# Patient Record
Sex: FEMALE | Race: WHITE | NOT HISPANIC OR LATINO
[De-identification: names, ages, dates, MRNs, and addresses within clinical notes are randomized per-mention and may not be internally consistent; named-entity substitution may affect disease eponyms.]

---

## 2017-03-17 ENCOUNTER — APPOINTMENT (OUTPATIENT)
Dept: INTERNAL MEDICINE | Facility: CLINIC | Age: 82
End: 2017-03-17

## 2017-03-17 VITALS
WEIGHT: 147 LBS | HEART RATE: 74 BPM | HEIGHT: 63 IN | BODY MASS INDEX: 26.05 KG/M2 | RESPIRATION RATE: 14 BRPM | SYSTOLIC BLOOD PRESSURE: 126 MMHG | DIASTOLIC BLOOD PRESSURE: 80 MMHG

## 2017-05-02 ENCOUNTER — RX RENEWAL (OUTPATIENT)
Age: 82
End: 2017-05-02

## 2017-06-06 LAB
BASOPHILS # BLD AUTO: 0.04 K/UL
BASOPHILS NFR BLD AUTO: 0.5 %
EOSINOPHIL # BLD AUTO: 0.1 K/UL
EOSINOPHIL NFR BLD AUTO: 1.4 %
HCT VFR BLD CALC: 44.7 %
HGB BLD-MCNC: 14.4 G/DL
IMM GRANULOCYTES NFR BLD AUTO: 0.3 %
LYMPHOCYTES # BLD AUTO: 3.22 K/UL
LYMPHOCYTES NFR BLD AUTO: 43.8 %
MAN DIFF?: NORMAL
MCHC RBC-ENTMCNC: 26.3 PG
MCHC RBC-ENTMCNC: 32.2 GM/DL
MCV RBC AUTO: 81.7 FL
MONOCYTES # BLD AUTO: 0.42 K/UL
MONOCYTES NFR BLD AUTO: 5.7 %
NEUTROPHILS # BLD AUTO: 3.55 K/UL
NEUTROPHILS NFR BLD AUTO: 48.3 %
PLATELET # BLD AUTO: 332 K/UL
RBC # BLD: 5.47 M/UL
RBC # FLD: 13.3 %
WBC # FLD AUTO: 7.35 K/UL

## 2017-06-07 LAB
25(OH)D3 SERPL-MCNC: 38.4 NG/ML
ALBUMIN SERPL ELPH-MCNC: 4.2 G/DL
ALP BLD-CCNC: 60 U/L
ALT SERPL-CCNC: 10 U/L
AMYLASE/CREAT SERPL: 80 U/L
ANION GAP SERPL CALC-SCNC: 19 MMOL/L
AST SERPL-CCNC: 16 U/L
BILIRUB SERPL-MCNC: 0.6 MG/DL
BUN SERPL-MCNC: 15 MG/DL
CALCIUM SERPL-MCNC: 11.1 MG/DL
CHLORIDE SERPL-SCNC: 102 MMOL/L
CHOLEST SERPL-MCNC: 279 MG/DL
CHOLEST/HDLC SERPL: 3.5 RATIO
CO2 SERPL-SCNC: 22 MMOL/L
CREAT SERPL-MCNC: 0.87 MG/DL
GLUCOSE SERPL-MCNC: 91 MG/DL
HBA1C MFR BLD HPLC: 5.6 %
HDLC SERPL-MCNC: 79 MG/DL
LDLC SERPL CALC-MCNC: 167 MG/DL
LPL SERPL-CCNC: 33 U/L
POTASSIUM SERPL-SCNC: 4.4 MMOL/L
PROT SERPL-MCNC: 7.3 G/DL
SODIUM SERPL-SCNC: 143 MMOL/L
TRIGL SERPL-MCNC: 166 MG/DL
TSH SERPL-ACNC: 0.28 UIU/ML

## 2017-06-16 ENCOUNTER — LABORATORY RESULT (OUTPATIENT)
Age: 82
End: 2017-06-16

## 2017-06-16 ENCOUNTER — APPOINTMENT (OUTPATIENT)
Dept: INTERNAL MEDICINE | Facility: CLINIC | Age: 82
End: 2017-06-16

## 2017-06-16 VITALS
WEIGHT: 147 LBS | HEART RATE: 76 BPM | HEIGHT: 63 IN | DIASTOLIC BLOOD PRESSURE: 74 MMHG | BODY MASS INDEX: 26.05 KG/M2 | RESPIRATION RATE: 15 BRPM | SYSTOLIC BLOOD PRESSURE: 124 MMHG

## 2017-06-17 PROCEDURE — 93010 ELECTROCARDIOGRAM REPORT: CPT

## 2017-06-17 PROCEDURE — 70450 CT HEAD/BRAIN W/O DYE: CPT | Mod: 26

## 2017-06-17 PROCEDURE — 71010: CPT | Mod: 26

## 2017-06-17 PROCEDURE — 74177 CT ABD & PELVIS W/CONTRAST: CPT | Mod: 26

## 2017-06-18 ENCOUNTER — INPATIENT (INPATIENT)
Facility: HOSPITAL | Age: 82
LOS: 1 days | Discharge: HOME | DRG: 312 | End: 2017-06-20
Attending: INTERNAL MEDICINE | Admitting: INTERNAL MEDICINE
Payer: MEDICARE

## 2017-06-18 DIAGNOSIS — Z88.2 ALLERGY STATUS TO SULFONAMIDES: ICD-10-CM

## 2017-06-18 DIAGNOSIS — E89.2 POSTPROCEDURAL HYPOPARATHYROIDISM: ICD-10-CM

## 2017-06-18 DIAGNOSIS — E86.0 DEHYDRATION: ICD-10-CM

## 2017-06-18 DIAGNOSIS — N39.0 URINARY TRACT INFECTION, SITE NOT SPECIFIED: ICD-10-CM

## 2017-06-18 DIAGNOSIS — I70.0 ATHEROSCLEROSIS OF AORTA: ICD-10-CM

## 2017-06-18 DIAGNOSIS — D72.829 ELEVATED WHITE BLOOD CELL COUNT, UNSPECIFIED: ICD-10-CM

## 2017-06-18 DIAGNOSIS — Z85.038 PERSONAL HISTORY OF OTHER MALIGNANT NEOPLASM OF LARGE INTESTINE: ICD-10-CM

## 2017-06-18 DIAGNOSIS — E03.9 HYPOTHYROIDISM, UNSPECIFIED: ICD-10-CM

## 2017-06-18 DIAGNOSIS — K52.9 NONINFECTIVE GASTROENTERITIS AND COLITIS, UNSPECIFIED: ICD-10-CM

## 2017-06-18 DIAGNOSIS — R10.9 UNSPECIFIED ABDOMINAL PAIN: ICD-10-CM

## 2017-06-18 DIAGNOSIS — E83.52 HYPERCALCEMIA: ICD-10-CM

## 2017-06-18 DIAGNOSIS — Z87.891 PERSONAL HISTORY OF NICOTINE DEPENDENCE: ICD-10-CM

## 2017-06-18 DIAGNOSIS — R55 SYNCOPE AND COLLAPSE: ICD-10-CM

## 2017-06-18 DIAGNOSIS — R91.8 OTHER NONSPECIFIC ABNORMAL FINDING OF LUNG FIELD: ICD-10-CM

## 2017-06-18 DIAGNOSIS — F03.90 UNSPECIFIED DEMENTIA WITHOUT BEHAVIORAL DISTURBANCE: ICD-10-CM

## 2017-06-18 PROCEDURE — 99222 1ST HOSP IP/OBS MODERATE 55: CPT

## 2017-06-18 PROCEDURE — 93306 TTE W/DOPPLER COMPLETE: CPT | Mod: 26

## 2017-06-18 PROCEDURE — 99235 HOSP IP/OBS SAME DATE MOD 70: CPT

## 2017-06-18 PROCEDURE — 71275 CT ANGIOGRAPHY CHEST: CPT | Mod: 26

## 2017-06-18 PROCEDURE — 99223 1ST HOSP IP/OBS HIGH 75: CPT | Mod: 25

## 2017-06-19 PROCEDURE — 93970 EXTREMITY STUDY: CPT | Mod: 26

## 2017-06-19 PROCEDURE — 99223 1ST HOSP IP/OBS HIGH 75: CPT

## 2017-06-19 PROCEDURE — 99233 SBSQ HOSP IP/OBS HIGH 50: CPT

## 2017-06-19 PROCEDURE — 99232 SBSQ HOSP IP/OBS MODERATE 35: CPT

## 2017-06-20 PROCEDURE — 83036 HEMOGLOBIN GLYCOSYLATED A1C: CPT

## 2017-06-20 PROCEDURE — 83970 ASSAY OF PARATHORMONE: CPT

## 2017-06-20 PROCEDURE — 85379 FIBRIN DEGRADATION QUANT: CPT

## 2017-06-20 PROCEDURE — 83519 RIA NONANTIBODY: CPT

## 2017-06-20 PROCEDURE — 70450 CT HEAD/BRAIN W/O DYE: CPT

## 2017-06-20 PROCEDURE — 85730 THROMBOPLASTIN TIME PARTIAL: CPT

## 2017-06-20 PROCEDURE — 84439 ASSAY OF FREE THYROXINE: CPT

## 2017-06-20 PROCEDURE — 93306 TTE W/DOPPLER COMPLETE: CPT

## 2017-06-20 PROCEDURE — 85025 COMPLETE CBC W/AUTO DIFF WBC: CPT

## 2017-06-20 PROCEDURE — 82607 VITAMIN B-12: CPT

## 2017-06-20 PROCEDURE — 81003 URINALYSIS AUTO W/O SCOPE: CPT

## 2017-06-20 PROCEDURE — 83690 ASSAY OF LIPASE: CPT

## 2017-06-20 PROCEDURE — 82550 ASSAY OF CK (CPK): CPT

## 2017-06-20 PROCEDURE — 71045 X-RAY EXAM CHEST 1 VIEW: CPT

## 2017-06-20 PROCEDURE — 84484 ASSAY OF TROPONIN QUANT: CPT

## 2017-06-20 PROCEDURE — 93970 EXTREMITY STUDY: CPT

## 2017-06-20 PROCEDURE — 80061 LIPID PANEL: CPT

## 2017-06-20 PROCEDURE — 85027 COMPLETE CBC AUTOMATED: CPT

## 2017-06-20 PROCEDURE — 96361 HYDRATE IV INFUSION ADD-ON: CPT

## 2017-06-20 PROCEDURE — 84100 ASSAY OF PHOSPHORUS: CPT

## 2017-06-20 PROCEDURE — 87086 URINE CULTURE/COLONY COUNT: CPT

## 2017-06-20 PROCEDURE — 82310 ASSAY OF CALCIUM: CPT

## 2017-06-20 PROCEDURE — 82330 ASSAY OF CALCIUM: CPT

## 2017-06-20 PROCEDURE — G0378: CPT

## 2017-06-20 PROCEDURE — 99285 EMERGENCY DEPT VISIT HI MDM: CPT | Mod: 25

## 2017-06-20 PROCEDURE — 84443 ASSAY THYROID STIM HORMONE: CPT

## 2017-06-20 PROCEDURE — 93005 ELECTROCARDIOGRAM TRACING: CPT

## 2017-06-20 PROCEDURE — 82150 ASSAY OF AMYLASE: CPT

## 2017-06-20 PROCEDURE — 82306 VITAMIN D 25 HYDROXY: CPT

## 2017-06-20 PROCEDURE — 74177 CT ABD & PELVIS W/CONTRAST: CPT

## 2017-06-20 PROCEDURE — 85610 PROTHROMBIN TIME: CPT

## 2017-06-20 PROCEDURE — 71275 CT ANGIOGRAPHY CHEST: CPT

## 2017-06-20 PROCEDURE — 82962 GLUCOSE BLOOD TEST: CPT

## 2017-06-20 PROCEDURE — 99239 HOSP IP/OBS DSCHRG MGMT >30: CPT

## 2017-06-20 PROCEDURE — 96360 HYDRATION IV INFUSION INIT: CPT | Mod: XU

## 2017-06-20 PROCEDURE — 80053 COMPREHEN METABOLIC PANEL: CPT

## 2017-06-20 PROCEDURE — 83735 ASSAY OF MAGNESIUM: CPT

## 2017-06-23 RX ORDER — DONEPEZIL HYDROCHLORIDE 5 MG/1
5 TABLET ORAL
Qty: 30 | Refills: 5 | Status: DISCONTINUED | COMMUNITY
Start: 2017-06-16 | End: 2017-06-23

## 2017-07-13 ENCOUNTER — APPOINTMENT (OUTPATIENT)
Dept: NUCLEAR MEDICINE | Facility: CLINIC | Age: 82
End: 2017-07-13

## 2017-07-13 ENCOUNTER — OUTPATIENT (OUTPATIENT)
Dept: OUTPATIENT SERVICES | Facility: HOSPITAL | Age: 82
LOS: 1 days | End: 2017-07-13
Payer: MEDICARE

## 2017-07-13 DIAGNOSIS — Z00.8 ENCOUNTER FOR OTHER GENERAL EXAMINATION: ICD-10-CM

## 2017-07-13 PROCEDURE — A9552: CPT

## 2017-07-13 PROCEDURE — 78815 PET IMAGE W/CT SKULL-THIGH: CPT

## 2017-07-20 ENCOUNTER — APPOINTMENT (OUTPATIENT)
Dept: INTERNAL MEDICINE | Facility: CLINIC | Age: 82
End: 2017-07-20

## 2017-07-20 VITALS
SYSTOLIC BLOOD PRESSURE: 128 MMHG | RESPIRATION RATE: 14 BRPM | HEART RATE: 68 BPM | DIASTOLIC BLOOD PRESSURE: 74 MMHG | HEIGHT: 63 IN

## 2017-08-11 ENCOUNTER — APPOINTMENT (OUTPATIENT)
Dept: HEMATOLOGY ONCOLOGY | Facility: CLINIC | Age: 82
End: 2017-08-11
Payer: MEDICARE

## 2017-08-11 VITALS
DIASTOLIC BLOOD PRESSURE: 70 MMHG | BODY MASS INDEX: 25.76 KG/M2 | HEART RATE: 68 BPM | WEIGHT: 140 LBS | SYSTOLIC BLOOD PRESSURE: 146 MMHG | HEIGHT: 62 IN | TEMPERATURE: 98.3 F

## 2017-08-11 DIAGNOSIS — Z98.890 OTHER SPECIFIED POSTPROCEDURAL STATES: ICD-10-CM

## 2017-08-11 DIAGNOSIS — Z80.1 FAMILY HISTORY OF MALIGNANT NEOPLASM OF TRACHEA, BRONCHUS AND LUNG: ICD-10-CM

## 2017-08-11 DIAGNOSIS — Z87.898 PERSONAL HISTORY OF OTHER SPECIFIED CONDITIONS: ICD-10-CM

## 2017-08-11 DIAGNOSIS — Z87.891 PERSONAL HISTORY OF NICOTINE DEPENDENCE: ICD-10-CM

## 2017-08-11 PROCEDURE — 99205 OFFICE O/P NEW HI 60 MIN: CPT

## 2017-08-16 ENCOUNTER — APPOINTMENT (OUTPATIENT)
Dept: RADIOLOGY | Facility: HOSPITAL | Age: 82
End: 2017-08-16
Payer: MEDICARE

## 2017-08-16 ENCOUNTER — OUTPATIENT (OUTPATIENT)
Dept: OUTPATIENT SERVICES | Facility: HOSPITAL | Age: 82
LOS: 1 days | End: 2017-08-16
Payer: MEDICARE

## 2017-08-16 DIAGNOSIS — M81.0 AGE-RELATED OSTEOPOROSIS WITHOUT CURRENT PATHOLOGICAL FRACTURE: ICD-10-CM

## 2017-08-16 DIAGNOSIS — M85.89 OTHER SPECIFIED DISORDERS OF BONE DENSITY AND STRUCTURE, MULTIPLE SITES: ICD-10-CM

## 2017-08-16 PROCEDURE — 77080 DXA BONE DENSITY AXIAL: CPT

## 2017-08-16 PROCEDURE — 77080 DXA BONE DENSITY AXIAL: CPT | Mod: 26

## 2017-08-22 ENCOUNTER — RX RENEWAL (OUTPATIENT)
Age: 82
End: 2017-08-22

## 2017-10-04 ENCOUNTER — FORM ENCOUNTER (OUTPATIENT)
Age: 82
End: 2017-10-04

## 2017-10-05 ENCOUNTER — APPOINTMENT (OUTPATIENT)
Dept: CT IMAGING | Facility: HOSPITAL | Age: 82
End: 2017-10-05
Payer: MEDICARE

## 2017-10-05 ENCOUNTER — OUTPATIENT (OUTPATIENT)
Dept: OUTPATIENT SERVICES | Facility: HOSPITAL | Age: 82
LOS: 1 days | End: 2017-10-05
Payer: MEDICARE

## 2017-10-05 DIAGNOSIS — R91.1 SOLITARY PULMONARY NODULE: ICD-10-CM

## 2017-10-05 DIAGNOSIS — R91.8 OTHER NONSPECIFIC ABNORMAL FINDING OF LUNG FIELD: ICD-10-CM

## 2017-10-05 LAB
ALBUMIN SERPL ELPH-MCNC: 4.1 G/DL
ALP BLD-CCNC: 69 U/L
ALT SERPL-CCNC: 12 U/L
ANION GAP SERPL CALC-SCNC: 14 MMOL/L
AST SERPL-CCNC: 16 U/L
BASOPHILS # BLD AUTO: 0.03 K/UL
BASOPHILS NFR BLD AUTO: 0.4 %
BILIRUB SERPL-MCNC: 0.2 MG/DL
BUN SERPL-MCNC: 20 MG/DL
CALCIUM SERPL-MCNC: 9.8 MG/DL
CEA SERPL-MCNC: 4.7 NG/ML
CHLORIDE SERPL-SCNC: 102 MMOL/L
CO2 SERPL-SCNC: 25 MMOL/L
CREAT SERPL-MCNC: 0.94 MG/DL
EOSINOPHIL # BLD AUTO: 0.25 K/UL
EOSINOPHIL NFR BLD AUTO: 3.7 %
GLUCOSE SERPL-MCNC: 78 MG/DL
HCT VFR BLD CALC: 38.9 %
HGB BLD-MCNC: 12.6 G/DL
IMM GRANULOCYTES NFR BLD AUTO: 0.3 %
LYMPHOCYTES # BLD AUTO: 2.56 K/UL
LYMPHOCYTES NFR BLD AUTO: 38 %
MAN DIFF?: NORMAL
MCHC RBC-ENTMCNC: 27 PG
MCHC RBC-ENTMCNC: 32.4 GM/DL
MCV RBC AUTO: 83.5 FL
MONOCYTES # BLD AUTO: 0.55 K/UL
MONOCYTES NFR BLD AUTO: 8.2 %
NEUTROPHILS # BLD AUTO: 3.32 K/UL
NEUTROPHILS NFR BLD AUTO: 49.4 %
PLATELET # BLD AUTO: 283 K/UL
POTASSIUM SERPL-SCNC: 4.2 MMOL/L
PROT SERPL-MCNC: 7.1 G/DL
RBC # BLD: 4.66 M/UL
RBC # FLD: 13.7 %
SODIUM SERPL-SCNC: 141 MMOL/L
WBC # FLD AUTO: 6.73 K/UL

## 2017-10-05 PROCEDURE — 71250 CT THORAX DX C-: CPT

## 2017-10-05 PROCEDURE — 71250 CT THORAX DX C-: CPT | Mod: 26

## 2017-10-12 ENCOUNTER — APPOINTMENT (OUTPATIENT)
Dept: HEMATOLOGY ONCOLOGY | Facility: CLINIC | Age: 82
End: 2017-10-12
Payer: MEDICARE

## 2017-10-12 VITALS
DIASTOLIC BLOOD PRESSURE: 60 MMHG | WEIGHT: 143 LBS | SYSTOLIC BLOOD PRESSURE: 122 MMHG | TEMPERATURE: 97.6 F | BODY MASS INDEX: 26.16 KG/M2 | HEART RATE: 84 BPM

## 2017-10-12 PROCEDURE — 99214 OFFICE O/P EST MOD 30 MIN: CPT

## 2017-10-13 ENCOUNTER — APPOINTMENT (OUTPATIENT)
Dept: INTERNAL MEDICINE | Facility: CLINIC | Age: 82
End: 2017-10-13
Payer: MEDICARE

## 2017-10-13 VITALS — HEIGHT: 62 IN

## 2017-10-13 PROCEDURE — 90662 IIV NO PRSV INCREASED AG IM: CPT

## 2017-10-13 PROCEDURE — G0008: CPT

## 2017-10-13 PROCEDURE — 99215 OFFICE O/P EST HI 40 MIN: CPT | Mod: 25

## 2017-10-18 ENCOUNTER — OUTPATIENT (OUTPATIENT)
Dept: OUTPATIENT SERVICES | Facility: HOSPITAL | Age: 82
LOS: 1 days | Discharge: HOME | End: 2017-10-18

## 2017-10-19 ENCOUNTER — OUTPATIENT (OUTPATIENT)
Dept: OUTPATIENT SERVICES | Facility: HOSPITAL | Age: 82
LOS: 1 days | Discharge: HOME | End: 2017-10-19
Payer: MEDICARE

## 2017-10-19 ENCOUNTER — APPOINTMENT (OUTPATIENT)
Dept: RADIATION ONCOLOGY | Facility: CLINIC | Age: 82
End: 2017-10-19
Payer: MEDICARE

## 2017-10-19 VITALS
HEIGHT: 61 IN | SYSTOLIC BLOOD PRESSURE: 171 MMHG | RESPIRATION RATE: 15 BRPM | TEMPERATURE: 98.06 F | DIASTOLIC BLOOD PRESSURE: 89 MMHG | HEART RATE: 74 BPM | WEIGHT: 143.74 LBS | BODY MASS INDEX: 27.14 KG/M2 | OXYGEN SATURATION: 96 %

## 2017-10-19 PROCEDURE — 99204 OFFICE O/P NEW MOD 45 MIN: CPT | Mod: 25

## 2017-10-19 PROCEDURE — 77263 THER RADIOLOGY TX PLNG CPLX: CPT

## 2017-10-26 PROCEDURE — 77334 RADIATION TREATMENT AID(S): CPT | Mod: 26

## 2017-10-26 PROCEDURE — 77333 RADIATION TREATMENT AID(S): CPT | Mod: 26,59

## 2017-10-26 PROCEDURE — 77290 THER RAD SIMULAJ FIELD CPLX: CPT | Mod: 26

## 2017-11-06 PROCEDURE — 77295 3-D RADIOTHERAPY PLAN: CPT | Mod: 26

## 2017-11-06 PROCEDURE — 77300 RADIATION THERAPY DOSE PLAN: CPT | Mod: 26

## 2017-11-06 PROCEDURE — 77334 RADIATION TREATMENT AID(S): CPT | Mod: 26

## 2017-11-19 ENCOUNTER — RX RENEWAL (OUTPATIENT)
Age: 82
End: 2017-11-19

## 2017-11-20 VITALS
WEIGHT: 147.05 LBS | RESPIRATION RATE: 18 BRPM | DIASTOLIC BLOOD PRESSURE: 86 MMHG | SYSTOLIC BLOOD PRESSURE: 168 MMHG | BODY MASS INDEX: 27.76 KG/M2 | HEART RATE: 72 BPM | HEIGHT: 60.98 IN

## 2017-11-20 PROCEDURE — 77435 SBRT MANAGEMENT: CPT

## 2017-12-11 ENCOUNTER — MEDICATION RENEWAL (OUTPATIENT)
Age: 82
End: 2017-12-11

## 2017-12-21 ENCOUNTER — APPOINTMENT (OUTPATIENT)
Dept: INTERNAL MEDICINE | Facility: CLINIC | Age: 82
End: 2017-12-21
Payer: MEDICARE

## 2017-12-21 VITALS
WEIGHT: 144 LBS | DIASTOLIC BLOOD PRESSURE: 74 MMHG | HEART RATE: 74 BPM | HEIGHT: 61 IN | BODY MASS INDEX: 27.19 KG/M2 | SYSTOLIC BLOOD PRESSURE: 134 MMHG | RESPIRATION RATE: 15 BRPM

## 2017-12-21 DIAGNOSIS — E83.52 HYPERCALCEMIA: ICD-10-CM

## 2017-12-21 PROCEDURE — 99215 OFFICE O/P EST HI 40 MIN: CPT

## 2018-02-08 ENCOUNTER — APPOINTMENT (OUTPATIENT)
Dept: HEMATOLOGY ONCOLOGY | Facility: CLINIC | Age: 83
End: 2018-02-08
Payer: MEDICARE

## 2018-02-08 VITALS
DIASTOLIC BLOOD PRESSURE: 56 MMHG | BODY MASS INDEX: 27.78 KG/M2 | SYSTOLIC BLOOD PRESSURE: 106 MMHG | TEMPERATURE: 209.12 F | WEIGHT: 147 LBS | HEART RATE: 60 BPM

## 2018-02-08 PROCEDURE — 99214 OFFICE O/P EST MOD 30 MIN: CPT

## 2018-03-20 ENCOUNTER — APPOINTMENT (OUTPATIENT)
Dept: RADIATION ONCOLOGY | Facility: CLINIC | Age: 83
End: 2018-03-20
Payer: MEDICARE

## 2018-03-20 VITALS
SYSTOLIC BLOOD PRESSURE: 159 MMHG | OXYGEN SATURATION: 98 % | RESPIRATION RATE: 16 BRPM | BODY MASS INDEX: 28.08 KG/M2 | DIASTOLIC BLOOD PRESSURE: 76 MMHG | HEART RATE: 75 BPM | WEIGHT: 148.59 LBS

## 2018-03-20 PROCEDURE — 99024 POSTOP FOLLOW-UP VISIT: CPT

## 2018-03-23 ENCOUNTER — APPOINTMENT (OUTPATIENT)
Dept: CT IMAGING | Facility: HOSPITAL | Age: 83
End: 2018-03-23
Payer: MEDICARE

## 2018-03-23 ENCOUNTER — OUTPATIENT (OUTPATIENT)
Dept: OUTPATIENT SERVICES | Facility: HOSPITAL | Age: 83
LOS: 1 days | End: 2018-03-23
Payer: MEDICARE

## 2018-03-23 DIAGNOSIS — Z00.8 ENCOUNTER FOR OTHER GENERAL EXAMINATION: ICD-10-CM

## 2018-03-23 DIAGNOSIS — C18.9 MALIGNANT NEOPLASM OF COLON, UNSPECIFIED: ICD-10-CM

## 2018-03-23 DIAGNOSIS — J47.9 BRONCHIECTASIS, UNCOMPLICATED: ICD-10-CM

## 2018-03-23 DIAGNOSIS — R91.8 OTHER NONSPECIFIC ABNORMAL FINDING OF LUNG FIELD: ICD-10-CM

## 2018-03-23 DIAGNOSIS — I70.0 ATHEROSCLEROSIS OF AORTA: ICD-10-CM

## 2018-03-23 PROCEDURE — 71260 CT THORAX DX C+: CPT

## 2018-03-23 PROCEDURE — 71260 CT THORAX DX C+: CPT | Mod: 26

## 2018-03-27 ENCOUNTER — APPOINTMENT (OUTPATIENT)
Dept: INTERNAL MEDICINE | Facility: CLINIC | Age: 83
End: 2018-03-27
Payer: MEDICARE

## 2018-03-27 VITALS
BODY MASS INDEX: 28.13 KG/M2 | SYSTOLIC BLOOD PRESSURE: 138 MMHG | RESPIRATION RATE: 15 BRPM | DIASTOLIC BLOOD PRESSURE: 80 MMHG | WEIGHT: 149 LBS | HEART RATE: 74 BPM | HEIGHT: 61 IN

## 2018-03-27 PROCEDURE — 99215 OFFICE O/P EST HI 40 MIN: CPT

## 2018-04-25 ENCOUNTER — OTHER (OUTPATIENT)
Age: 83
End: 2018-04-25

## 2018-05-03 LAB
ALBUMIN SERPL ELPH-MCNC: 4.1 G/DL
ALP BLD-CCNC: 66 U/L
ALT SERPL-CCNC: 11 U/L
ANION GAP SERPL CALC-SCNC: 15 MMOL/L
AST SERPL-CCNC: 18 U/L
BASOPHILS # BLD AUTO: 0.05 K/UL
BASOPHILS NFR BLD AUTO: 0.8 %
BILIRUB SERPL-MCNC: 0.4 MG/DL
BUN SERPL-MCNC: 21 MG/DL
CALCIUM SERPL-MCNC: 9.9 MG/DL
CHLORIDE SERPL-SCNC: 104 MMOL/L
CO2 SERPL-SCNC: 24 MMOL/L
CREAT SERPL-MCNC: 0.93 MG/DL
EOSINOPHIL # BLD AUTO: 0.15 K/UL
EOSINOPHIL NFR BLD AUTO: 2.4 %
GLUCOSE SERPL-MCNC: 105 MG/DL
HCT VFR BLD CALC: 39.8 %
HGB BLD-MCNC: 12.8 G/DL
IMM GRANULOCYTES NFR BLD AUTO: 0.2 %
LYMPHOCYTES # BLD AUTO: 2.35 K/UL
LYMPHOCYTES NFR BLD AUTO: 37.4 %
MAN DIFF?: NORMAL
MCHC RBC-ENTMCNC: 26.8 PG
MCHC RBC-ENTMCNC: 32.2 GM/DL
MCV RBC AUTO: 83.4 FL
MONOCYTES # BLD AUTO: 0.4 K/UL
MONOCYTES NFR BLD AUTO: 6.4 %
NEUTROPHILS # BLD AUTO: 3.33 K/UL
NEUTROPHILS NFR BLD AUTO: 52.8 %
PLATELET # BLD AUTO: 270 K/UL
POTASSIUM SERPL-SCNC: 4.4 MMOL/L
PROT SERPL-MCNC: 6.7 G/DL
RBC # BLD: 4.77 M/UL
RBC # FLD: 13.1 %
SODIUM SERPL-SCNC: 143 MMOL/L
WBC # FLD AUTO: 6.29 K/UL

## 2018-05-14 ENCOUNTER — RX RENEWAL (OUTPATIENT)
Age: 83
End: 2018-05-14

## 2018-05-21 ENCOUNTER — APPOINTMENT (OUTPATIENT)
Dept: HEMATOLOGY ONCOLOGY | Facility: CLINIC | Age: 83
End: 2018-05-21
Payer: MEDICARE

## 2018-05-21 VITALS
BODY MASS INDEX: 28.15 KG/M2 | SYSTOLIC BLOOD PRESSURE: 126 MMHG | HEART RATE: 76 BPM | DIASTOLIC BLOOD PRESSURE: 72 MMHG | WEIGHT: 149 LBS | TEMPERATURE: 209.84 F

## 2018-05-21 VITALS
SYSTOLIC BLOOD PRESSURE: 126 MMHG | TEMPERATURE: 209.84 F | WEIGHT: 149 LBS | DIASTOLIC BLOOD PRESSURE: 74 MMHG | BODY MASS INDEX: 28.15 KG/M2 | HEART RATE: 60 BPM

## 2018-05-21 DIAGNOSIS — Z85.038 PERSONAL HISTORY OF OTHER MALIGNANT NEOPLASM OF LARGE INTESTINE: ICD-10-CM

## 2018-05-21 PROCEDURE — 99214 OFFICE O/P EST MOD 30 MIN: CPT

## 2018-06-21 ENCOUNTER — APPOINTMENT (OUTPATIENT)
Dept: RADIATION ONCOLOGY | Facility: CLINIC | Age: 83
End: 2018-06-21
Payer: MEDICARE

## 2018-06-26 ENCOUNTER — APPOINTMENT (OUTPATIENT)
Dept: INTERNAL MEDICINE | Facility: CLINIC | Age: 83
End: 2018-06-26
Payer: MEDICARE

## 2018-06-26 VITALS
HEART RATE: 78 BPM | BODY MASS INDEX: 28.13 KG/M2 | HEIGHT: 61 IN | RESPIRATION RATE: 15 BRPM | WEIGHT: 149 LBS | SYSTOLIC BLOOD PRESSURE: 128 MMHG | DIASTOLIC BLOOD PRESSURE: 80 MMHG

## 2018-06-26 LAB
ALBUMIN SERPL ELPH-MCNC: 4.2 G/DL
ALP BLD-CCNC: 71 U/L
ALT SERPL-CCNC: 16 U/L
ANION GAP SERPL CALC-SCNC: 14 MMOL/L
AST SERPL-CCNC: 27 U/L
BILIRUB SERPL-MCNC: 0.4 MG/DL
BUN SERPL-MCNC: 17 MG/DL
CALCIUM SERPL-MCNC: 9.9 MG/DL
CHLORIDE SERPL-SCNC: 102 MMOL/L
CO2 SERPL-SCNC: 24 MMOL/L
CREAT SERPL-MCNC: 0.9 MG/DL
GLUCOSE SERPL-MCNC: 89 MG/DL
POTASSIUM SERPL-SCNC: 4.5 MMOL/L
PROT SERPL-MCNC: 7 G/DL
SODIUM SERPL-SCNC: 140 MMOL/L

## 2018-06-26 PROCEDURE — 99215 OFFICE O/P EST HI 40 MIN: CPT

## 2018-06-26 NOTE — REVIEW OF SYSTEMS
[Joint Stiffness] : joint stiffness [Confusion] : confusion [Memory Loss] : memory loss [Unsteady Walking] : ataxia [Fever] : no fever [Chills] : no chills [Fatigue] : no fatigue [Hot Flashes] : no hot flashes [Night Sweats] : no night sweats [Recent Change In Weight] : ~T no recent weight change [Discharge] : no discharge [Pain] : no pain [Redness] : no redness [Dryness] : no dryness  [Vision Problems] : no vision problems [Itching] : no itching [Earache] : no earache [Hearing Loss] : no hearing loss [Nosebleed] : no nosebleeds [Hoarseness] : no hoarseness [Nasal Discharge] : no nasal discharge [Sore Throat] : no sore throat [Postnasal Drip] : no postnasal drip [Chest Pain] : no chest pain [Palpitations] : no palpitations [Leg Claudication] : no leg claudication [Lower Ext Edema] : no lower extremity edema [Orthopnea] : no orthopnea [Paroysmal Nocturnal Dyspnea] : no paroysmal nocturnal dyspnea [Shortness Of Breath] : no shortness of breath [Wheezing] : no wheezing [Cough] : no cough [Dyspnea on Exertion] : no dyspnea on exertion [Abdominal Pain] : no abdominal pain [Nausea] : no nausea [Constipation] : no constipation [Diarrhea] : diarrhea [Vomiting] : no vomiting [Heartburn] : no heartburn [Melena] : no melena [Dysuria] : no dysuria [Incontinence] : no incontinence [Nocturia] : no nocturia [Poor Libido] : libido not poor [Hematuria] : no hematuria [Frequency] : no frequency [Vaginal Discharge] : no vaginal discharge [Dysmenorrhea] : no dysmenorrhea [Joint Pain] : no joint pain [Joint Swelling] : no joint swelling [Muscle Pain] : no muscle pain [Back Pain] : no back pain [Headache] : no headache [Dizziness] : no dizziness [Fainting] : no fainting [Suicidal] : not suicidal [Insomnia] : no insomnia [Anxiety] : no anxiety [Depression] : no depression [Easy Bleeding] : no easy bleeding [Easy Bruising] : no easy bruising [Swollen Glands] : no swollen glands [Negative] : Heme/Lymph

## 2018-06-26 NOTE — ASSESSMENT
[FreeTextEntry1] : Physical exam shows a well-developed female in no acute distress weight was 149 pounds stable from the previous weight of 3 months ago blood pressure 128/80 pulse is 78 respirations 14 HEENT was unremarkable chest was clear vascular exam shows normal S1 and S2 no murmurs abdomen was soft extremities no clubbing cyanosis or edema neurologic exam nonfocal she was oriented only to person patient functioning well at home with help from her family and  they will continue her Aricept and Namenda but they are in agreement that her memory is deteriorating slip was ordered for blood tests including thyroid profile and full chemistries she is up-to-date with her ophthalmologist she will return to the office in 3 months time or earlier if necessary ........

## 2018-06-26 NOTE — PHYSICAL EXAM
[No Acute Distress] : no acute distress [Well Nourished] : well nourished [Well Developed] : well developed [Well-Appearing] : well-appearing [Normal Voice/Communication] : normal voice/communication [Normal Sclera/Conjunctiva] : normal sclera/conjunctiva [PERRL] : pupils equal round and reactive to light [EOMI] : extraocular movements intact [Normal Outer Ear/Nose] : the outer ears and nose were normal in appearance [Normal Oropharynx] : the oropharynx was normal [Normal TMs] : both tympanic membranes were normal [Normal Nasal Mucosa] : the nasal mucosa was normal [No JVD] : no jugular venous distention [Supple] : supple [No Lymphadenopathy] : no lymphadenopathy [Thyroid Normal, No Nodules] : the thyroid was normal and there were no nodules present [No Respiratory Distress] : no respiratory distress  [Clear to Auscultation] : lungs were clear to auscultation bilaterally [No Accessory Muscle Use] : no accessory muscle use [Normal Percussion] : the chest was normal to percussion [Normal Rate] : normal rate  [Regular Rhythm] : with a regular rhythm [Normal S1, S2] : normal S1 and S2 [No Murmur] : no murmur heard [No Carotid Bruits] : no carotid bruits [No Abdominal Bruit] : a ~M bruit was not heard ~T in the abdomen [No Varicosities] : no varicosities [Pedal Pulses Present] : the pedal pulses are present [No Edema] : there was no peripheral edema [No Extremity Clubbing/Cyanosis] : no extremity clubbing/cyanosis [No Palpable Aorta] : no palpable aorta [Declined Breast Exam] : declined breast exam  [Soft] : abdomen soft [Non Tender] : non-tender [Non-distended] : non-distended [No Masses] : no abdominal mass palpated [No HSM] : no HSM [Normal Bowel Sounds] : normal bowel sounds [No Hernias] : no hernias [Declined Rectal Exam] : declined rectal exam [Normal Supraclavicular Nodes] : no supraclavicular lymphadenopathy [Normal Axillary Nodes] : no axillary lymphadenopathy [Normal Posterior Cervical Nodes] : no posterior cervical lymphadenopathy [Normal Femoral Nodes] : no femoral lymphadenopathy [Normal Anterior Cervical Nodes] : no anterior cervical lymphadenopathy [Normal Inguinal Nodes] : no inguinal lymphadenopathy [No CVA Tenderness] : no CVA  tenderness [No Spinal Tenderness] : no spinal tenderness [Kyphosis] : no kyphosis [Scoliosis] : no scoliosis [No Joint Swelling] : no joint swelling [Grossly Normal Strength/Tone] : grossly normal strength/tone [No Rash] : no rash [No Skin Lesions] : no skin lesions [Acne] : no acne [Normal Gait] : normal gait [Coordination Grossly Intact] : coordination grossly intact [No Focal Deficits] : no focal deficits [Deep Tendon Reflexes (DTR)] : deep tendon reflexes were 2+ and symmetric [Speech Grossly Normal] : speech grossly normal [Memory Grossly Normal] : memory grossly normal [Normal Affect] : the affect was normal [Alert and Oriented x3] : oriented to person, place, and time [Normal Mood] : the mood was normal [Normal Insight/Judgement] : insight and judgment were intact

## 2018-06-26 NOTE — HISTORY OF PRESENT ILLNESS
[Spouse] : spouse [Family Member] : family member [FreeTextEntry1] : Comes to the office accompanied by her  and son for followup patient denying any immediate problems of note her memory has worsened [de-identified] : 87-year-old woman with a history of colon cancer recent lung mass radiation therapy dementia ulcerative colitis hypothyroidism and hyperlipidemia comes to the office for followup accompanied by her  and son patient's memory has worsened. She denies temperature chills sweats or myalgia she denies chest pain shortness of breath exertional dyspnea palpitations lightheadedness dizziness vertigo or syncope denies abdominal pain nausea vomiting diarrhea constipation or rectal bleeding is without muscular or joint discomforts her appetite has been good and her weight has been stable her  is her caregiver at home and they are doing well with eating and meals and with caring for the house

## 2018-06-27 LAB — TSH SERPL-ACNC: 0.78 UIU/ML

## 2018-07-03 ENCOUNTER — RX RENEWAL (OUTPATIENT)
Age: 83
End: 2018-07-03

## 2018-08-19 ENCOUNTER — FORM ENCOUNTER (OUTPATIENT)
Age: 83
End: 2018-08-19

## 2018-08-20 ENCOUNTER — OUTPATIENT (OUTPATIENT)
Dept: OUTPATIENT SERVICES | Facility: HOSPITAL | Age: 83
LOS: 1 days | End: 2018-08-20
Payer: MEDICARE

## 2018-08-20 ENCOUNTER — APPOINTMENT (OUTPATIENT)
Dept: CT IMAGING | Facility: HOSPITAL | Age: 83
End: 2018-08-20
Payer: MEDICARE

## 2018-08-20 DIAGNOSIS — R91.8 OTHER NONSPECIFIC ABNORMAL FINDING OF LUNG FIELD: ICD-10-CM

## 2018-08-20 LAB
ALBUMIN SERPL ELPH-MCNC: 4.1 G/DL
ALP BLD-CCNC: 66 U/L
ALT SERPL-CCNC: 12 U/L
ANION GAP SERPL CALC-SCNC: 11 MMOL/L
AST SERPL-CCNC: 19 U/L
BASOPHILS # BLD AUTO: 0.04 K/UL
BASOPHILS NFR BLD AUTO: 0.6 %
BILIRUB SERPL-MCNC: 0.3 MG/DL
BUN SERPL-MCNC: 17 MG/DL
CALCIUM SERPL-MCNC: 9.8 MG/DL
CHLORIDE SERPL-SCNC: 102 MMOL/L
CO2 SERPL-SCNC: 27 MMOL/L
CREAT SERPL-MCNC: 0.78 MG/DL
EOSINOPHIL # BLD AUTO: 0.2 K/UL
EOSINOPHIL NFR BLD AUTO: 3.1 %
GLUCOSE SERPL-MCNC: 76 MG/DL
HCT VFR BLD CALC: 38.6 %
HGB BLD-MCNC: 12.1 G/DL
IMM GRANULOCYTES NFR BLD AUTO: 0.2 %
LYMPHOCYTES # BLD AUTO: 2.25 K/UL
LYMPHOCYTES NFR BLD AUTO: 35.2 %
MAN DIFF?: NORMAL
MCHC RBC-ENTMCNC: 25.4 PG
MCHC RBC-ENTMCNC: 31.3 GM/DL
MCV RBC AUTO: 81.1 FL
MONOCYTES # BLD AUTO: 0.55 K/UL
MONOCYTES NFR BLD AUTO: 8.6 %
NEUTROPHILS # BLD AUTO: 3.34 K/UL
NEUTROPHILS NFR BLD AUTO: 52.3 %
PLATELET # BLD AUTO: 310 K/UL
POTASSIUM SERPL-SCNC: 4 MMOL/L
PROT SERPL-MCNC: 6.9 G/DL
RBC # BLD: 4.76 M/UL
RBC # FLD: 13.8 %
SODIUM SERPL-SCNC: 140 MMOL/L
WBC # FLD AUTO: 6.39 K/UL

## 2018-08-20 PROCEDURE — 71250 CT THORAX DX C-: CPT

## 2018-08-20 PROCEDURE — 71250 CT THORAX DX C-: CPT | Mod: 26

## 2018-08-31 ENCOUNTER — RX RENEWAL (OUTPATIENT)
Age: 83
End: 2018-08-31

## 2018-09-06 ENCOUNTER — APPOINTMENT (OUTPATIENT)
Dept: HEMATOLOGY ONCOLOGY | Facility: CLINIC | Age: 83
End: 2018-09-06
Payer: MEDICARE

## 2018-09-06 VITALS
WEIGHT: 145 LBS | TEMPERATURE: 209.3 F | SYSTOLIC BLOOD PRESSURE: 124 MMHG | HEART RATE: 60 BPM | BODY MASS INDEX: 27.4 KG/M2 | DIASTOLIC BLOOD PRESSURE: 60 MMHG

## 2018-09-06 PROCEDURE — 99214 OFFICE O/P EST MOD 30 MIN: CPT

## 2018-09-25 ENCOUNTER — APPOINTMENT (OUTPATIENT)
Dept: INTERNAL MEDICINE | Facility: CLINIC | Age: 83
End: 2018-09-25
Payer: MEDICARE

## 2018-09-25 VITALS
BODY MASS INDEX: 26.81 KG/M2 | OXYGEN SATURATION: 98 % | HEART RATE: 78 BPM | SYSTOLIC BLOOD PRESSURE: 120 MMHG | DIASTOLIC BLOOD PRESSURE: 78 MMHG | HEIGHT: 61 IN | WEIGHT: 142 LBS

## 2018-09-25 PROCEDURE — 99215 OFFICE O/P EST HI 40 MIN: CPT | Mod: 25

## 2018-09-25 PROCEDURE — 90686 IIV4 VACC NO PRSV 0.5 ML IM: CPT

## 2018-09-25 PROCEDURE — G0008: CPT

## 2018-09-25 NOTE — REVIEW OF SYSTEMS
[Fatigue] : fatigue [Hearing Loss] : hearing loss [Nocturia] : nocturia [Frequency] : frequency [Confusion] : confusion [Memory Loss] : memory loss [Unsteady Walking] : ataxia [Negative] : Heme/Lymph [Fever] : no fever [Chills] : no chills [Hot Flashes] : no hot flashes [Night Sweats] : no night sweats [Recent Change In Weight] : ~T no recent weight change [Discharge] : no discharge [Pain] : no pain [Redness] : no redness [Dryness] : no dryness  [Vision Problems] : no vision problems [Itching] : no itching [Earache] : no earache [Nosebleed] : no nosebleeds [Hoarseness] : no hoarseness [Nasal Discharge] : no nasal discharge [Sore Throat] : no sore throat [Postnasal Drip] : no postnasal drip [Chest Pain] : no chest pain [Palpitations] : no palpitations [Leg Claudication] : no leg claudication [Lower Ext Edema] : no lower extremity edema [Orthopnea] : no orthopnea [Paroysmal Nocturnal Dyspnea] : no paroysmal nocturnal dyspnea [Shortness Of Breath] : no shortness of breath [Wheezing] : no wheezing [Cough] : no cough [Dyspnea on Exertion] : no dyspnea on exertion [Abdominal Pain] : no abdominal pain [Nausea] : no nausea [Constipation] : no constipation [Diarrhea] : diarrhea [Vomiting] : no vomiting [Heartburn] : no heartburn [Melena] : no melena [Dysuria] : no dysuria [Incontinence] : no incontinence [Poor Libido] : libido not poor [Hematuria] : no hematuria [Vaginal Discharge] : no vaginal discharge [Dysmenorrhea] : no dysmenorrhea [Joint Pain] : no joint pain [Joint Stiffness] : no joint stiffness [Joint Swelling] : no joint swelling [Muscle Weakness] : no muscle weakness [Muscle Pain] : no muscle pain [Back Pain] : no back pain [Itching] : no itching [Mole Changes] : no mole changes [Nail Changes] : no nail changes [Hair Changes] : no hair changes [Skin Rash] : no skin rash [Headache] : no headache [Dizziness] : no dizziness [Fainting] : no fainting [Suicidal] : not suicidal [Insomnia] : no insomnia [Anxiety] : no anxiety [Depression] : no depression [Easy Bleeding] : no easy bleeding [Easy Bruising] : no easy bruising [Swollen Glands] : no swollen glands

## 2018-09-25 NOTE — ASSESSMENT
[FreeTextEntry1] : Physical exam shows a well-developed female in no acute distress blood pressure 120/78 weight 142 pounds heart rate is 78 respiratory rate of 14 HEENT was unremarkable chest was clear cardiovascular exam with regular abdomen was soft and nontender extremities showed no clubbing cyanosis or edema neurologic exam was nonfocal there was pain with flexion extension and lateral movement of the neck question cervical arthritis advised Tylenol heat and lidocaine patches . She will consider physical therapy if the previous mentioned did not help her neck her lung mass appears to be stable by recent imaging and she is followed actively by her oncologist she is up-to-date with her ophthalmologist

## 2018-09-25 NOTE — HEALTH RISK ASSESSMENT
[No falls in past year] : Patient reported no falls in the past year [0] : 2) Feeling down, depressed, or hopeless: Not at all (0) [[id number]] : 0

## 2018-09-25 NOTE — HISTORY OF PRESENT ILLNESS
[Spouse] : spouse [Family Member] : family member [FreeTextEntry1] : Comes to the office accompanied by her  and son to review her medications and discuss her overall health main issue dealing with recent pain in her neck [de-identified] : 87-year-old woman with a history of hypothyroidism hyperlipidemia dementia previous colon cancer and present lung mass and was treated with radiation therapy last evaluation by her oncologist with mass was stable she comes for followup patient complains of neck pain with some radiation into her right shoulder the pain is produced by motion she denies temperature chills sweats or myalgias she's had no chest pain shortness of breath exertional dyspnea no hematemesis no hemoptysis he denies lightheadedness palpitations dizziness vertigo or syncope she has had no cough abdominal pain nausea vomiting diarrhea constipation rectal bleeding or black stools her appetite has been fair her weight has been stable

## 2018-10-10 ENCOUNTER — RX RENEWAL (OUTPATIENT)
Age: 83
End: 2018-10-10

## 2018-10-22 ENCOUNTER — APPOINTMENT (OUTPATIENT)
Dept: RADIATION ONCOLOGY | Facility: CLINIC | Age: 83
End: 2018-10-22
Payer: MEDICARE

## 2018-10-22 VITALS
RESPIRATION RATE: 14 BRPM | HEART RATE: 67 BPM | BODY MASS INDEX: 27.24 KG/M2 | DIASTOLIC BLOOD PRESSURE: 84 MMHG | SYSTOLIC BLOOD PRESSURE: 148 MMHG | OXYGEN SATURATION: 98 % | TEMPERATURE: 97.7 F | WEIGHT: 144.18 LBS

## 2018-10-22 PROCEDURE — 99213 OFFICE O/P EST LOW 20 MIN: CPT

## 2018-10-22 NOTE — REASON FOR VISIT
[Routine Follow-Up] : routine follow-up visit for [Lung Cancer] : lung cancer [Clinical -- TNM Staging] : TNM Stage: c [T: ___] : T[unfilled] [AJCC Stage: ____] : AJCC Stage: [unfilled] [FreeTextEntry1] : Lung Neoplasm

## 2018-10-22 NOTE — HISTORY OF PRESENT ILLNESS
[FreeTextEntry1] : Ms. Cook is a 86 year old with a T1, stage I, lung cancer found on imaging.  She underwent 50 Gy of radiation with SBRT to the left lung completed on 11/20/17.  She has a history of colon cancer in 2012 s/p resection.\par \par She followed up with her medical oncologist on 5/21/18 and was doing well. Last CT chest on 3/23/18 demonstrated a large opacity in the left upper lobe measuring 5.5 cm with areas of distortion consistent with post-therapeutic change.  The previously noted [place] mass is no longer clearly delineated.\par \par She presents for routine follow-up.

## 2018-10-22 NOTE — REVIEW OF SYSTEMS
[Negative] : Allergic/Immunologic [de-identified] : right neck pain when turning her head to the right

## 2018-10-22 NOTE — VITALS
[Maximal Pain Intensity: 3/10] : 3/10 [Least Pain Intensity: 0/10] : 0/10 [Pain Description/Quality: ___] : Pain description/quality: [unfilled] [Pain Duration: ___] : Pain duration: [unfilled] [Pain Location: ___] : Pain Location: [unfilled] [50: Requires considerable assistance and frequent medical care.] : 50: Requires considerable assistance and frequent medical care. [ECOG Performance Status: 3 - Capable of only limited self care, confined to bed or chair more than 50% of waking hours] : Performance Status: 3 - Capable of only limited self care, confined to bed or chair more than 50% of waking hours [Pain Interferes with ADLs] : Pain does not interfere with activities of daily living

## 2018-11-12 ENCOUNTER — RX RENEWAL (OUTPATIENT)
Age: 83
End: 2018-11-12

## 2018-12-03 ENCOUNTER — APPOINTMENT (OUTPATIENT)
Dept: RADIOLOGY | Facility: HOSPITAL | Age: 83
End: 2018-12-03

## 2018-12-03 ENCOUNTER — OUTPATIENT (OUTPATIENT)
Dept: OUTPATIENT SERVICES | Facility: HOSPITAL | Age: 83
LOS: 1 days | End: 2018-12-03
Payer: MEDICARE

## 2018-12-03 DIAGNOSIS — Z00.8 ENCOUNTER FOR OTHER GENERAL EXAMINATION: ICD-10-CM

## 2018-12-03 PROCEDURE — 73630 X-RAY EXAM OF FOOT: CPT | Mod: 26,RT

## 2018-12-03 PROCEDURE — 73630 X-RAY EXAM OF FOOT: CPT

## 2018-12-18 ENCOUNTER — APPOINTMENT (OUTPATIENT)
Dept: INTERNAL MEDICINE | Facility: CLINIC | Age: 83
End: 2018-12-18
Payer: MEDICARE

## 2018-12-18 VITALS
SYSTOLIC BLOOD PRESSURE: 134 MMHG | HEART RATE: 72 BPM | WEIGHT: 145 LBS | HEIGHT: 61 IN | DIASTOLIC BLOOD PRESSURE: 76 MMHG | RESPIRATION RATE: 73 BRPM | BODY MASS INDEX: 27.38 KG/M2

## 2018-12-18 PROCEDURE — 99215 OFFICE O/P EST HI 40 MIN: CPT

## 2018-12-18 NOTE — ASSESSMENT
[FreeTextEntry1] : Physical exam shows an elderly woman in no acute distress pressure 134/76 height 5 foot 4 pounds heart rate 72 weight of 145 pounds respiratory rate of 15 HEENT was unremarkable chest was showing rales in her right mid lung fields cardiovascular was regular abdomen soft neuro nonfocal patient would present lung cancer status post CyberKnife radiation to followup with her oncologist in several months patient's memory appears to be stabilized on the medication she is on is still able to bathe and dress was given for complete blood tests she is up-to-date with her ophthalmologist dermatologist as per her lower back therapy was recommended and will contact me if interested

## 2018-12-18 NOTE — REVIEW OF SYSTEMS
[Negative] : Heme/Lymph [Cough] : cough [Joint Stiffness] : joint stiffness [Back Pain] : back pain [Unsteady Walking] : ataxia [Fever] : no fever [Chills] : no chills [Fatigue] : no fatigue [Hot Flashes] : no hot flashes [Night Sweats] : no night sweats [Recent Change In Weight] : ~T no recent weight change [Discharge] : no discharge [Pain] : no pain [Redness] : no redness [Dryness] : no dryness  [Vision Problems] : no vision problems [Itching] : no itching [Earache] : no earache [Hearing Loss] : no hearing loss [Nosebleed] : no nosebleeds [Hoarseness] : no hoarseness [Nasal Discharge] : no nasal discharge [Sore Throat] : no sore throat [Postnasal Drip] : no postnasal drip [Chest Pain] : no chest pain [Palpitations] : no palpitations [Leg Claudication] : no leg claudication [Lower Ext Edema] : no lower extremity edema [Orthopnea] : no orthopnea [Paroysmal Nocturnal Dyspnea] : no paroysmal nocturnal dyspnea [Shortness Of Breath] : no shortness of breath [Wheezing] : no wheezing [Dyspnea on Exertion] : no dyspnea on exertion [Abdominal Pain] : no abdominal pain [Nausea] : no nausea [Constipation] : no constipation [Diarrhea] : diarrhea [Vomiting] : no vomiting [Heartburn] : no heartburn [Melena] : no melena [Dysuria] : no dysuria [Incontinence] : no incontinence [Nocturia] : no nocturia [Poor Libido] : libido not poor [Hematuria] : no hematuria [Frequency] : no frequency [Vaginal Discharge] : no vaginal discharge [Dysmenorrhea] : no dysmenorrhea [Joint Pain] : no joint pain [Joint Swelling] : no joint swelling [Muscle Weakness] : no muscle weakness [Muscle Pain] : no muscle pain [Itching] : no itching [Mole Changes] : no mole changes [Nail Changes] : no nail changes [Hair Changes] : no hair changes [Skin Rash] : no skin rash [Headache] : no headache [Dizziness] : no dizziness [Fainting] : no fainting [Confusion] : no confusion [Memory Loss] : no memory loss [Suicidal] : not suicidal [Insomnia] : no insomnia [Anxiety] : no anxiety [Depression] : no depression [Easy Bleeding] : no easy bleeding [Easy Bruising] : no easy bruising [Swollen Glands] : no swollen glands

## 2018-12-18 NOTE — PHYSICAL EXAM
[No Acute Distress] : no acute distress [Well Nourished] : well nourished [Well Developed] : well developed [Well-Appearing] : well-appearing [Normal Voice/Communication] : normal voice/communication [Normal Sclera/Conjunctiva] : normal sclera/conjunctiva [PERRL] : pupils equal round and reactive to light [EOMI] : extraocular movements intact [Normal Outer Ear/Nose] : the outer ears and nose were normal in appearance [Normal Oropharynx] : the oropharynx was normal [Normal TMs] : both tympanic membranes were normal [Normal Nasal Mucosa] : the nasal mucosa was normal [No JVD] : no jugular venous distention [Supple] : supple [No Lymphadenopathy] : no lymphadenopathy [Thyroid Normal, No Nodules] : the thyroid was normal and there were no nodules present [No Respiratory Distress] : no respiratory distress  [Clear to Auscultation] : lungs were clear to auscultation bilaterally [No Accessory Muscle Use] : no accessory muscle use [Normal Rate] : normal rate  [Regular Rhythm] : with a regular rhythm [Normal S1, S2] : normal S1 and S2 [No Murmur] : no murmur heard [No Carotid Bruits] : no carotid bruits [No Abdominal Bruit] : a ~M bruit was not heard ~T in the abdomen [No Varicosities] : no varicosities [Pedal Pulses Present] : the pedal pulses are present [No Edema] : there was no peripheral edema [No Extremity Clubbing/Cyanosis] : no extremity clubbing/cyanosis [No Palpable Aorta] : no palpable aorta [Declined Breast Exam] : declined breast exam  [Soft] : abdomen soft [Non Tender] : non-tender [Non-distended] : non-distended [No Masses] : no abdominal mass palpated [No HSM] : no HSM [Normal Bowel Sounds] : normal bowel sounds [No Hernias] : no hernias [Declined Rectal Exam] : declined rectal exam [Normal Supraclavicular Nodes] : no supraclavicular lymphadenopathy [Normal Axillary Nodes] : no axillary lymphadenopathy [Normal Posterior Cervical Nodes] : no posterior cervical lymphadenopathy [Normal Femoral Nodes] : no femoral lymphadenopathy [Normal Anterior Cervical Nodes] : no anterior cervical lymphadenopathy [Normal Inguinal Nodes] : no inguinal lymphadenopathy [No CVA Tenderness] : no CVA  tenderness [No Spinal Tenderness] : no spinal tenderness [Kyphosis] : no kyphosis [Scoliosis] : no scoliosis [No Joint Swelling] : no joint swelling [Grossly Normal Strength/Tone] : grossly normal strength/tone [No Rash] : no rash [No Skin Lesions] : no skin lesions [Acne] : no acne [Normal Gait] : normal gait [Coordination Grossly Intact] : coordination grossly intact [No Focal Deficits] : no focal deficits [Deep Tendon Reflexes (DTR)] : deep tendon reflexes were 2+ and symmetric [Speech Grossly Normal] : speech grossly normal [Normal Affect] : the affect was normal [Normal Mood] : the mood was normal [Normal Insight/Judgement] : insight and judgment were intact

## 2018-12-18 NOTE — HEALTH RISK ASSESSMENT
[No falls in past year] : Patient reported no falls in the past year [0] : 2) Feeling down, depressed, or hopeless: Not at all (0) [[id number]] : 0

## 2018-12-18 NOTE — HISTORY OF PRESENT ILLNESS
[FreeTextEntry1] : Comes to the office for followup to review her medications discussed overall health chief complaint severe with her memory and her lower back [de-identified] : 87-year-old woman with a history of remote colon cancer and a recent lung mass which underwent CyberKnife radiation therapy dementia ulcerative colitis hyperlipidemia and hypothyroidism and severe office for followup accompanied by her  and son patient denies temperature chills sweats myalgias headache sinus congestion sore throat he has a mild occasional cough he denies chest pain shortness of breath exertional dyspnea lightheadedness palpitations dizziness vertigo or syncope she has had no abdominal pain nausea vomiting diarrhea bright red blood per rectum or black stools she eats well and her weight has been stable denies significant musculoskeletal pains of joints are stiff and occasionally she has lower back issues

## 2019-01-01 ENCOUNTER — MED ADMIN CHARGE (OUTPATIENT)
Age: 84
End: 2019-01-01

## 2019-01-01 ENCOUNTER — APPOINTMENT (OUTPATIENT)
Dept: INTERNAL MEDICINE | Facility: CLINIC | Age: 84
End: 2019-01-01
Payer: MEDICARE

## 2019-01-01 VITALS — DIASTOLIC BLOOD PRESSURE: 78 MMHG | SYSTOLIC BLOOD PRESSURE: 136 MMHG

## 2019-01-01 VITALS — WEIGHT: 132 LBS | HEIGHT: 61 IN | BODY MASS INDEX: 24.92 KG/M2

## 2019-01-01 VITALS — HEART RATE: 80 BPM | RESPIRATION RATE: 15 BRPM

## 2019-01-01 DIAGNOSIS — Z23 ENCOUNTER FOR IMMUNIZATION: ICD-10-CM

## 2019-01-01 PROCEDURE — G0444 DEPRESSION SCREEN ANNUAL: CPT | Mod: 59

## 2019-01-01 PROCEDURE — 90662 IIV NO PRSV INCREASED AG IM: CPT

## 2019-01-01 PROCEDURE — 99215 OFFICE O/P EST HI 40 MIN: CPT | Mod: 25

## 2019-01-01 PROCEDURE — G0008: CPT

## 2019-02-28 ENCOUNTER — FORM ENCOUNTER (OUTPATIENT)
Age: 84
End: 2019-02-28

## 2019-03-01 ENCOUNTER — OUTPATIENT (OUTPATIENT)
Dept: OUTPATIENT SERVICES | Facility: HOSPITAL | Age: 84
LOS: 1 days | End: 2019-03-01
Payer: MEDICARE

## 2019-03-01 ENCOUNTER — APPOINTMENT (OUTPATIENT)
Dept: CT IMAGING | Facility: HOSPITAL | Age: 84
End: 2019-03-01
Payer: MEDICARE

## 2019-03-01 DIAGNOSIS — R91.8 OTHER NONSPECIFIC ABNORMAL FINDING OF LUNG FIELD: ICD-10-CM

## 2019-03-01 LAB
ALBUMIN SERPL ELPH-MCNC: 4.2 G/DL
ALP BLD-CCNC: 74 U/L
ALT SERPL-CCNC: 14 U/L
ANION GAP SERPL CALC-SCNC: 12 MMOL/L
AST SERPL-CCNC: 19 U/L
BASOPHILS # BLD AUTO: 0.06 K/UL
BASOPHILS NFR BLD AUTO: 0.9 %
BILIRUB SERPL-MCNC: 0.4 MG/DL
BUN SERPL-MCNC: 16 MG/DL
CALCIUM SERPL-MCNC: 9.9 MG/DL
CHLORIDE SERPL-SCNC: 102 MMOL/L
CO2 SERPL-SCNC: 29 MMOL/L
CREAT SERPL-MCNC: 0.85 MG/DL
EOSINOPHIL # BLD AUTO: 0.16 K/UL
EOSINOPHIL NFR BLD AUTO: 2.5 %
GLUCOSE SERPL-MCNC: 77 MG/DL
HCT VFR BLD CALC: 40.5 %
HGB BLD-MCNC: 12.7 G/DL
IMM GRANULOCYTES NFR BLD AUTO: 0.3 %
LYMPHOCYTES # BLD AUTO: 2.42 K/UL
LYMPHOCYTES NFR BLD AUTO: 37.2 %
MAN DIFF?: NORMAL
MCHC RBC-ENTMCNC: 26.2 PG
MCHC RBC-ENTMCNC: 31.4 GM/DL
MCV RBC AUTO: 83.5 FL
MONOCYTES # BLD AUTO: 0.51 K/UL
MONOCYTES NFR BLD AUTO: 7.8 %
NEUTROPHILS # BLD AUTO: 3.34 K/UL
NEUTROPHILS NFR BLD AUTO: 51.3 %
PLATELET # BLD AUTO: 300 K/UL
POTASSIUM SERPL-SCNC: 4.2 MMOL/L
PROT SERPL-MCNC: 6.8 G/DL
RBC # BLD: 4.85 M/UL
RBC # FLD: 13.1 %
SODIUM SERPL-SCNC: 143 MMOL/L
WBC # FLD AUTO: 6.51 K/UL

## 2019-03-01 PROCEDURE — 71250 CT THORAX DX C-: CPT | Mod: 26

## 2019-03-01 PROCEDURE — 71250 CT THORAX DX C-: CPT

## 2019-03-04 ENCOUNTER — MESSAGE (OUTPATIENT)
Age: 84
End: 2019-03-04

## 2019-03-12 ENCOUNTER — APPOINTMENT (OUTPATIENT)
Dept: INTERNAL MEDICINE | Facility: CLINIC | Age: 84
End: 2019-03-12
Payer: MEDICARE

## 2019-03-12 VITALS
DIASTOLIC BLOOD PRESSURE: 78 MMHG | SYSTOLIC BLOOD PRESSURE: 128 MMHG | HEART RATE: 74 BPM | BODY MASS INDEX: 27.38 KG/M2 | HEIGHT: 61 IN | WEIGHT: 145 LBS | RESPIRATION RATE: 15 BRPM

## 2019-03-12 DIAGNOSIS — Z00.00 ENCOUNTER FOR GENERAL ADULT MEDICAL EXAMINATION W/OUT ABNORMAL FINDINGS: ICD-10-CM

## 2019-03-12 PROCEDURE — 99215 OFFICE O/P EST HI 40 MIN: CPT

## 2019-03-12 NOTE — REVIEW OF SYSTEMS
[Joint Stiffness] : joint stiffness [Back Pain] : back pain [Memory Loss] : memory loss [Unsteady Walking] : ataxia [Negative] : Heme/Lymph [Fever] : no fever [Chills] : no chills [Fatigue] : no fatigue [Hot Flashes] : no hot flashes [Night Sweats] : no night sweats [Recent Change In Weight] : ~T no recent weight change [Discharge] : no discharge [Pain] : no pain [Redness] : no redness [Dryness] : no dryness  [Vision Problems] : no vision problems [Itching] : no itching [Earache] : no earache [Hearing Loss] : no hearing loss [Nosebleed] : no nosebleeds [Hoarseness] : no hoarseness [Nasal Discharge] : no nasal discharge [Sore Throat] : no sore throat [Postnasal Drip] : no postnasal drip [Chest Pain] : no chest pain [Palpitations] : no palpitations [Leg Claudication] : no leg claudication [Lower Ext Edema] : no lower extremity edema [Orthopnea] : no orthopnea [Paroysmal Nocturnal Dyspnea] : no paroysmal nocturnal dyspnea [Shortness Of Breath] : no shortness of breath [Wheezing] : no wheezing [Cough] : no cough [Dyspnea on Exertion] : no dyspnea on exertion [Abdominal Pain] : no abdominal pain [Nausea] : no nausea [Constipation] : no constipation [Diarrhea] : diarrhea [Vomiting] : no vomiting [Heartburn] : no heartburn [Melena] : no melena [Dysuria] : no dysuria [Incontinence] : no incontinence [Nocturia] : no nocturia [Poor Libido] : libido not poor [Hematuria] : no hematuria [Frequency] : no frequency [Vaginal Discharge] : no vaginal discharge [Dysmenorrhea] : no dysmenorrhea [Joint Pain] : no joint pain [Joint Swelling] : no joint swelling [Muscle Weakness] : no muscle weakness [Muscle Pain] : no muscle pain [Mole Changes] : no mole changes [Nail Changes] : no nail changes [Hair Changes] : no hair changes [Skin Rash] : no skin rash [Headache] : no headache [Dizziness] : no dizziness [Fainting] : no fainting [Confusion] : no confusion [Suicidal] : not suicidal [Insomnia] : no insomnia [Anxiety] : no anxiety [Depression] : no depression [Easy Bleeding] : no easy bleeding [Easy Bruising] : no easy bruising [Swollen Glands] : no swollen glands

## 2019-03-12 NOTE — ASSESSMENT
[FreeTextEntry1] : Physical exam shows an elderly woman in no acute distress blood pressure 128/78 height 5 foot 1 inch weight 145 pounds heart rate is 74 respiratory rate of 15 HEENT was unremarkable chest was clear cardiovascular exam showed normal S1 and S2 with no extra heart sounds or murmurs abdomen was soft and nontender extremities showed no clubbing cyanosis or edema neurologic exam was nonfocal she was alert and oriented to person place but not time a slip was given to her for complete blood tests she is up-to-date with her followup to hematology in view of her history of lung cancer with a repeat six-month CT scan was unchanged from the previous one she is up-to-date with her ophthalmology and dermatology appointment she will return to the office in 3 months time

## 2019-03-12 NOTE — HEALTH RISK ASSESSMENT
[No falls in past year] : Patient reported no falls in the past year [0] : 2) Feeling down, depressed, or hopeless: Not at all (0) [[id number]] : 0

## 2019-03-12 NOTE — HISTORY OF PRESENT ILLNESS
[Spouse] : spouse [Family Member] : family member [FreeTextEntry1] : Comes to the office for followup to review her medications and discuss her overall health she is accompanied by her  and son [de-identified] : 87-year-old woman with a history of lung mass status post treatment with CyberKnife hyperlipidemia hypothyroidism colon cancer dementia osteopenia and ulcerative colitis patient recently underwent a repeat PET/CT scan which showed stability of the lesion patient denies temperature chills sweats or myalgias she said no headache sinus congestion sore throat cough wheezing pleurisy chest pain shortness of breath exertional dyspnea lightheadedness palpitations dizziness vertigo or syncope she has had no abdominal pain urinary frequency or dysuria he denies nausea vomiting diarrhea constipation bright red blood per rectum or black stools her appetite has been good her weight has been stable she denies any significant musculoskeletal complaints

## 2019-04-01 ENCOUNTER — EMERGENCY (EMERGENCY)
Facility: HOSPITAL | Age: 84
LOS: 1 days | Discharge: HOME | End: 2019-04-01
Attending: EMERGENCY MEDICINE | Admitting: EMERGENCY MEDICINE
Payer: MEDICARE

## 2019-04-01 VITALS
RESPIRATION RATE: 20 BRPM | HEIGHT: 62 IN | WEIGHT: 125 LBS | TEMPERATURE: 98 F | SYSTOLIC BLOOD PRESSURE: 175 MMHG | OXYGEN SATURATION: 97 % | DIASTOLIC BLOOD PRESSURE: 77 MMHG | HEART RATE: 77 BPM

## 2019-04-01 VITALS
RESPIRATION RATE: 16 BRPM | DIASTOLIC BLOOD PRESSURE: 69 MMHG | OXYGEN SATURATION: 98 % | SYSTOLIC BLOOD PRESSURE: 158 MMHG | HEART RATE: 74 BPM

## 2019-04-01 VITALS
DIASTOLIC BLOOD PRESSURE: 78 MMHG | SYSTOLIC BLOOD PRESSURE: 150 MMHG | RESPIRATION RATE: 18 BRPM | WEIGHT: 145.06 LBS | TEMPERATURE: 97 F | OXYGEN SATURATION: 98 % | HEART RATE: 72 BPM | HEIGHT: 62 IN

## 2019-04-01 LAB
ALBUMIN SERPL ELPH-MCNC: 3.4 G/DL — SIGNIFICANT CHANGE UP (ref 3.3–5)
ALP SERPL-CCNC: 72 U/L — SIGNIFICANT CHANGE UP (ref 40–120)
ALT FLD-CCNC: 16 U/L DA — SIGNIFICANT CHANGE UP (ref 10–45)
ANION GAP SERPL CALC-SCNC: 7 MMOL/L — SIGNIFICANT CHANGE UP (ref 5–17)
APTT BLD: 32.8 SEC — SIGNIFICANT CHANGE UP (ref 27.5–36.3)
AST SERPL-CCNC: 15 U/L — SIGNIFICANT CHANGE UP (ref 10–40)
BASOPHILS # BLD AUTO: 0.1 K/UL — SIGNIFICANT CHANGE UP (ref 0–0.2)
BASOPHILS NFR BLD AUTO: 1.9 % — SIGNIFICANT CHANGE UP (ref 0–2)
BILIRUB SERPL-MCNC: 0.3 MG/DL — SIGNIFICANT CHANGE UP (ref 0.2–1.2)
BUN SERPL-MCNC: 26 MG/DL — HIGH (ref 7–23)
CALCIUM SERPL-MCNC: 9.3 MG/DL — SIGNIFICANT CHANGE UP (ref 8.4–10.5)
CHLORIDE SERPL-SCNC: 105 MMOL/L — SIGNIFICANT CHANGE UP (ref 96–108)
CO2 SERPL-SCNC: 27 MMOL/L — SIGNIFICANT CHANGE UP (ref 22–31)
CREAT SERPL-MCNC: 0.9 MG/DL — SIGNIFICANT CHANGE UP (ref 0.5–1.3)
D DIMER BLD IA.RAPID-MCNC: 504 NG/ML DDU — HIGH
EOSINOPHIL # BLD AUTO: 0.2 K/UL — SIGNIFICANT CHANGE UP (ref 0–0.5)
EOSINOPHIL NFR BLD AUTO: 3.5 % — SIGNIFICANT CHANGE UP (ref 0–6)
GLUCOSE SERPL-MCNC: 101 MG/DL — HIGH (ref 70–99)
HCT VFR BLD CALC: 41.2 % — SIGNIFICANT CHANGE UP (ref 34.5–45)
HGB BLD-MCNC: 13 G/DL — SIGNIFICANT CHANGE UP (ref 11.5–15.5)
INR BLD: 1.05 RATIO — SIGNIFICANT CHANGE UP (ref 0.88–1.16)
LYMPHOCYTES # BLD AUTO: 2.2 K/UL — SIGNIFICANT CHANGE UP (ref 1–3.3)
LYMPHOCYTES # BLD AUTO: 34.2 % — SIGNIFICANT CHANGE UP (ref 13–44)
MCHC RBC-ENTMCNC: 26.7 PG — LOW (ref 27–34)
MCHC RBC-ENTMCNC: 31.5 GM/DL — LOW (ref 32–36)
MCV RBC AUTO: 84.7 FL — SIGNIFICANT CHANGE UP (ref 80–100)
MONOCYTES # BLD AUTO: 0.5 K/UL — SIGNIFICANT CHANGE UP (ref 0–0.9)
MONOCYTES NFR BLD AUTO: 8.4 % — SIGNIFICANT CHANGE UP (ref 2–14)
NEUTROPHILS # BLD AUTO: 3.4 K/UL — SIGNIFICANT CHANGE UP (ref 1.8–7.4)
NEUTROPHILS NFR BLD AUTO: 52 % — SIGNIFICANT CHANGE UP (ref 43–77)
NT-PROBNP SERPL-SCNC: 342 PG/ML — HIGH (ref 0–300)
PLATELET # BLD AUTO: 256 K/UL — SIGNIFICANT CHANGE UP (ref 150–400)
POTASSIUM SERPL-MCNC: 4.2 MMOL/L — SIGNIFICANT CHANGE UP (ref 3.5–5.3)
POTASSIUM SERPL-SCNC: 4.2 MMOL/L — SIGNIFICANT CHANGE UP (ref 3.5–5.3)
PROT SERPL-MCNC: 7.2 G/DL — SIGNIFICANT CHANGE UP (ref 6–8.3)
PROTHROM AB SERPL-ACNC: 11.8 SEC — SIGNIFICANT CHANGE UP (ref 10–12.9)
RBC # BLD: 4.87 M/UL — SIGNIFICANT CHANGE UP (ref 3.8–5.2)
RBC # FLD: 12.3 % — SIGNIFICANT CHANGE UP (ref 10.3–14.5)
SODIUM SERPL-SCNC: 139 MMOL/L — SIGNIFICANT CHANGE UP (ref 135–145)
TROPONIN I SERPL-MCNC: <.017 NG/ML — LOW (ref 0.02–0.06)
TSH SERPL-MCNC: 0.43 UIU/ML — SIGNIFICANT CHANGE UP (ref 0.27–4.2)
WBC # BLD: 6.5 K/UL — SIGNIFICANT CHANGE UP (ref 3.8–10.5)
WBC # FLD AUTO: 6.5 K/UL — SIGNIFICANT CHANGE UP (ref 3.8–10.5)

## 2019-04-01 PROCEDURE — 99284 EMERGENCY DEPT VISIT MOD MDM: CPT | Mod: 25

## 2019-04-01 PROCEDURE — 71045 X-RAY EXAM CHEST 1 VIEW: CPT

## 2019-04-01 PROCEDURE — 93005 ELECTROCARDIOGRAM TRACING: CPT

## 2019-04-01 PROCEDURE — 85610 PROTHROMBIN TIME: CPT

## 2019-04-01 PROCEDURE — 99284 EMERGENCY DEPT VISIT MOD MDM: CPT

## 2019-04-01 PROCEDURE — 93010 ELECTROCARDIOGRAM REPORT: CPT

## 2019-04-01 PROCEDURE — 36415 COLL VENOUS BLD VENIPUNCTURE: CPT

## 2019-04-01 PROCEDURE — 80053 COMPREHEN METABOLIC PANEL: CPT

## 2019-04-01 PROCEDURE — 84484 ASSAY OF TROPONIN QUANT: CPT

## 2019-04-01 PROCEDURE — 85379 FIBRIN DEGRADATION QUANT: CPT

## 2019-04-01 PROCEDURE — 71275 CT ANGIOGRAPHY CHEST: CPT

## 2019-04-01 PROCEDURE — 83880 ASSAY OF NATRIURETIC PEPTIDE: CPT

## 2019-04-01 PROCEDURE — 85027 COMPLETE CBC AUTOMATED: CPT

## 2019-04-01 PROCEDURE — 71045 X-RAY EXAM CHEST 1 VIEW: CPT | Mod: 26

## 2019-04-01 PROCEDURE — 84443 ASSAY THYROID STIM HORMONE: CPT

## 2019-04-01 PROCEDURE — 85730 THROMBOPLASTIN TIME PARTIAL: CPT

## 2019-04-01 PROCEDURE — 99285 EMERGENCY DEPT VISIT HI MDM: CPT

## 2019-04-01 PROCEDURE — 71275 CT ANGIOGRAPHY CHEST: CPT | Mod: 26

## 2019-04-01 RX ORDER — SODIUM CHLORIDE 9 MG/ML
500 INJECTION INTRAMUSCULAR; INTRAVENOUS; SUBCUTANEOUS ONCE
Qty: 0 | Refills: 0 | Status: COMPLETED | OUTPATIENT
Start: 2019-04-01 | End: 2019-04-01

## 2019-04-01 RX ADMIN — SODIUM CHLORIDE 500 MILLILITER(S): 9 INJECTION INTRAMUSCULAR; INTRAVENOUS; SUBCUTANEOUS at 15:00

## 2019-04-01 RX ADMIN — SODIUM CHLORIDE 500 MILLILITER(S): 9 INJECTION INTRAMUSCULAR; INTRAVENOUS; SUBCUTANEOUS at 15:28

## 2019-04-01 NOTE — ED PROVIDER NOTE - OBJECTIVE STATEMENT
pt 86 yo f who was seen in ED earlier today for palpitations intermittently, CHAVEZ, SOB x 6 months. last had palpitations last night at midnight while she was sleeping. can last up to 1 hour. was called back for elevated d-dimer  no new complaints

## 2019-04-01 NOTE — ED PROVIDER NOTE - ATTENDING CONTRIBUTION TO CARE
Dr. Hernandez: I performed a face to face bedside interview with patient regarding history of present illness, review of symptoms and past medical history. I completed an independent physical exam.  I have discussed patient's plan of care with PA.   I agree with note as stated above, having amended the EMR as needed to reflect my findings.   This includes HISTORY OF PRESENT ILLNESS, HIV, PAST MEDICAL/SURGICAL/FAMILY/SOCIAL HISTORY, ALLERGIES AND HOME MEDICATIONS, REVIEW OF SYSTEMS, PHYSICAL EXAM, and any PROGRESS NOTES during the time I functioned as the attending physician for this patient.    87F h/o hypothyroidism, no change in meds, lung ca, p/w 6 mos of intermittent palpitations no chest pain, no pleuritic pain, and intermittent CHAVEZ. Sx not worse today. No calf pain or swelling. No recent travel. No fevers or chills. No cough.    PMD Harlan.    On exam pt is well appearing, nad, rrr, ctab, abdo soft/nt/nd, no pedal edema or calf ttp Dr. Hernandez: I performed a face to face bedside interview with patient regarding history of present illness, review of symptoms and past medical history. I completed an independent physical exam.  I have discussed patient's plan of care with PA.   I agree with note as stated above, having amended the EMR as needed to reflect my findings.   This includes HISTORY OF PRESENT ILLNESS, HIV, PAST MEDICAL/SURGICAL/FAMILY/SOCIAL HISTORY, ALLERGIES AND HOME MEDICATIONS, REVIEW OF SYSTEMS, PHYSICAL EXAM, and any PROGRESS NOTES during the time I functioned as the attending physician for this patient.    87F h/o hypothyroidism, no change in meds, lung ca, p/w 6 mos of intermittent palpitations no chest pain, no pleuritic pain, and intermittent CHAVEZ. Sx not worse today. No calf pain or swelling. No recent travel. No fevers or chills. No cough.    PMD Harlan.    On exam pt is well appearing, nad, rrr, ctab, abdo soft/nt/nd, no pedal edema or calf ttp    Plan - ekg, labs including Roberto, TSH, CXR, reassess

## 2019-04-01 NOTE — ED PROVIDER NOTE - CARE PROVIDER_API CALL
Brain Escamilla)  Cardiology; Internal Medicine  [address], [address]  [place] [address]  Phone: [phone]  Fax: [phone]  Follow Up Time:

## 2019-04-01 NOTE — ED ADULT TRIAGE NOTE - CHIEF COMPLAINT QUOTE
Pt c/o palpitations and SOB starting last night at midnight. Pt denies chest pain. Pt has hx of hypothyroidism.

## 2019-04-01 NOTE — ED PROVIDER NOTE - OBJECTIVE STATEMENT
pt 86 yo f hx per pt,  and previous sunrise notes c/o palpitations intermittently, CHAVEZ, SOB x 6 months. last had palpitations last night at midnight while she was sleeping. can last up to 1 hour  denies fever, chills, cough, CP, n/v/d, abd pain, swelling, weakness

## 2019-04-01 NOTE — ED PROVIDER NOTE - ATTENDING CONTRIBUTION TO CARE
pt was called back to ED for elevated ddimer and need for CTA r/o PE. pt was seen for palpitations last night but has had them intermittently for 6 mns with sob, CHAVEZ. no chest pain    exam: well appearing, NAD. HEENT: eyes perrl, nose normal, OP no erythema/exudate/swelling. cor: RRR, s1s2, 2+rad pulses. lungs: ctabl, no resp distress. abd: soft, ntnd. neuro: a&ox3, cn2-12 intact, JADE, 5/5 strength c nl sensation all extremities, nl coordination. extrem: no swelling. Skin: normal, no rash    AP: palp, sob c elevated ddimer. stable. check cta r/o PE.

## 2019-04-01 NOTE — ED ADULT NURSE NOTE - NSIMPLEMENTINTERV_GEN_ALL_ED
Implemented All Fall with Harm Risk Interventions:  [place] to call system. Call bell, personal items and telephone within reach. Instruct patient to call for assistance. Room bathroom lighting operational. Non-slip footwear when patient is off stretcher. Physically safe environment: no spills, clutter or unnecessary equipment. Stretcher in lowest position, wheels locked, appropriate side rails in place. Provide visual cue, wrist band, yellow gown, etc. Monitor gait and stability. Monitor for mental status changes and reorient to person, place, and time. Review medications for side effects contributing to fall risk. Reinforce activity limits and safety measures with patient and family. Provide visual clues: red socks.

## 2019-04-01 NOTE — ED PROVIDER NOTE - NSFOLLOWUPINSTRUCTIONS_ED_ALL_ED_FT
Follow up with your primary care provider and the cardiologist you were given this AM  Any worsening of symptoms or new concerning symptoms return to the ED

## 2019-04-01 NOTE — ED ADULT NURSE NOTE - OBJECTIVE STATEMENT
Patient complaining of palpitations with mild shortness of breath. Patient A and o x 3, calm and cooperative. She denies nausea and states she has been feeling these palpitations for approx. one week. Patient denies chest pain, denies headache.

## 2019-04-01 NOTE — ED ADULT TRIAGE NOTE - CHIEF COMPLAINT QUOTE
pt presents as call back for elevated d-dimer, for ct scan. recently discharged from ED earlier today. denies any additional symptoms.

## 2019-04-01 NOTE — ED PROVIDER NOTE - CHPI ED SYMPTOMS NEG
no syncope/no vomiting/no chills/no cough/no back pain/no chest pain/no dizziness/no fever/no diaphoresis/no nausea

## 2019-04-01 NOTE — ED ADULT NURSE NOTE - OBJECTIVE STATEMENT
received pt to room 11 for evaluation s/p call back for elevated d-dimer today. denies any symptoms at present. pt presents awake a&ox4, appropriately answering questions, but forgetful at times. denies dizziness or ha, skin warm, dry, appropriate for race. respirations even, unlabored. denies cp or sob. abdomen soft nontender nondistended. denies n/v/d. denies fevers or chills. ivl placed. pt to ct scan.  at bedside, safety maintained. pending disposition.

## 2019-04-01 NOTE — ED PROVIDER NOTE - NSFOLLOWUPINSTRUCTIONS_ED_ALL_ED_FT
1. Follow up with Dr. Claros and Dr. Escamilla in 1-2 days  2. Return to the ED for worsening palpitations, chest pain, shortness of breath or any concerns 1. Follow up with Dr. Claros and Dr. Escamilla in 1-2 days  2. Return to the ED for worsening palpitations, chest pain, shortness of breath or any concerns    Heart Palpitations    WHAT YOU NEED TO KNOW:    Heart palpitations are feelings that your heart races, jumps, throbs, or flutters. You may feel extra beats, no beats for a short time, or skipped beats. You may have these feelings in your chest, throat, or neck. They may happen when you are sitting, standing, or lying. Heart palpitations may be frightening, but are usually not caused by a serious problem.     DISCHARGE INSTRUCTIONS:    Call 911 or have someone else call for any of the following:     You have any of the following signs of a heart attack:   Squeezing, pressure, or pain in your chest      You may also have any of the following:   Discomfort or pain in your back, neck, jaw, stomach, or arm      Shortness of breath      Nausea or vomiting      Lightheadedness or a sudden cold sweat      You have any of the following signs of a stroke:   Numbness or drooping on one side of your face       Weakness in an arm or leg      Confusion or difficulty speaking      Dizziness, a severe headache, or vision loss      You faint or lose consciousness.     Return to the emergency department if:     Your palpitations happen more often or get more intense.         Contact your healthcare provider if:     You have new or worsening swelling in your feet or ankles.      You have questions or concerns about your condition or care.    Follow up with your healthcare provider as directed: You may need to follow up with a cardiologist. You may need tests to check for heart problems that cause palpitations. Write down your questions so you remember to ask them during your visits.     Keep a record: Write down when your palpitations start and stop, what you were doing when they started, and your symptoms. Keep track of what you ate or drank within a few hours of your palpitations. Include anything that seemed to help your symptoms, such as lying down or holding your breath. This record will help you and your healthcare provider learn what triggers your palpitations. Bring this record with you to your follow up visits.    Help prevent heart palpitations:     Manage stress and anxiety. Find ways to relax such as listening to music, meditating, or doing yoga. Exercise can also help decrease stress and anxiety. Talk to someone you trust about your stress or anxiety. You can also talk to a therapist.       Get plenty of sleep every night. Ask your healthcare provider how much sleep you need each night.       Do not drink caffeine or alcohol. Caffeine and alcohol can make your palpitations worse. Caffeine is found in soda, coffee, tea, chocolate, and drinks that increase your energy.       Do not smoke. Nicotine and other chemicals in cigarettes and cigars may damage your heart and blood vessels. Ask your healthcare provider for information if you currently smoke and need help to quit. E-cigarettes or smokeless tobacco still contain nicotine. Talk to your healthcare provider before you use these products.       Do not use illegal drugs. Talk to your healthcare provider if you use illegal drugs and want help to quit.          © Copyright [employer] 2019 All illustrations and images included in CareNotes are the copyrighted property of [employer] or [employer].

## 2019-04-02 ENCOUNTER — APPOINTMENT (OUTPATIENT)
Dept: HEMATOLOGY ONCOLOGY | Facility: CLINIC | Age: 84
End: 2019-04-02
Payer: MEDICARE

## 2019-04-02 ENCOUNTER — OUTPATIENT (OUTPATIENT)
Dept: OUTPATIENT SERVICES | Facility: HOSPITAL | Age: 84
LOS: 1 days | Discharge: HOME | End: 2019-04-02

## 2019-04-02 VITALS
OXYGEN SATURATION: 99 % | DIASTOLIC BLOOD PRESSURE: 70 MMHG | BODY MASS INDEX: 27.28 KG/M2 | WEIGHT: 144.4 LBS | TEMPERATURE: 97.5 F | SYSTOLIC BLOOD PRESSURE: 173 MMHG | HEART RATE: 74 BPM | RESPIRATION RATE: 16 BRPM

## 2019-04-02 DIAGNOSIS — C34.90 MALIGNANT NEOPLASM OF UNSPECIFIED PART OF UNSPECIFIED BRONCHUS OR LUNG: ICD-10-CM

## 2019-04-02 PROCEDURE — 99214 OFFICE O/P EST MOD 30 MIN: CPT

## 2019-04-02 NOTE — RESULTS/DATA
[FreeTextEntry1] : 3/2019-CT scan of the chest-stable consolidation containing dilated airways in the left upper lobe when compared to previous exam.

## 2019-04-02 NOTE — REASON FOR VISIT
[Follow-Up Visit] : a follow-up [Spouse] : spouse [Other: _____] : [unfilled] [FreeTextEntry2] : lung cancer

## 2019-04-02 NOTE — HISTORY OF PRESENT ILLNESS
[de-identified] : 6/2017-Patient had been having nausea with associated weight loss x approx. 6 weeks. Fell with transient LOC/seizure activity. She was admitted to the hospital and was found to be hypercalcemic and dehydrated. Additional evaluation included a CT Angiogram of the chest which compared to 5/2015 showed an enlarging nodule in the left upper lobe (17 mm), suspicious for bronchogenic carcinoma. No acute pulmonary emboli demonstrated. Followup PET/CT scan showed hypermetabolic mixed solid and ground glass left upper lobe pulmonary nodule (2 cm), increased since 5/2015 compatible with a primary lung neoplasm. 2 small hypermetabolic right intraparotid soft tissue nodules. Probable small FDG avid left cervical lymph node, nonspecific, moderate sized hiatal hernia, containing indeterminate, focal hypermetabolism, diffuse thyroid hypermetabolism. Patient underwent SRS for the primary lung lesion (no biopsy).\par Patient has dementia.\par Son Emmanuel (a dentist). [de-identified] : +History of colon cancer-approx. 2012, s/p resection (Dr. Grayson).\par +History of hypercalcemia-s/p partial parathyroidectomy. [de-identified] : +AM palpitations a couple of times in the last week.  Went to  ED yesterday-told unremarkable evaluation-CTA chest negative for PE. Plans to see cardiologist. \par No complaints of chest pain, shortness of breath, hemoptysis, or fever. No cough. No change in bowel habits.\par Patient lives with  at home-managing so far. Son assists as needed.\par Son and  present today.

## 2019-04-02 NOTE — REVIEW OF SYSTEMS
[Negative] : Allergic/Immunologic [Chest Pain] : no chest pain [Confused] : no confusion [Fainting] : no fainting

## 2019-04-02 NOTE — CONSULT LETTER
[Dear  ___] : Dear  [unfilled], [Courtesy Letter:] : I had the pleasure of seeing your patient, [unfilled], in my office today. [Please see my note below.] : Please see my note below. [Consult Closing:] : Thank you very much for allowing me to participate in the care of this patient.  If you have any questions, please do not hesitate to contact me. [Sincerely,] : Sincerely, [FreeTextEntry3] : Shelley Rubin M.D.

## 2019-04-02 NOTE — ASSESSMENT
[FreeTextEntry1] : Lung neoplasm-reviewed lab work and CT scan of the chest report with patient/family. Clinically stable in this regard. Continue expectant surveillance.\par Family was given the opportunity to ask questions. Their questions have been answered to their satisfaction at this time.

## 2019-04-16 ENCOUNTER — APPOINTMENT (OUTPATIENT)
Dept: CARDIOLOGY | Facility: CLINIC | Age: 84
End: 2019-04-16
Payer: MEDICARE

## 2019-04-16 ENCOUNTER — NON-APPOINTMENT (OUTPATIENT)
Age: 84
End: 2019-04-16

## 2019-04-16 VITALS
DIASTOLIC BLOOD PRESSURE: 83 MMHG | RESPIRATION RATE: 17 BRPM | HEIGHT: 61 IN | WEIGHT: 140 LBS | BODY MASS INDEX: 26.43 KG/M2 | SYSTOLIC BLOOD PRESSURE: 139 MMHG | OXYGEN SATURATION: 97 % | HEART RATE: 76 BPM

## 2019-04-16 VITALS — HEART RATE: 84 BPM | SYSTOLIC BLOOD PRESSURE: 124 MMHG | DIASTOLIC BLOOD PRESSURE: 72 MMHG

## 2019-04-16 PROBLEM — E03.9 HYPOTHYROIDISM, UNSPECIFIED: Chronic | Status: ACTIVE | Noted: 2019-04-01

## 2019-04-16 PROCEDURE — 99214 OFFICE O/P EST MOD 30 MIN: CPT

## 2019-04-16 PROCEDURE — 93000 ELECTROCARDIOGRAM COMPLETE: CPT

## 2019-04-16 PROCEDURE — 93306 TTE W/DOPPLER COMPLETE: CPT

## 2019-04-16 PROCEDURE — 93228 REMOTE 30 DAY ECG REV/REPORT: CPT

## 2019-04-16 NOTE — REASON FOR VISIT
[Initial Evaluation] : an initial evaluation of [Palpitations] : palpitations [FreeTextEntry1] : This is an 87-year-old woman who presents for cardiac evaluation. Apparently for the last several weeks the patient has been waking up in the morning with palpitations. She has dementia so it is somewhat difficult to tell but according to his son is probably occurring twice a week lasting approximate half hour time period the patient has no known history of heart disease in the past she does have occasional shortness of breath with exertion. She has no chest discomfort dizziness or syncope. She has no history of MI or CHF. She did have lung cancer that was treated to him years ago with CyberKnife. She has a 40 year history of smoking but stopped approximately 27 years ago. She is a nonalcohol user she rarely drinks soda and occasionally drinks coffee. She does have a history her son believes her chronic bundle branch block. Her most recent lipid profile which is from some time ago revealed total cholesterol 279 HDL 79 LDL 67. [Family Member] : family member

## 2019-04-16 NOTE — ASSESSMENT
[FreeTextEntry1] : The patient was echocardiography laboratory. Echocardiogram revealed mild to moderate calcific aortic stenosis and normal left ventricular systolic function.\par \par In summary, the patient is an elderly woman with palpitations without symptoms of hemodynamic compromise in the setting of normal left ventricular systolic function.\par \par We'll place loop recorder on her to assess etiology of her palpitations

## 2019-04-16 NOTE — PHYSICAL EXAM
[General Appearance - Well Developed] : well developed [Normal Appearance] : normal appearance [Well Groomed] : well groomed [General Appearance - Well Nourished] : well nourished [No Deformities] : no deformities [Normal Oral Mucosa] : normal oral mucosa [General Appearance - In No Acute Distress] : no acute distress [No Oral Pallor] : no oral pallor [No Oral Cyanosis] : no oral cyanosis [Normal Jugular Venous A Waves Present] : normal jugular venous A waves present [Normal Jugular Venous V Waves Present] : normal jugular venous V waves present [No Jugular Venous Ahmadi A Waves] : no jugular venous ahmadi A waves [5th Left ICS - MCL] : palpated at the 5th LICS in the midclavicular line [Normal Rate] : normal [Rhythm Regular] : regular [II] : a grade 2 [Respiration, Rhythm And Depth] : normal respiratory rhythm and effort [Exaggerated Use Of Accessory Muscles For Inspiration] : no accessory muscle use [Auscultation Breath Sounds / Voice Sounds] : lungs were clear to auscultation bilaterally [Abdomen Soft] : soft [Abdomen Tenderness] : non-tender [Abdomen Mass (___ Cm)] : no abdominal mass palpated [Abnormal Walk] : normal gait [Gait - Sufficient For Exercise Testing] : the gait was sufficient for exercise testing [Nail Clubbing] : no clubbing of the fingernails [Cyanosis, Localized] : no localized cyanosis [Petechial Hemorrhages (___cm)] : no petechial hemorrhages [Skin Color & Pigmentation] : normal skin color and pigmentation [] : no rash [No Venous Stasis] : no venous stasis [Skin Lesions] : no skin lesions [No Skin Ulcers] : no skin ulcer [No Xanthoma] : no  xanthoma was observed [Oriented To Time, Place, And Person] : oriented to person, place, and time [Affect] : the affect was normal [Mood] : the mood was normal [No Anxiety] : not feeling anxious

## 2019-04-19 ENCOUNTER — RX RENEWAL (OUTPATIENT)
Age: 84
End: 2019-04-19

## 2019-05-31 LAB
ALBUMIN SERPL ELPH-MCNC: 4.2 G/DL
ALP BLD-CCNC: 62 U/L
ALT SERPL-CCNC: 14 U/L
ANION GAP SERPL CALC-SCNC: 12 MMOL/L
APPEARANCE: CLEAR
AST SERPL-CCNC: 13 U/L
BASOPHILS # BLD AUTO: 0.06 K/UL
BASOPHILS NFR BLD AUTO: 1 %
BILIRUB SERPL-MCNC: 0.4 MG/DL
BILIRUBIN URINE: NEGATIVE
BLOOD URINE: NEGATIVE
BUN SERPL-MCNC: 16 MG/DL
CALCIUM SERPL-MCNC: 9.9 MG/DL
CHLORIDE SERPL-SCNC: 104 MMOL/L
CHOLEST SERPL-MCNC: 273 MG/DL
CHOLEST/HDLC SERPL: 4 RATIO
CO2 SERPL-SCNC: 28 MMOL/L
COLOR: YELLOW
CREAT SERPL-MCNC: 0.8 MG/DL
CRP SERPL HS-MCNC: 1.19 MG/L
EOSINOPHIL # BLD AUTO: 0.18 K/UL
EOSINOPHIL NFR BLD AUTO: 2.9 %
GLUCOSE BS SERPL-MCNC: 84 MG/DL
GLUCOSE QUALITATIVE U: NEGATIVE
GLUCOSE SERPL-MCNC: 94 MG/DL
HBA1C MFR BLD HPLC: 5.6 %
HCT VFR BLD CALC: 42.3 %
HDLC SERPL-MCNC: 69 MG/DL
HGB BLD-MCNC: 13.3 G/DL
IMM GRANULOCYTES NFR BLD AUTO: 0.3 %
KETONES URINE: NEGATIVE
LDLC SERPL CALC-MCNC: 167 MG/DL
LEUKOCYTE ESTERASE URINE: NEGATIVE
LYMPHOCYTES # BLD AUTO: 2.43 K/UL
LYMPHOCYTES NFR BLD AUTO: 39.7 %
MAN DIFF?: NORMAL
MCHC RBC-ENTMCNC: 26.4 PG
MCHC RBC-ENTMCNC: 31.4 GM/DL
MCV RBC AUTO: 84.1 FL
MONOCYTES # BLD AUTO: 0.41 K/UL
MONOCYTES NFR BLD AUTO: 6.7 %
NEUTROPHILS # BLD AUTO: 3.02 K/UL
NEUTROPHILS NFR BLD AUTO: 49.4 %
NITRITE URINE: NEGATIVE
PH URINE: 6.5
PLATELET # BLD AUTO: 315 K/UL
POTASSIUM SERPL-SCNC: 4.1 MMOL/L
PROT SERPL-MCNC: 6.8 G/DL
PROTEIN URINE: NEGATIVE
RBC # BLD: 5.03 M/UL
RBC # FLD: 13 %
SODIUM SERPL-SCNC: 144 MMOL/L
SPECIFIC GRAVITY URINE: 1.01
T4 FREE SERPL-MCNC: 1.5 NG/DL
TRIGL SERPL-MCNC: 185 MG/DL
TSH SERPL-ACNC: 0.5 UIU/ML
UROBILINOGEN URINE: NORMAL
VIT B12 SERPL-MCNC: 615 PG/ML
WBC # FLD AUTO: 6.12 K/UL

## 2019-06-03 LAB — 25(OH)D3 SERPL-MCNC: 37.5 NG/ML

## 2019-06-12 ENCOUNTER — APPOINTMENT (OUTPATIENT)
Dept: INTERNAL MEDICINE | Facility: CLINIC | Age: 84
End: 2019-06-12
Payer: MEDICARE

## 2019-06-12 VITALS
WEIGHT: 133 LBS | SYSTOLIC BLOOD PRESSURE: 128 MMHG | DIASTOLIC BLOOD PRESSURE: 72 MMHG | HEART RATE: 82 BPM | HEIGHT: 61 IN | RESPIRATION RATE: 17 BRPM | BODY MASS INDEX: 25.11 KG/M2 | TEMPERATURE: 98.1 F | OXYGEN SATURATION: 98 %

## 2019-06-12 DIAGNOSIS — R00.2 PALPITATIONS: ICD-10-CM

## 2019-06-12 PROCEDURE — 99215 OFFICE O/P EST HI 40 MIN: CPT

## 2019-06-12 NOTE — REVIEW OF SYSTEMS
[Joint Stiffness] : joint stiffness [Back Pain] : back pain [Confusion] : confusion [Memory Loss] : memory loss [Unsteady Walking] : ataxia [Negative] : Heme/Lymph [Fever] : no fever [Chills] : no chills [Fatigue] : no fatigue [Night Sweats] : no night sweats [Recent Change In Weight] : ~T no recent weight change [Hot Flashes] : no hot flashes [Discharge] : no discharge [Pain] : no pain [Redness] : no redness [Vision Problems] : no vision problems [Dryness] : no dryness  [Itching] : no itching [Earache] : no earache [Nosebleed] : no nosebleeds [Hearing Loss] : no hearing loss [Hoarseness] : no hoarseness [Sore Throat] : no sore throat [Nasal Discharge] : no nasal discharge [Postnasal Drip] : no postnasal drip [Chest Pain] : no chest pain [Palpitations] : no palpitations [Leg Claudication] : no leg claudication [Lower Ext Edema] : no lower extremity edema [Paroysmal Nocturnal Dyspnea] : no paroysmal nocturnal dyspnea [Orthopnea] : no orthopnea [Cough] : no cough [Shortness Of Breath] : no shortness of breath [Wheezing] : no wheezing [Abdominal Pain] : no abdominal pain [Dyspnea on Exertion] : no dyspnea on exertion [Nausea] : no nausea [Diarrhea] : diarrhea [Constipation] : no constipation [Vomiting] : no vomiting [Heartburn] : no heartburn [Melena] : no melena [Dysuria] : no dysuria [Incontinence] : no incontinence [Nocturia] : no nocturia [Frequency] : no frequency [Poor Libido] : libido not poor [Hematuria] : no hematuria [Vaginal Discharge] : no vaginal discharge [Joint Pain] : no joint pain [Dysmenorrhea] : no dysmenorrhea [Muscle Pain] : no muscle pain [Muscle Weakness] : no muscle weakness [Joint Swelling] : no joint swelling [Mole Changes] : no mole changes [Nail Changes] : no nail changes [Hair Changes] : no hair changes [Skin Rash] : no skin rash [Dizziness] : no dizziness [Headache] : no headache [Suicidal] : not suicidal [Fainting] : no fainting [Insomnia] : no insomnia [Anxiety] : no anxiety [Depression] : no depression [Easy Bleeding] : no easy bleeding [Swollen Glands] : no swollen glands [Easy Bruising] : no easy bruising

## 2019-06-12 NOTE — PHYSICAL EXAM
[No Acute Distress] : no acute distress [Well Nourished] : well nourished [Well-Appearing] : well-appearing [Well Developed] : well developed [Normal Sclera/Conjunctiva] : normal sclera/conjunctiva [PERRL] : pupils equal round and reactive to light [Normal Voice/Communication] : normal voice/communication [EOMI] : extraocular movements intact [Normal Outer Ear/Nose] : the outer ears and nose were normal in appearance [Normal TMs] : both tympanic membranes were normal [Normal Oropharynx] : the oropharynx was normal [No JVD] : no jugular venous distention [Normal Nasal Mucosa] : the nasal mucosa was normal [Supple] : supple [No Lymphadenopathy] : no lymphadenopathy [No Respiratory Distress] : no respiratory distress  [Thyroid Normal, No Nodules] : the thyroid was normal and there were no nodules present [No Accessory Muscle Use] : no accessory muscle use [Normal Rate] : normal rate  [Clear to Auscultation] : lungs were clear to auscultation bilaterally [Normal S1, S2] : normal S1 and S2 [Regular Rhythm] : with a regular rhythm [No Abdominal Bruit] : a ~M bruit was not heard ~T in the abdomen [No Murmur] : no murmur heard [No Carotid Bruits] : no carotid bruits [No Varicosities] : no varicosities [No Edema] : there was no peripheral edema [Pedal Pulses Present] : the pedal pulses are present [No Extremity Clubbing/Cyanosis] : no extremity clubbing/cyanosis [No Palpable Aorta] : no palpable aorta [Declined Breast Exam] : declined breast exam  [Soft] : abdomen soft [Non Tender] : non-tender [No HSM] : no HSM [No Masses] : no abdominal mass palpated [Non-distended] : non-distended [Normal Bowel Sounds] : normal bowel sounds [No Hernias] : no hernias [Declined Rectal Exam] : declined rectal exam [Normal Posterior Cervical Nodes] : no posterior cervical lymphadenopathy [Normal Supraclavicular Nodes] : no supraclavicular lymphadenopathy [Normal Axillary Nodes] : no axillary lymphadenopathy [Normal Anterior Cervical Nodes] : no anterior cervical lymphadenopathy [Normal Femoral Nodes] : no femoral lymphadenopathy [No Spinal Tenderness] : no spinal tenderness [Normal Inguinal Nodes] : no inguinal lymphadenopathy [No CVA Tenderness] : no CVA  tenderness [Grossly Normal Strength/Tone] : grossly normal strength/tone [No Rash] : no rash [No Joint Swelling] : no joint swelling [Normal Gait] : normal gait [No Skin Lesions] : no skin lesions [Coordination Grossly Intact] : coordination grossly intact [No Focal Deficits] : no focal deficits [Deep Tendon Reflexes (DTR)] : deep tendon reflexes were 2+ and symmetric [Normal Affect] : the affect was normal [Speech Grossly Normal] : speech grossly normal [Normal Mood] : the mood was normal [Normal Insight/Judgement] : insight and judgment were intact [Alert and Oriented x3] : oriented to person, place, and time [Acne] : no acne [Scoliosis] : no scoliosis [Kyphosis] : no kyphosis

## 2019-06-12 NOTE — HEALTH RISK ASSESSMENT
[No falls in past year] : Patient reported no falls in the past year [0] : 2) Feeling down, depressed, or hopeless: Not at all (0) [[id number]] : 0

## 2019-06-12 NOTE — HISTORY OF PRESENT ILLNESS
[Family Member] : family member [Spouse] : spouse [de-identified] : 87-year-old woman with advanced dementia lung mass being followed by Dr. Sanders remote colon cancer ulcerative colitis left bundle branch block heart palpitations hypothyroidism and hyperlipidemia comes to the office or followup accompanied by her accompanied by her son and  the son reports that her memory has been deteriorating she denies temperature chills sweats or myalgias she has had no headache sinus congestion cough sore throat wheezing pleurisy chest pain shortness of breath exertional dyspnea lightheadedness dizziness vertigo or syncope he denies abdominal pain nausea vomiting diarrhea constipation bright red blood per rectum or black stools her appetite is good her weight is stable she denies urinary complaints no claudication [FreeTextEntry1] : Comes to the office for followup to review her medications and discuss her overall health main medical concerns have been her memory and lung mass

## 2019-06-12 NOTE — ASSESSMENT
[FreeTextEntry1] : Physical exam shows a well-developed female in no acute distress blood pressure 120/72 height 5 feet 1 inch weight 133 pounds heart rate of 82 respirations of 15 HEENT was unremarkable chest was clear cardiovascular was regular there was a 1/6 systolic murmur abdomen was soft and nontender extremities showed no clubbing cyanosis or edema neurologic exam was nonfocal... patient being followed with serial CAT scans with slow progression of her lung mass she will follow with Dr. Sanders concerning this issue she is up-to-date on her ophthalmologist, we reviewed with her  and son her blood test results was significant only for an elevated LDL in view of her dementia and lung mass restrictions of diet will not be entertained I believe that medications that she is on for her dementia will slow the progression and I reinforced that with the son

## 2019-08-17 ENCOUNTER — EMERGENCY (EMERGENCY)
Facility: HOSPITAL | Age: 84
LOS: 1 days | Discharge: HOME | End: 2019-08-17
Attending: INTERNAL MEDICINE | Admitting: INTERNAL MEDICINE
Payer: MEDICARE

## 2019-08-17 VITALS
OXYGEN SATURATION: 97 % | HEIGHT: 63 IN | RESPIRATION RATE: 18 BRPM | SYSTOLIC BLOOD PRESSURE: 203 MMHG | DIASTOLIC BLOOD PRESSURE: 81 MMHG | WEIGHT: 139.99 LBS | TEMPERATURE: 98 F | HEART RATE: 75 BPM

## 2019-08-17 VITALS
SYSTOLIC BLOOD PRESSURE: 111 MMHG | OXYGEN SATURATION: 96 % | RESPIRATION RATE: 15 BRPM | HEART RATE: 75 BPM | DIASTOLIC BLOOD PRESSURE: 53 MMHG

## 2019-08-17 LAB
ALBUMIN SERPL ELPH-MCNC: 3.6 G/DL — SIGNIFICANT CHANGE UP (ref 3.3–5)
ALP SERPL-CCNC: 77 U/L — SIGNIFICANT CHANGE UP (ref 40–120)
ALT FLD-CCNC: 11 U/L DA — SIGNIFICANT CHANGE UP (ref 10–45)
ANION GAP SERPL CALC-SCNC: 7 MMOL/L — SIGNIFICANT CHANGE UP (ref 5–17)
APPEARANCE UR: CLEAR — SIGNIFICANT CHANGE UP
APTT BLD: 28 SEC — SIGNIFICANT CHANGE UP (ref 27.5–36.3)
AST SERPL-CCNC: 24 U/L — SIGNIFICANT CHANGE UP (ref 10–40)
BACTERIA # UR AUTO: NEGATIVE /HPF — SIGNIFICANT CHANGE UP
BASOPHILS # BLD AUTO: 0.06 K/UL — SIGNIFICANT CHANGE UP (ref 0–0.2)
BASOPHILS NFR BLD AUTO: 0.5 % — SIGNIFICANT CHANGE UP (ref 0–2)
BILIRUB SERPL-MCNC: 0.4 MG/DL — SIGNIFICANT CHANGE UP (ref 0.2–1.2)
BILIRUB UR-MCNC: NEGATIVE — SIGNIFICANT CHANGE UP
BUN SERPL-MCNC: 20 MG/DL — SIGNIFICANT CHANGE UP (ref 7–23)
CALCIUM SERPL-MCNC: 9.6 MG/DL — SIGNIFICANT CHANGE UP (ref 8.4–10.5)
CHLORIDE SERPL-SCNC: 104 MMOL/L — SIGNIFICANT CHANGE UP (ref 96–108)
CO2 SERPL-SCNC: 28 MMOL/L — SIGNIFICANT CHANGE UP (ref 22–31)
COLOR SPEC: YELLOW — SIGNIFICANT CHANGE UP
CREAT SERPL-MCNC: 0.84 MG/DL — SIGNIFICANT CHANGE UP (ref 0.5–1.3)
DIFF PNL FLD: ABNORMAL
EOSINOPHIL # BLD AUTO: 0.18 K/UL — SIGNIFICANT CHANGE UP (ref 0–0.5)
EOSINOPHIL NFR BLD AUTO: 1.4 % — SIGNIFICANT CHANGE UP (ref 0–6)
EPI CELLS # UR: SIGNIFICANT CHANGE UP
GLUCOSE SERPL-MCNC: 86 MG/DL — SIGNIFICANT CHANGE UP (ref 70–99)
GLUCOSE UR QL: NEGATIVE — SIGNIFICANT CHANGE UP
HCT VFR BLD CALC: 40.6 % — SIGNIFICANT CHANGE UP (ref 34.5–45)
HGB BLD-MCNC: 13.2 G/DL — SIGNIFICANT CHANGE UP (ref 11.5–15.5)
IMM GRANULOCYTES NFR BLD AUTO: 0.4 % — SIGNIFICANT CHANGE UP (ref 0–1.5)
INR BLD: 0.95 RATIO — SIGNIFICANT CHANGE UP (ref 0.88–1.16)
KETONES UR-MCNC: NEGATIVE — SIGNIFICANT CHANGE UP
LEUKOCYTE ESTERASE UR-ACNC: ABNORMAL
LYMPHOCYTES # BLD AUTO: 16.6 % — SIGNIFICANT CHANGE UP (ref 13–44)
LYMPHOCYTES # BLD AUTO: 2.21 K/UL — SIGNIFICANT CHANGE UP (ref 1–3.3)
MCHC RBC-ENTMCNC: 26.8 PG — LOW (ref 27–34)
MCHC RBC-ENTMCNC: 32.5 GM/DL — SIGNIFICANT CHANGE UP (ref 32–36)
MCV RBC AUTO: 82.5 FL — SIGNIFICANT CHANGE UP (ref 80–100)
MONOCYTES # BLD AUTO: 0.83 K/UL — SIGNIFICANT CHANGE UP (ref 0–0.9)
MONOCYTES NFR BLD AUTO: 6.2 % — SIGNIFICANT CHANGE UP (ref 2–14)
NEUTROPHILS # BLD AUTO: 9.98 K/UL — HIGH (ref 1.8–7.4)
NEUTROPHILS NFR BLD AUTO: 74.9 % — SIGNIFICANT CHANGE UP (ref 43–77)
NITRITE UR-MCNC: NEGATIVE — SIGNIFICANT CHANGE UP
NRBC # BLD: 0 /100 WBCS — SIGNIFICANT CHANGE UP (ref 0–0)
PH UR: 6.5 — SIGNIFICANT CHANGE UP (ref 5–8)
PLATELET # BLD AUTO: 283 K/UL — SIGNIFICANT CHANGE UP (ref 150–400)
POTASSIUM SERPL-MCNC: 4 MMOL/L — SIGNIFICANT CHANGE UP (ref 3.5–5.3)
POTASSIUM SERPL-SCNC: 4 MMOL/L — SIGNIFICANT CHANGE UP (ref 3.5–5.3)
PROT SERPL-MCNC: 7.6 G/DL — SIGNIFICANT CHANGE UP (ref 6–8.3)
PROT UR-MCNC: NEGATIVE — SIGNIFICANT CHANGE UP
PROTHROM AB SERPL-ACNC: 10.6 SEC — SIGNIFICANT CHANGE UP (ref 10–12.9)
RBC # BLD: 4.92 M/UL — SIGNIFICANT CHANGE UP (ref 3.8–5.2)
RBC # FLD: 13.2 % — SIGNIFICANT CHANGE UP (ref 10.3–14.5)
RBC CASTS # UR COMP ASSIST: SIGNIFICANT CHANGE UP /HPF (ref 0–4)
SODIUM SERPL-SCNC: 139 MMOL/L — SIGNIFICANT CHANGE UP (ref 135–145)
SP GR SPEC: 1 — LOW (ref 1.01–1.02)
TROPONIN I SERPL-MCNC: <.017 NG/ML — LOW (ref 0.02–0.06)
UROBILINOGEN FLD QL: NEGATIVE — SIGNIFICANT CHANGE UP
WBC # BLD: 13.31 K/UL — HIGH (ref 3.8–10.5)
WBC # FLD AUTO: 13.31 K/UL — HIGH (ref 3.8–10.5)
WBC UR QL: SIGNIFICANT CHANGE UP /HPF (ref 0–5)

## 2019-08-17 PROCEDURE — 84484 ASSAY OF TROPONIN QUANT: CPT

## 2019-08-17 PROCEDURE — 96374 THER/PROPH/DIAG INJ IV PUSH: CPT

## 2019-08-17 PROCEDURE — 85610 PROTHROMBIN TIME: CPT

## 2019-08-17 PROCEDURE — 99284 EMERGENCY DEPT VISIT MOD MDM: CPT | Mod: 25

## 2019-08-17 PROCEDURE — 71045 X-RAY EXAM CHEST 1 VIEW: CPT | Mod: 26

## 2019-08-17 PROCEDURE — 85652 RBC SED RATE AUTOMATED: CPT

## 2019-08-17 PROCEDURE — 70450 CT HEAD/BRAIN W/O DYE: CPT

## 2019-08-17 PROCEDURE — 36415 COLL VENOUS BLD VENIPUNCTURE: CPT

## 2019-08-17 PROCEDURE — 96375 TX/PRO/DX INJ NEW DRUG ADDON: CPT

## 2019-08-17 PROCEDURE — 81001 URINALYSIS AUTO W/SCOPE: CPT

## 2019-08-17 PROCEDURE — 80053 COMPREHEN METABOLIC PANEL: CPT

## 2019-08-17 PROCEDURE — 70450 CT HEAD/BRAIN W/O DYE: CPT | Mod: 26

## 2019-08-17 PROCEDURE — 93010 ELECTROCARDIOGRAM REPORT: CPT

## 2019-08-17 PROCEDURE — 85027 COMPLETE CBC AUTOMATED: CPT

## 2019-08-17 PROCEDURE — 85730 THROMBOPLASTIN TIME PARTIAL: CPT

## 2019-08-17 PROCEDURE — 96361 HYDRATE IV INFUSION ADD-ON: CPT

## 2019-08-17 PROCEDURE — 71045 X-RAY EXAM CHEST 1 VIEW: CPT

## 2019-08-17 PROCEDURE — 93005 ELECTROCARDIOGRAM TRACING: CPT

## 2019-08-17 PROCEDURE — 99285 EMERGENCY DEPT VISIT HI MDM: CPT

## 2019-08-17 RX ORDER — SODIUM CHLORIDE 9 MG/ML
1000 INJECTION INTRAMUSCULAR; INTRAVENOUS; SUBCUTANEOUS
Refills: 0 | Status: COMPLETED | OUTPATIENT
Start: 2019-08-17 | End: 2019-08-17

## 2019-08-17 RX ORDER — ACETAMINOPHEN 500 MG
650 TABLET ORAL ONCE
Refills: 0 | Status: COMPLETED | OUTPATIENT
Start: 2019-08-17 | End: 2019-08-17

## 2019-08-17 RX ORDER — LABETALOL HCL 100 MG
10 TABLET ORAL ONCE
Refills: 0 | Status: COMPLETED | OUTPATIENT
Start: 2019-08-17 | End: 2019-08-17

## 2019-08-17 RX ORDER — LABETALOL HCL 100 MG
200 TABLET ORAL ONCE
Refills: 0 | Status: DISCONTINUED | OUTPATIENT
Start: 2019-08-17 | End: 2019-08-17

## 2019-08-17 RX ORDER — SODIUM CHLORIDE 9 MG/ML
500 INJECTION INTRAMUSCULAR; INTRAVENOUS; SUBCUTANEOUS ONCE
Refills: 0 | Status: COMPLETED | OUTPATIENT
Start: 2019-08-17 | End: 2019-08-17

## 2019-08-17 RX ADMIN — Medication 650 MILLIGRAM(S): at 20:00

## 2019-08-17 RX ADMIN — SODIUM CHLORIDE 500 MILLILITER(S): 9 INJECTION INTRAMUSCULAR; INTRAVENOUS; SUBCUTANEOUS at 19:30

## 2019-08-17 RX ADMIN — Medication 10 MILLIGRAM(S): at 18:56

## 2019-08-17 RX ADMIN — Medication 650 MILLIGRAM(S): at 18:55

## 2019-08-17 RX ADMIN — SODIUM CHLORIDE 500 MILLILITER(S): 9 INJECTION INTRAMUSCULAR; INTRAVENOUS; SUBCUTANEOUS at 18:55

## 2019-08-17 RX ADMIN — Medication 0.5 MILLIGRAM(S): at 18:56

## 2019-08-17 RX ADMIN — SODIUM CHLORIDE 1000 MILLILITER(S): 9 INJECTION INTRAMUSCULAR; INTRAVENOUS; SUBCUTANEOUS at 20:00

## 2019-08-17 RX ADMIN — SODIUM CHLORIDE 1000 MILLILITER(S): 9 INJECTION INTRAMUSCULAR; INTRAVENOUS; SUBCUTANEOUS at 21:00

## 2019-08-17 NOTE — ED ADULT TRIAGE NOTE - CHIEF COMPLAINT QUOTE
Patient presents with transient left-sided headache that comes and goes for 5 seconds approximately every 2-3 minutes. Patient describes it like a "spasm".

## 2019-08-17 NOTE — ED PROVIDER NOTE - CARE PROVIDER_API CALL
Guido Dubose)  Gastroenterology; Internal Medicine  [address] [place] [address], [address]  [place], [place] [id number]  Phone: [phone]  Fax: [phone]  Follow Up Time:

## 2019-08-17 NOTE — ED PROVIDER NOTE - CLINICAL SUMMARY MEDICAL DECISION MAKING FREE TEXT BOX
89 y/o F with PMH of dementia, LBBB, lung cancer s/p cyber knife, colon cancer, hypothyroidism was BIB her son for left sided HA x 330pm. Patient describes the HA as intermittent episodes of spasms. She took 2 baby ASA at 4pm with no relief in pain. Denies trauma, light or sound sensitivity, neck pain, dizziness, extremity weakness, paresthesias, motor/sensory deficits, CP, SOB, visual changes or all other complaints. PE as noted above. Head CT pending, labs, CXR, pain control, labetalol given as patient was hypertensive on arrival.

## 2019-08-17 NOTE — ED PROVIDER NOTE - ATTENDING CONTRIBUTION TO CARE
9 y/o F with PMH of dementia, LBBB, lung cancer s/p cyber knife, colon cancer, hypothyroidism was BIB her son for left sided HA x 330pm. Patient describes the HA as intermittent episodes of spasms. She took 2 baby ASA at 4pm with no relief in pain. Denies trauma, light or sound sensitivity, neck pain, dizziness, extremity weakness, paresthesias, motor/sensory deficits, CP, SOB, visual changes or all other complaints  physical exam neck supple , no focal deficit vision 20/50 bilaterally , no loss of field of vision , pt bp elevated   Dr. Toure:  I have reviewed and discussed with the PA/ resident the case specifics, including the history, physical assessment, evaluation, conclusion, laboratory results, and medical plan. I agree with the contents, and conclusions. I have personally examined, and interviewed the patient.

## 2019-08-17 NOTE — ED ADULT NURSE NOTE - NSIMPLEMENTINTERV_GEN_ALL_ED
Implemented All Fall with Harm Risk Interventions:  [place] to call system. Call bell, personal items and telephone within reach. Instruct patient to call for assistance. Room bathroom lighting operational. Non-slip footwear when patient is off stretcher. Physically safe environment: no spills, clutter or unnecessary equipment. Stretcher in lowest position, wheels locked, appropriate side rails in place. Provide visual cue, wrist band, yellow gown, etc. Monitor gait and stability. Monitor for mental status changes and reorient to person, place, and time. Review medications for side effects contributing to fall risk. Reinforce activity limits and safety measures with patient and family. Provide visual clues: red socks.

## 2019-08-17 NOTE — ED ADULT NURSE NOTE - OBJECTIVE STATEMENT
Pt arrived to the ER with son c/o headache. Pt reports "spasm" on left side of head. Pt son states she began c/o pain around 1600 and has internment spasm. Pt a&o x2 and reports headache on left side of head. Pt denies any nausea, vomiting, fever, or blurred vision. Pts vision intact at this time.

## 2019-08-17 NOTE — ED ADULT NURSE NOTE - CHPI ED NUR SYMPTOMS NEG
no dizziness/no fever/no vomiting/no weakness/no chills/no nausea/no decreased eating/drinking/no tingling

## 2019-08-17 NOTE — ED PROVIDER NOTE - PROGRESS NOTE DETAILS
s/o dr jimenez reeval pt dispo, follow up ct ROMÁN Meyer: labs reviewed, UA negative, Head CT negative. BP improved. Patients HA is resolved. She is stable for dc

## 2019-08-17 NOTE — ED PROVIDER NOTE - OBJECTIVE STATEMENT
89 y/o F with PMH of dementia, LBBB, lung cancer s/p cyber knife, colon cancer, hypothyroidism was BIB her son for left sided HA x 330pm. Patient describes the HA as intermittent episodes of spasms. She took 2 baby ASA at 4pm with no relief in pain. Denies trauma, light or sound sensitivity, neck pain, dizziness, extremity weakness, paresthesias, motor/sensory deficits, CP, SOB, visual changes or all other complaints

## 2019-08-18 LAB — ERYTHROCYTE [SEDIMENTATION RATE] IN BLOOD: 35 MM/HR — HIGH (ref 0–20)

## 2019-08-23 DIAGNOSIS — R51 HEADACHE: ICD-10-CM

## 2019-08-30 ENCOUNTER — APPOINTMENT (OUTPATIENT)
Dept: INTERNAL MEDICINE | Facility: CLINIC | Age: 84
End: 2019-08-30
Payer: MEDICARE

## 2019-08-30 VITALS — BODY MASS INDEX: 25.51 KG/M2 | WEIGHT: 135 LBS

## 2019-08-30 PROBLEM — C18.9 MALIGNANT NEOPLASM OF COLON, UNSPECIFIED: Chronic | Status: ACTIVE | Noted: 2019-08-17

## 2019-08-30 PROBLEM — C34.90 MALIGNANT NEOPLASM OF UNSPECIFIED PART OF UNSPECIFIED BRONCHUS OR LUNG: Chronic | Status: ACTIVE | Noted: 2019-08-17

## 2019-08-30 PROCEDURE — 99215 OFFICE O/P EST HI 40 MIN: CPT

## 2019-08-31 VITALS
HEART RATE: 80 BPM | RESPIRATION RATE: 15 BRPM | WEIGHT: 135 LBS | BODY MASS INDEX: 25.49 KG/M2 | SYSTOLIC BLOOD PRESSURE: 128 MMHG | HEIGHT: 61 IN | DIASTOLIC BLOOD PRESSURE: 74 MMHG

## 2019-08-31 NOTE — HISTORY OF PRESENT ILLNESS
[Spouse] : spouse [Family Member] : family member [FreeTextEntry1] : Comes to the office for followup to review her medications and discuss her overall health issues with her dementia [de-identified] : 88-year-old woman with a history of dementia colon cancer hyperlipidemia hypothyroidism left bundle branch block and a lung mass as been treated with radiation therapy and osteopenia comes to the office for followup accompanied by her son and . Patient with significant dementia family reports that she denies temperature chills sweats or myalgias he's had no headache sinus congestion sore throat cough wheezing pleurisy chest pain shortness of breath exertional dyspnea lightheadedness she gets occasional complaints of palpitations she's had no dizziness vertigo or syncope she denies abdominal pain nausea vomiting diarrhea or constipation bright red blood per rectum or black stools her appetite has been good her weight is stable she has had no hemoptysis she denies dysuria or gross hematuria she does urinate frequently and gets up several times at night to urinate he denies any significant musculoskeletal complaints and  has had no recent skin rashes\par \par

## 2019-08-31 NOTE — HEALTH RISK ASSESSMENT
[No] : No [No falls in past year] : Patient reported no falls in the past year [0] : 2) Feeling down, depressed, or hopeless: Not at all (0) [] : No [[id number]] : 0

## 2019-08-31 NOTE — ASSESSMENT
[FreeTextEntry1] : Physical examination reveals an elderly woman in no acute distress blood pressure 120/74 height 5 feet 1 inch weight 135 pounds BMI 25.5 heart rate of 80 respiratory rate of 15 HEENT was unremarkable chest was clear her cardiovascular exam was regular abdomen was soft neuro nonfocal patient's dementia is stable good family support she has a scheduled CAT scan of the chest to reevaluate her lung mass that was previously treated by radiation without biopsy is followed by an oncologist Dr. Sanders is up-to-date with her ophthalmologist as a palpitation she did go to see a cardiologist within the past year she had complete blood test in May of 2019.

## 2019-08-31 NOTE — REVIEW OF SYSTEMS
[Nocturia] : nocturia [Joint Stiffness] : joint stiffness [Back Pain] : back pain [Confusion] : confusion [Memory Loss] : memory loss [Unsteady Walking] : ataxia [Negative] : Heme/Lymph [Fever] : no fever [Chills] : no chills [Fatigue] : no fatigue [Night Sweats] : no night sweats [Hot Flashes] : no hot flashes [Recent Change In Weight] : ~T no recent weight change [Discharge] : no discharge [Pain] : no pain [Redness] : no redness [Vision Problems] : no vision problems [Dryness] : no dryness  [Earache] : no earache [Hearing Loss] : no hearing loss [Itching] : no itching [Nosebleed] : no nosebleeds [Nasal Discharge] : no nasal discharge [Hoarseness] : no hoarseness [Postnasal Drip] : no postnasal drip [Sore Throat] : no sore throat [Palpitations] : no palpitations [Chest Pain] : no chest pain [Leg Claudication] : no leg claudication [Lower Ext Edema] : no lower extremity edema [Paroysmal Nocturnal Dyspnea] : no paroysmal nocturnal dyspnea [Orthopnea] : no orthopnea [Shortness Of Breath] : no shortness of breath [Cough] : no cough [Wheezing] : no wheezing [Dyspnea on Exertion] : no dyspnea on exertion [Nausea] : no nausea [Abdominal Pain] : no abdominal pain [Diarrhea] : diarrhea [Constipation] : no constipation [Heartburn] : no heartburn [Vomiting] : no vomiting [Melena] : no melena [Dysuria] : no dysuria [Incontinence] : no incontinence [Hematuria] : no hematuria [Poor Libido] : libido not poor [Vaginal Discharge] : no vaginal discharge [Frequency] : no frequency [Dysmenorrhea] : no dysmenorrhea [Joint Swelling] : no joint swelling [Joint Pain] : no joint pain [Muscle Weakness] : no muscle weakness [Muscle Pain] : no muscle pain [Mole Changes] : no mole changes [Nail Changes] : no nail changes [Skin Rash] : no skin rash [Hair Changes] : no hair changes [Headache] : no headache [Dizziness] : no dizziness [Suicidal] : not suicidal [Insomnia] : no insomnia [Fainting] : no fainting [Anxiety] : no anxiety [Easy Bleeding] : no easy bleeding [Depression] : no depression [Easy Bruising] : no easy bruising [Swollen Glands] : no swollen glands

## 2019-08-31 NOTE — PHYSICAL EXAM
[No Acute Distress] : no acute distress [Well Nourished] : well nourished [Well Developed] : well developed [Well-Appearing] : well-appearing [Normal Voice/Communication] : normal voice/communication [Normal Sclera/Conjunctiva] : normal sclera/conjunctiva [PERRL] : pupils equal round and reactive to light [EOMI] : extraocular movements intact [Normal Outer Ear/Nose] : the outer ears and nose were normal in appearance [Normal Oropharynx] : the oropharynx was normal [Normal TMs] : both tympanic membranes were normal [Normal Nasal Mucosa] : the nasal mucosa was normal [No JVD] : no jugular venous distention [No Lymphadenopathy] : no lymphadenopathy [Supple] : supple [Thyroid Normal, No Nodules] : the thyroid was normal and there were no nodules present [No Accessory Muscle Use] : no accessory muscle use [No Respiratory Distress] : no respiratory distress  [Clear to Auscultation] : lungs were clear to auscultation bilaterally [Normal Rate] : normal rate  [Regular Rhythm] : with a regular rhythm [Normal S1, S2] : normal S1 and S2 [No Carotid Bruits] : no carotid bruits [No Murmur] : no murmur heard [No Abdominal Bruit] : a ~M bruit was not heard ~T in the abdomen [Pedal Pulses Present] : the pedal pulses are present [No Varicosities] : no varicosities [No Edema] : there was no peripheral edema [No Palpable Aorta] : no palpable aorta [No Extremity Clubbing/Cyanosis] : no extremity clubbing/cyanosis [Declined Breast Exam] : declined breast exam  [Soft] : abdomen soft [Non Tender] : non-tender [Non-distended] : non-distended [No Masses] : no abdominal mass palpated [No HSM] : no HSM [Normal Bowel Sounds] : normal bowel sounds [No Hernias] : no hernias [Declined Rectal Exam] : declined rectal exam [Normal Supraclavicular Nodes] : no supraclavicular lymphadenopathy [Normal Posterior Cervical Nodes] : no posterior cervical lymphadenopathy [Normal Axillary Nodes] : no axillary lymphadenopathy [Normal Anterior Cervical Nodes] : no anterior cervical lymphadenopathy [Normal Inguinal Nodes] : no inguinal lymphadenopathy [Normal Femoral Nodes] : no femoral lymphadenopathy [No CVA Tenderness] : no CVA  tenderness [No Spinal Tenderness] : no spinal tenderness [Grossly Normal Strength/Tone] : grossly normal strength/tone [No Joint Swelling] : no joint swelling [No Rash] : no rash [No Skin Lesions] : no skin lesions [Coordination Grossly Intact] : coordination grossly intact [No Focal Deficits] : no focal deficits [Normal Gait] : normal gait [Deep Tendon Reflexes (DTR)] : deep tendon reflexes were 2+ and symmetric [Speech Grossly Normal] : speech grossly normal [Normal Affect] : the affect was normal [Normal Mood] : the mood was normal [Normal Insight/Judgement] : insight and judgment were intact [Kyphosis] : no kyphosis [Scoliosis] : no scoliosis [Acne] : no acne [de-identified] : alert and oriented times 2

## 2019-09-17 ENCOUNTER — OUTPATIENT (OUTPATIENT)
Dept: OUTPATIENT SERVICES | Facility: HOSPITAL | Age: 84
LOS: 1 days | End: 2019-09-17
Payer: MEDICARE

## 2019-09-17 ENCOUNTER — APPOINTMENT (OUTPATIENT)
Dept: RADIOLOGY | Facility: HOSPITAL | Age: 84
End: 2019-09-17
Payer: MEDICARE

## 2019-09-17 DIAGNOSIS — E03.8 OTHER SPECIFIED HYPOTHYROIDISM: ICD-10-CM

## 2019-09-17 PROCEDURE — 77080 DXA BONE DENSITY AXIAL: CPT | Mod: 26

## 2019-09-17 PROCEDURE — 77080 DXA BONE DENSITY AXIAL: CPT

## 2019-09-19 ENCOUNTER — OTHER (OUTPATIENT)
Age: 84
End: 2019-09-19

## 2019-09-24 ENCOUNTER — FORM ENCOUNTER (OUTPATIENT)
Age: 84
End: 2019-09-24

## 2019-09-25 ENCOUNTER — OUTPATIENT (OUTPATIENT)
Dept: OUTPATIENT SERVICES | Facility: HOSPITAL | Age: 84
LOS: 1 days | End: 2019-09-25
Payer: MEDICARE

## 2019-09-25 ENCOUNTER — APPOINTMENT (OUTPATIENT)
Dept: CT IMAGING | Facility: HOSPITAL | Age: 84
End: 2019-09-25
Payer: MEDICARE

## 2019-09-25 DIAGNOSIS — C18.9 MALIGNANT NEOPLASM OF COLON, UNSPECIFIED: ICD-10-CM

## 2019-09-25 DIAGNOSIS — R91.8 OTHER NONSPECIFIC ABNORMAL FINDING OF LUNG FIELD: ICD-10-CM

## 2019-09-25 LAB
BASOPHILS # BLD AUTO: 0.05 K/UL
BASOPHILS NFR BLD AUTO: 0.7 %
EOSINOPHIL # BLD AUTO: 0.15 K/UL
EOSINOPHIL NFR BLD AUTO: 2.1 %
HCT VFR BLD CALC: 45.5 %
HGB BLD-MCNC: 13.8 G/DL
IMM GRANULOCYTES NFR BLD AUTO: 0.3 %
LYMPHOCYTES # BLD AUTO: 2.5 K/UL
LYMPHOCYTES NFR BLD AUTO: 34.2 %
MAN DIFF?: NORMAL
MCHC RBC-ENTMCNC: 26.2 PG
MCHC RBC-ENTMCNC: 30.3 GM/DL
MCV RBC AUTO: 86.5 FL
MONOCYTES # BLD AUTO: 0.45 K/UL
MONOCYTES NFR BLD AUTO: 6.2 %
NEUTROPHILS # BLD AUTO: 4.13 K/UL
NEUTROPHILS NFR BLD AUTO: 56.5 %
PLATELET # BLD AUTO: 321 K/UL
RBC # BLD: 5.26 M/UL
RBC # FLD: 13.5 %
WBC # FLD AUTO: 7.3 K/UL

## 2019-09-25 PROCEDURE — 71250 CT THORAX DX C-: CPT | Mod: 26

## 2019-09-25 PROCEDURE — 71250 CT THORAX DX C-: CPT

## 2019-09-26 LAB
ALBUMIN SERPL ELPH-MCNC: 4.5 G/DL
ALP BLD-CCNC: 78 U/L
ALT SERPL-CCNC: 11 U/L
ANION GAP SERPL CALC-SCNC: 13 MMOL/L
AST SERPL-CCNC: 15 U/L
BILIRUB SERPL-MCNC: 0.4 MG/DL
BUN SERPL-MCNC: 16 MG/DL
CALCIUM SERPL-MCNC: 10.3 MG/DL
CHLORIDE SERPL-SCNC: 102 MMOL/L
CO2 SERPL-SCNC: 28 MMOL/L
CREAT SERPL-MCNC: 0.85 MG/DL
GLUCOSE SERPL-MCNC: 105 MG/DL
POTASSIUM SERPL-SCNC: 4.5 MMOL/L
PROT SERPL-MCNC: 7 G/DL
SODIUM SERPL-SCNC: 143 MMOL/L

## 2019-09-27 LAB — CEA SERPL-MCNC: 17.7 NG/ML

## 2019-10-12 ENCOUNTER — RX RENEWAL (OUTPATIENT)
Age: 84
End: 2019-10-12

## 2019-10-21 ENCOUNTER — OUTPATIENT (OUTPATIENT)
Dept: OUTPATIENT SERVICES | Facility: HOSPITAL | Age: 84
LOS: 1 days | Discharge: HOME | End: 2019-10-21

## 2019-10-21 DIAGNOSIS — C34.90 MALIGNANT NEOPLASM OF UNSPECIFIED PART OF UNSPECIFIED BRONCHUS OR LUNG: ICD-10-CM

## 2019-10-30 ENCOUNTER — APPOINTMENT (OUTPATIENT)
Dept: HEMATOLOGY ONCOLOGY | Facility: CLINIC | Age: 84
End: 2019-10-30
Payer: MEDICARE

## 2019-10-30 VITALS
SYSTOLIC BLOOD PRESSURE: 152 MMHG | OXYGEN SATURATION: 96 % | TEMPERATURE: 97.5 F | WEIGHT: 131.39 LBS | BODY MASS INDEX: 24.83 KG/M2 | DIASTOLIC BLOOD PRESSURE: 75 MMHG | RESPIRATION RATE: 16 BRPM | HEART RATE: 77 BPM

## 2019-10-30 PROCEDURE — 99214 OFFICE O/P EST MOD 30 MIN: CPT

## 2019-10-30 NOTE — ASSESSMENT
[FreeTextEntry1] : #1) Lung neoplasm-clinically stable. Continue expectant surveillance.\par #2) history of colon cancer-discussed with patient/family my concern regarding elevated CEA level-recommend CT scan abdomen/pelvis- rule out recurrent disease. Would also consider updating colonoscopy. However, in light of patient's advanced age and dementia, son wishes for conservative care, and no further evaluation at this time. They are agreeable to followup in 6 months, and repeat CEA level then. Pending followup, they may reconsider obtaining further imaging.\par Patient to increase PO fluids as tolerated.\par Family was given the opportunity to ask questions. Their questions have been answered to their apparent satisfaction.

## 2019-10-30 NOTE — REASON FOR VISIT
[Follow-Up Visit] : a follow-up [Spouse] : spouse [Family Member] : family member [FreeTextEntry2] : lung cancer, colon cancer

## 2019-10-30 NOTE — HISTORY OF PRESENT ILLNESS
[de-identified] : 6/2017-Patient had been having nausea with associated weight loss x approx. 6 weeks. Fell with transient LOC/seizure activity. She was admitted to the hospital and was found to be hypercalcemic and dehydrated. Additional evaluation included a CT Angiogram of the chest which compared to 5/2015 showed an enlarging nodule in the left upper lobe (17 mm), suspicious for bronchogenic carcinoma. No acute pulmonary emboli demonstrated. Followup PET/CT scan showed hypermetabolic mixed solid and ground glass left upper lobe pulmonary nodule (2 cm), increased since 5/2015 compatible with a primary lung neoplasm. 2 small hypermetabolic right intraparotid soft tissue nodules. Probable small FDG avid left cervical lymph node, nonspecific, moderate sized hiatal hernia, containing indeterminate, focal hypermetabolism, diffuse thyroid hypermetabolism. Patient underwent SRS for the primary lung lesion (no biopsy).\par Patient has dementia.\par Son Emmanuel (a dentist). [de-identified] : +History of colon cancer-approx. 2012, s/p resection (Dr. Grayson).\par +History of hypercalcemia-s/p partial parathyroidectomy. [de-identified] : Overall, is feeling quite well physically. No complaints of chest pain, shortness of breath, cough. Good energy level. No abdominal/pelvic pain. No blood per rectum or melena.\par Patient lives with  at home-managing so far. Son assists as needed.\par Son and  present today.

## 2019-11-30 PROBLEM — Z23 INFLUENZA VACCINATION ADMINISTERED AT CURRENT VISIT: Status: ACTIVE | Noted: 2017-10-13

## 2019-11-30 NOTE — PHYSICAL EXAM
[No Acute Distress] : no acute distress [Well Nourished] : well nourished [Well Developed] : well developed [Well-Appearing] : well-appearing [Normal Sclera/Conjunctiva] : normal sclera/conjunctiva [EOMI] : extraocular movements intact [PERRL] : pupils equal round and reactive to light [Normal Voice/Communication] : normal voice/communication [Normal Oropharynx] : the oropharynx was normal [Normal Outer Ear/Nose] : the outer ears and nose were normal in appearance [Normal TMs] : both tympanic membranes were normal [No JVD] : no jugular venous distention [Normal Nasal Mucosa] : the nasal mucosa was normal [No Lymphadenopathy] : no lymphadenopathy [Thyroid Normal, No Nodules] : the thyroid was normal and there were no nodules present [Supple] : supple [No Accessory Muscle Use] : no accessory muscle use [No Respiratory Distress] : no respiratory distress  [Clear to Auscultation] : lungs were clear to auscultation bilaterally [Normal S1, S2] : normal S1 and S2 [Normal Rate] : normal rate  [Regular Rhythm] : with a regular rhythm [No Murmur] : no murmur heard [No Carotid Bruits] : no carotid bruits [No Abdominal Bruit] : a ~M bruit was not heard ~T in the abdomen [No Varicosities] : no varicosities [Pedal Pulses Present] : the pedal pulses are present [No Palpable Aorta] : no palpable aorta [No Extremity Clubbing/Cyanosis] : no extremity clubbing/cyanosis [No Edema] : there was no peripheral edema [Non Tender] : non-tender [Declined Breast Exam] : declined breast exam  [Soft] : abdomen soft [Non-distended] : non-distended [No Masses] : no abdominal mass palpated [No HSM] : no HSM [No Hernias] : no hernias [Normal Bowel Sounds] : normal bowel sounds [Normal Axillary Nodes] : no axillary lymphadenopathy [Declined Rectal Exam] : declined rectal exam [Normal Supraclavicular Nodes] : no supraclavicular lymphadenopathy [Normal Anterior Cervical Nodes] : no anterior cervical lymphadenopathy [Normal Posterior Cervical Nodes] : no posterior cervical lymphadenopathy [Normal Inguinal Nodes] : no inguinal lymphadenopathy [No CVA Tenderness] : no CVA  tenderness [Normal Femoral Nodes] : no femoral lymphadenopathy [No Spinal Tenderness] : no spinal tenderness [Grossly Normal Strength/Tone] : grossly normal strength/tone [No Joint Swelling] : no joint swelling [No Skin Lesions] : no skin lesions [No Rash] : no rash [Coordination Grossly Intact] : coordination grossly intact [No Focal Deficits] : no focal deficits [Normal Gait] : normal gait [Normal Affect] : the affect was normal [Speech Grossly Normal] : speech grossly normal [Deep Tendon Reflexes (DTR)] : deep tendon reflexes were 2+ and symmetric [Normal Insight/Judgement] : insight and judgment were intact [Normal Mood] : the mood was normal [Scoliosis] : no scoliosis [Kyphosis] : no kyphosis [Acne] : no acne [de-identified] : alert and oriented times 2

## 2019-11-30 NOTE — HISTORY OF PRESENT ILLNESS
[Spouse] : spouse [Family Member] : family member [de-identified] : 88-year-old woman comes to the office accompanied by her  and son with a history of dementia moderate to severe previous colon cancer present lung cancer hyperlipidemia hypothyroidism and ulcerative colitis patient denies temperature chills sweats or myalgias she said no headache sinus congestion sore throat cough wheezing pleurisy chest pain shortness of breath exertional dyspnea lightheadedness palpitations dizziness vertigo or syncope she has had no abdominal pain nausea vomiting diarrhea constipation bright red blood per rectum or black stools her appetite has been adequate and her weight stable she denies dysuria or gross hematuria does urinate frequently and does get up at night to urinate she has had no leg edema and denies any recent skin rashes she has had no significant musculoskeletal complaints [FreeTextEntry1] : Comes to the office for followup to review her medications and discuss her overall health recent issues including lung cancer and dementia

## 2019-11-30 NOTE — HEALTH RISK ASSESSMENT
[Yes] : Yes [No falls in past year] : Patient reported no falls in the past year [0] : 2) Feeling down, depressed, or hopeless: Not at all (0) [] : No [[id number]] : 0

## 2019-11-30 NOTE — ASSESSMENT
[FreeTextEntry1] : Physical exam shows an elderly woman in no acute distress blood pressure 136/78 heart rate of 80 respiratory rate of 15 HEENT was unremarkable chest was clear to auscultation and percussion cardiovascular showed a normal S1 and S2 abdomen was soft and nontender extremities show trace bilateral edema neurologic exam was nonfocal patient's dementia slowly worsening patient is scheduled for a repeat imaging study of her lung cancer shortly he is followed actively by Dr. Sanders oncologist he has received palliative radiation therapy family aware situation and prognosis Dr. Sanders does frequent blood tests she is up-to-date with her ophthalmologist continues to eat well and contributed somewhat to her day-to-day needs.The high-dose influenza vaccine was administered patient is up to date with the new shingles vaccine and with the Prevnar 13 vaccine

## 2019-11-30 NOTE — REVIEW OF SYSTEMS
[Nocturia] : nocturia [Joint Pain] : joint pain [Joint Stiffness] : joint stiffness [Back Pain] : back pain [Memory Loss] : memory loss [Confusion] : confusion [Unsteady Walking] : ataxia [Negative] : Heme/Lymph [Fever] : no fever [Chills] : no chills [Fatigue] : no fatigue [Hot Flashes] : no hot flashes [Recent Change In Weight] : ~T no recent weight change [Night Sweats] : no night sweats [Discharge] : no discharge [Pain] : no pain [Redness] : no redness [Vision Problems] : no vision problems [Dryness] : no dryness  [Itching] : no itching [Earache] : no earache [Nosebleed] : no nosebleeds [Hearing Loss] : no hearing loss [Hoarseness] : no hoarseness [Nasal Discharge] : no nasal discharge [Sore Throat] : no sore throat [Postnasal Drip] : no postnasal drip [Chest Pain] : no chest pain [Palpitations] : no palpitations [Leg Claudication] : no leg claudication [Orthopnea] : no orthopnea [Paroxysmal Nocturnal Dyspnea] : no paroxysmal nocturnal dyspnea [Lower Ext Edema] : no lower extremity edema [Shortness Of Breath] : no shortness of breath [Cough] : no cough [Wheezing] : no wheezing [Abdominal Pain] : no abdominal pain [Dyspnea on Exertion] : no dyspnea on exertion [Nausea] : no nausea [Diarrhea] : diarrhea [Constipation] : no constipation [Vomiting] : no vomiting [Heartburn] : no heartburn [Dysuria] : no dysuria [Melena] : no melena [Hematuria] : no hematuria [Incontinence] : no incontinence [Poor Libido] : libido not poor [Frequency] : no frequency [Vaginal Discharge] : no vaginal discharge [Dysmenorrhea] : no dysmenorrhea [Joint Swelling] : no joint swelling [Muscle Weakness] : no muscle weakness [Muscle Pain] : no muscle pain [Nail Changes] : no nail changes [Mole Changes] : no mole changes [Itching] : no itching [Skin Rash] : no skin rash [Headache] : no headache [Dizziness] : no dizziness [Hair Changes] : no hair changes [Fainting] : no fainting [Suicidal] : not suicidal [Insomnia] : no insomnia [Depression] : no depression [Anxiety] : no anxiety [Easy Bruising] : no easy bruising [Swollen Glands] : no swollen glands [Easy Bleeding] : no easy bleeding [FreeTextEntry9] : knee

## 2020-01-01 ENCOUNTER — EMERGENCY (EMERGENCY)
Facility: HOSPITAL | Age: 85
LOS: 1 days | End: 2020-01-01
Attending: EMERGENCY MEDICINE | Admitting: EMERGENCY MEDICINE
Payer: MEDICARE

## 2020-01-01 ENCOUNTER — NON-APPOINTMENT (OUTPATIENT)
Age: 85
End: 2020-01-01

## 2020-01-01 ENCOUNTER — INPATIENT (INPATIENT)
Facility: HOSPITAL | Age: 85
LOS: 1 days | Discharge: HOME | DRG: 392 | End: 2020-07-24
Attending: INTERNAL MEDICINE | Admitting: INTERNAL MEDICINE
Payer: MEDICARE

## 2020-01-01 ENCOUNTER — OUTPATIENT (OUTPATIENT)
Dept: OUTPATIENT SERVICES | Facility: HOSPITAL | Age: 85
LOS: 1 days | End: 2020-01-01
Payer: MEDICARE

## 2020-01-01 ENCOUNTER — APPOINTMENT (OUTPATIENT)
Dept: HEMATOLOGY ONCOLOGY | Facility: CLINIC | Age: 85
End: 2020-01-01

## 2020-01-01 ENCOUNTER — APPOINTMENT (OUTPATIENT)
Dept: HEMATOLOGY ONCOLOGY | Facility: CLINIC | Age: 85
End: 2020-01-01
Payer: MEDICARE

## 2020-01-01 ENCOUNTER — OUTPATIENT (OUTPATIENT)
Dept: OUTPATIENT SERVICES | Facility: HOSPITAL | Age: 85
LOS: 1 days | Discharge: HOME | End: 2020-01-01

## 2020-01-01 ENCOUNTER — APPOINTMENT (OUTPATIENT)
Dept: CT IMAGING | Facility: HOSPITAL | Age: 85
End: 2020-01-01
Payer: MEDICARE

## 2020-01-01 ENCOUNTER — TRANSCRIPTION ENCOUNTER (OUTPATIENT)
Age: 85
End: 2020-01-01

## 2020-01-01 ENCOUNTER — APPOINTMENT (OUTPATIENT)
Dept: INTERNAL MEDICINE | Facility: CLINIC | Age: 85
End: 2020-01-01
Payer: MEDICARE

## 2020-01-01 ENCOUNTER — APPOINTMENT (OUTPATIENT)
Dept: INTERNAL MEDICINE | Facility: CLINIC | Age: 85
End: 2020-01-01

## 2020-01-01 ENCOUNTER — INPATIENT (INPATIENT)
Facility: HOSPITAL | Age: 85
LOS: 3 days | Discharge: HOSPICE | DRG: 91 | End: 2020-07-28
Attending: INTERNAL MEDICINE | Admitting: STUDENT IN AN ORGANIZED HEALTH CARE EDUCATION/TRAINING PROGRAM
Payer: MEDICARE

## 2020-01-01 ENCOUNTER — MED ADMIN CHARGE (OUTPATIENT)
Age: 85
End: 2020-01-01

## 2020-01-01 ENCOUNTER — EMERGENCY (EMERGENCY)
Facility: HOSPITAL | Age: 85
LOS: 1 days | Discharge: HOME | End: 2020-01-01
Attending: INTERNAL MEDICINE | Admitting: INTERNAL MEDICINE
Payer: MEDICARE

## 2020-01-01 ENCOUNTER — APPOINTMENT (OUTPATIENT)
Dept: CARDIOLOGY | Facility: CLINIC | Age: 85
End: 2020-01-01
Payer: MEDICARE

## 2020-01-01 VITALS
SYSTOLIC BLOOD PRESSURE: 137 MMHG | DIASTOLIC BLOOD PRESSURE: 65 MMHG | HEART RATE: 85 BPM | OXYGEN SATURATION: 93 % | RESPIRATION RATE: 16 BRPM | TEMPERATURE: 98 F

## 2020-01-01 VITALS — WEIGHT: 120.37 LBS | HEIGHT: 63 IN

## 2020-01-01 VITALS
OXYGEN SATURATION: 96 % | HEIGHT: 63 IN | DIASTOLIC BLOOD PRESSURE: 59 MMHG | SYSTOLIC BLOOD PRESSURE: 97 MMHG | WEIGHT: 110.01 LBS | TEMPERATURE: 97 F | HEART RATE: 96 BPM | RESPIRATION RATE: 16 BRPM

## 2020-01-01 VITALS
SYSTOLIC BLOOD PRESSURE: 122 MMHG | WEIGHT: 130 LBS | DIASTOLIC BLOOD PRESSURE: 78 MMHG | RESPIRATION RATE: 16 BRPM | BODY MASS INDEX: 24.55 KG/M2 | HEART RATE: 83 BPM | OXYGEN SATURATION: 98 % | HEIGHT: 61 IN

## 2020-01-01 VITALS
RESPIRATION RATE: 16 BRPM | DIASTOLIC BLOOD PRESSURE: 89 MMHG | HEART RATE: 80 BPM | OXYGEN SATURATION: 99 % | BODY MASS INDEX: 24.54 KG/M2 | SYSTOLIC BLOOD PRESSURE: 177 MMHG | TEMPERATURE: 98 F | WEIGHT: 129.85 LBS

## 2020-01-01 VITALS
HEIGHT: 61 IN | HEART RATE: 80 BPM | SYSTOLIC BLOOD PRESSURE: 124 MMHG | WEIGHT: 130 LBS | RESPIRATION RATE: 15 BRPM | DIASTOLIC BLOOD PRESSURE: 64 MMHG | BODY MASS INDEX: 24.55 KG/M2

## 2020-01-01 VITALS
HEIGHT: 64 IN | TEMPERATURE: 98 F | RESPIRATION RATE: 16 BRPM | SYSTOLIC BLOOD PRESSURE: 126 MMHG | HEART RATE: 80 BPM | WEIGHT: 119.93 LBS | DIASTOLIC BLOOD PRESSURE: 75 MMHG | OXYGEN SATURATION: 97 %

## 2020-01-01 VITALS
SYSTOLIC BLOOD PRESSURE: 164 MMHG | OXYGEN SATURATION: 98 % | WEIGHT: 125 LBS | RESPIRATION RATE: 18 BRPM | HEIGHT: 64 IN | TEMPERATURE: 97 F | DIASTOLIC BLOOD PRESSURE: 79 MMHG | HEART RATE: 72 BPM

## 2020-01-01 VITALS
DIASTOLIC BLOOD PRESSURE: 76 MMHG | RESPIRATION RATE: 16 BRPM | SYSTOLIC BLOOD PRESSURE: 136 MMHG | HEART RATE: 78 BPM | TEMPERATURE: 97.9 F | BODY MASS INDEX: 23.98 KG/M2 | WEIGHT: 127 LBS | OXYGEN SATURATION: 98 % | HEIGHT: 61 IN

## 2020-01-01 VITALS — HEART RATE: 80 BPM | SYSTOLIC BLOOD PRESSURE: 120 MMHG | DIASTOLIC BLOOD PRESSURE: 74 MMHG

## 2020-01-01 VITALS
SYSTOLIC BLOOD PRESSURE: 124 MMHG | HEART RATE: 84 BPM | OXYGEN SATURATION: 97 % | BODY MASS INDEX: 24.73 KG/M2 | RESPIRATION RATE: 15 BRPM | TEMPERATURE: 97.5 F | WEIGHT: 131 LBS | HEIGHT: 61 IN | DIASTOLIC BLOOD PRESSURE: 64 MMHG

## 2020-01-01 VITALS
TEMPERATURE: 98 F | DIASTOLIC BLOOD PRESSURE: 89 MMHG | HEART RATE: 86 BPM | OXYGEN SATURATION: 97 % | RESPIRATION RATE: 17 BRPM | SYSTOLIC BLOOD PRESSURE: 173 MMHG

## 2020-01-01 VITALS
DIASTOLIC BLOOD PRESSURE: 89 MMHG | OXYGEN SATURATION: 99 % | TEMPERATURE: 98 F | RESPIRATION RATE: 16 BRPM | SYSTOLIC BLOOD PRESSURE: 177 MMHG | HEART RATE: 80 BPM

## 2020-01-01 VITALS
RESPIRATION RATE: 10 BRPM | HEIGHT: 64 IN | SYSTOLIC BLOOD PRESSURE: 92 MMHG | DIASTOLIC BLOOD PRESSURE: 53 MMHG | WEIGHT: 89.95 LBS | OXYGEN SATURATION: 97 % | TEMPERATURE: 98 F | HEART RATE: 100 BPM

## 2020-01-01 DIAGNOSIS — Z23 ENCOUNTER FOR IMMUNIZATION: ICD-10-CM

## 2020-01-01 DIAGNOSIS — F03.90 UNSPECIFIED DEMENTIA, UNSPECIFIED SEVERITY, WITHOUT BEHAVIORAL DISTURBANCE, PSYCHOTIC DISTURBANCE, MOOD DISTURBANCE, AND ANXIETY: ICD-10-CM

## 2020-01-01 DIAGNOSIS — R19.7 DIARRHEA, UNSPECIFIED: ICD-10-CM

## 2020-01-01 DIAGNOSIS — K51.90 ULCERATIVE COLITIS, UNSPECIFIED, W/OUT COMPLICATIONS: ICD-10-CM

## 2020-01-01 DIAGNOSIS — C34.90 MALIGNANT NEOPLASM OF UNSPECIFIED PART OF UNSPECIFIED BRONCHUS OR LUNG: ICD-10-CM

## 2020-01-01 DIAGNOSIS — R06.89 OTHER ABNORMALITIES OF BREATHING: ICD-10-CM

## 2020-01-01 DIAGNOSIS — R91.8 OTHER NONSPECIFIC ABNORMAL FINDING OF LUNG FIELD: ICD-10-CM

## 2020-01-01 DIAGNOSIS — C18.9 MALIGNANT NEOPLASM OF COLON, UNSPECIFIED: ICD-10-CM

## 2020-01-01 DIAGNOSIS — K52.9 NONINFECTIVE GASTROENTERITIS AND COLITIS, UNSPECIFIED: ICD-10-CM

## 2020-01-01 DIAGNOSIS — Z98.890 OTHER SPECIFIED POSTPROCEDURAL STATES: Chronic | ICD-10-CM

## 2020-01-01 DIAGNOSIS — M85.80 OTHER SPECIFIED DISORDERS OF BONE DENSITY AND STRUCTURE, UNSPECIFIED SITE: ICD-10-CM

## 2020-01-01 DIAGNOSIS — R06.00 DYSPNEA, UNSPECIFIED: ICD-10-CM

## 2020-01-01 DIAGNOSIS — E78.5 HYPERLIPIDEMIA, UNSPECIFIED: ICD-10-CM

## 2020-01-01 DIAGNOSIS — R53.1 WEAKNESS: ICD-10-CM

## 2020-01-01 DIAGNOSIS — K59.00 CONSTIPATION, UNSPECIFIED: ICD-10-CM

## 2020-01-01 DIAGNOSIS — I35.0 NONRHEUMATIC AORTIC (VALVE) STENOSIS: ICD-10-CM

## 2020-01-01 DIAGNOSIS — F03.90 UNSPECIFIED DEMENTIA W/OUT BEHAVIORAL DISTURBANCE: ICD-10-CM

## 2020-01-01 DIAGNOSIS — R06.02 SHORTNESS OF BREATH: ICD-10-CM

## 2020-01-01 DIAGNOSIS — I44.7 LEFT BUNDLE-BRANCH BLOCK, UNSPECIFIED: ICD-10-CM

## 2020-01-01 DIAGNOSIS — E03.9 HYPOTHYROIDISM, UNSPECIFIED: ICD-10-CM

## 2020-01-01 LAB
ALBUMIN SERPL ELPH-MCNC: 2.5 G/DL — LOW (ref 3.3–5)
ALBUMIN SERPL ELPH-MCNC: 2.8 G/DL — LOW (ref 3.3–5)
ALBUMIN SERPL ELPH-MCNC: 3 G/DL — LOW (ref 3.3–5)
ALBUMIN SERPL ELPH-MCNC: 3.4 G/DL — SIGNIFICANT CHANGE UP (ref 3.3–5)
ALBUMIN SERPL ELPH-MCNC: 4.4 G/DL
ALP BLD-CCNC: 71 U/L
ALP SERPL-CCNC: 51 U/L — SIGNIFICANT CHANGE UP (ref 40–120)
ALP SERPL-CCNC: 53 U/L — SIGNIFICANT CHANGE UP (ref 40–120)
ALP SERPL-CCNC: 57 U/L — SIGNIFICANT CHANGE UP (ref 40–120)
ALP SERPL-CCNC: 65 U/L — SIGNIFICANT CHANGE UP (ref 40–120)
ALT FLD-CCNC: 17 U/L — SIGNIFICANT CHANGE UP (ref 10–45)
ALT FLD-CCNC: 21 U/L — SIGNIFICANT CHANGE UP (ref 10–45)
ALT FLD-CCNC: 22 U/L — SIGNIFICANT CHANGE UP (ref 10–45)
ALT FLD-CCNC: 29 U/L — SIGNIFICANT CHANGE UP (ref 10–45)
ALT SERPL-CCNC: 13 U/L
ANION GAP SERPL CALC-SCNC: 10 MMOL/L — SIGNIFICANT CHANGE UP (ref 5–17)
ANION GAP SERPL CALC-SCNC: 11 MMOL/L — SIGNIFICANT CHANGE UP (ref 5–17)
ANION GAP SERPL CALC-SCNC: 12 MMOL/L — SIGNIFICANT CHANGE UP (ref 5–17)
ANION GAP SERPL CALC-SCNC: 13 MMOL/L — SIGNIFICANT CHANGE UP (ref 5–17)
ANION GAP SERPL CALC-SCNC: 14 MMOL/L — SIGNIFICANT CHANGE UP (ref 5–17)
ANION GAP SERPL CALC-SCNC: 16 MMOL/L
ANION GAP SERPL CALC-SCNC: 5 MMOL/L — SIGNIFICANT CHANGE UP (ref 5–17)
ANION GAP SERPL CALC-SCNC: 9 MMOL/L — SIGNIFICANT CHANGE UP (ref 5–17)
APPEARANCE UR: CLEAR — SIGNIFICANT CHANGE UP
APTT BLD: 29.6 SEC — SIGNIFICANT CHANGE UP (ref 27.5–35.5)
AST SERPL-CCNC: 23 U/L
AST SERPL-CCNC: 23 U/L — SIGNIFICANT CHANGE UP (ref 10–40)
AST SERPL-CCNC: 27 U/L — SIGNIFICANT CHANGE UP (ref 10–40)
AST SERPL-CCNC: 34 U/L — SIGNIFICANT CHANGE UP (ref 10–40)
AST SERPL-CCNC: 61 U/L — HIGH (ref 10–40)
BASOPHILS # BLD AUTO: 0.02 K/UL — SIGNIFICANT CHANGE UP (ref 0–0.2)
BASOPHILS # BLD AUTO: 0.04 K/UL — SIGNIFICANT CHANGE UP (ref 0–0.2)
BASOPHILS # BLD AUTO: 0.05 K/UL
BASOPHILS # BLD AUTO: 0.05 K/UL — SIGNIFICANT CHANGE UP (ref 0–0.2)
BASOPHILS # BLD AUTO: 0.06 K/UL — SIGNIFICANT CHANGE UP (ref 0–0.2)
BASOPHILS NFR BLD AUTO: 0.3 % — SIGNIFICANT CHANGE UP (ref 0–2)
BASOPHILS NFR BLD AUTO: 0.4 % — SIGNIFICANT CHANGE UP (ref 0–2)
BASOPHILS NFR BLD AUTO: 0.5 % — SIGNIFICANT CHANGE UP (ref 0–2)
BASOPHILS NFR BLD AUTO: 0.7 %
BASOPHILS NFR BLD AUTO: 0.8 % — SIGNIFICANT CHANGE UP (ref 0–2)
BILIRUB SERPL-MCNC: 0.3 MG/DL
BILIRUB SERPL-MCNC: 0.4 MG/DL — SIGNIFICANT CHANGE UP (ref 0.2–1.2)
BILIRUB SERPL-MCNC: 0.5 MG/DL — SIGNIFICANT CHANGE UP (ref 0.2–1.2)
BILIRUB SERPL-MCNC: 0.7 MG/DL — SIGNIFICANT CHANGE UP (ref 0.2–1.2)
BILIRUB SERPL-MCNC: 0.9 MG/DL — SIGNIFICANT CHANGE UP (ref 0.2–1.2)
BILIRUB UR-MCNC: NEGATIVE — SIGNIFICANT CHANGE UP
BUN SERPL-MCNC: 10 MG/DL — SIGNIFICANT CHANGE UP (ref 7–23)
BUN SERPL-MCNC: 11 MG/DL — SIGNIFICANT CHANGE UP (ref 7–23)
BUN SERPL-MCNC: 12 MG/DL
BUN SERPL-MCNC: 15 MG/DL — SIGNIFICANT CHANGE UP (ref 7–23)
BUN SERPL-MCNC: 16 MG/DL — SIGNIFICANT CHANGE UP (ref 7–23)
BUN SERPL-MCNC: 18 MG/DL — SIGNIFICANT CHANGE UP (ref 7–23)
BUN SERPL-MCNC: 20 MG/DL — SIGNIFICANT CHANGE UP (ref 7–23)
BUN SERPL-MCNC: 6 MG/DL — LOW (ref 7–23)
BUN SERPL-MCNC: 8 MG/DL — SIGNIFICANT CHANGE UP (ref 7–23)
CALCIUM SERPL-MCNC: 8.6 MG/DL — SIGNIFICANT CHANGE UP (ref 8.4–10.5)
CALCIUM SERPL-MCNC: 9.2 MG/DL — SIGNIFICANT CHANGE UP (ref 8.4–10.5)
CALCIUM SERPL-MCNC: 9.3 MG/DL — SIGNIFICANT CHANGE UP (ref 8.4–10.5)
CALCIUM SERPL-MCNC: 9.3 MG/DL — SIGNIFICANT CHANGE UP (ref 8.4–10.5)
CALCIUM SERPL-MCNC: 9.4 MG/DL — SIGNIFICANT CHANGE UP (ref 8.4–10.5)
CALCIUM SERPL-MCNC: 9.5 MG/DL — SIGNIFICANT CHANGE UP (ref 8.4–10.5)
CALCIUM SERPL-MCNC: 9.6 MG/DL — SIGNIFICANT CHANGE UP (ref 8.4–10.5)
CALCIUM SERPL-MCNC: 9.7 MG/DL
CALCIUM SERPL-MCNC: 9.7 MG/DL — SIGNIFICANT CHANGE UP (ref 8.4–10.5)
CEA SERPL-MCNC: 22.5 NG/ML
CHLORIDE SERPL-SCNC: 101 MMOL/L
CHLORIDE SERPL-SCNC: 102 MMOL/L — SIGNIFICANT CHANGE UP (ref 96–108)
CHLORIDE SERPL-SCNC: 103 MMOL/L — SIGNIFICANT CHANGE UP (ref 96–108)
CHLORIDE SERPL-SCNC: 104 MMOL/L — SIGNIFICANT CHANGE UP (ref 96–108)
CHLORIDE SERPL-SCNC: 106 MMOL/L — SIGNIFICANT CHANGE UP (ref 96–108)
CHLORIDE SERPL-SCNC: 106 MMOL/L — SIGNIFICANT CHANGE UP (ref 96–108)
CHLORIDE SERPL-SCNC: 107 MMOL/L — SIGNIFICANT CHANGE UP (ref 96–108)
CHLORIDE SERPL-SCNC: 107 MMOL/L — SIGNIFICANT CHANGE UP (ref 96–108)
CHLORIDE SERPL-SCNC: 109 MMOL/L — HIGH (ref 96–108)
CO2 SERPL-SCNC: 24 MMOL/L
CO2 SERPL-SCNC: 24 MMOL/L — SIGNIFICANT CHANGE UP (ref 22–31)
CO2 SERPL-SCNC: 25 MMOL/L — SIGNIFICANT CHANGE UP (ref 22–31)
CO2 SERPL-SCNC: 25 MMOL/L — SIGNIFICANT CHANGE UP (ref 22–31)
CO2 SERPL-SCNC: 26 MMOL/L — SIGNIFICANT CHANGE UP (ref 22–31)
CO2 SERPL-SCNC: 26 MMOL/L — SIGNIFICANT CHANGE UP (ref 22–31)
CO2 SERPL-SCNC: 27 MMOL/L — SIGNIFICANT CHANGE UP (ref 22–31)
CO2 SERPL-SCNC: 27 MMOL/L — SIGNIFICANT CHANGE UP (ref 22–31)
CO2 SERPL-SCNC: 29 MMOL/L — SIGNIFICANT CHANGE UP (ref 22–31)
COLOR SPEC: YELLOW — SIGNIFICANT CHANGE UP
CREAT SERPL-MCNC: 0.71 MG/DL — SIGNIFICANT CHANGE UP (ref 0.5–1.3)
CREAT SERPL-MCNC: 0.79 MG/DL — SIGNIFICANT CHANGE UP (ref 0.5–1.3)
CREAT SERPL-MCNC: 0.84 MG/DL
CREAT SERPL-MCNC: 0.84 MG/DL — SIGNIFICANT CHANGE UP (ref 0.5–1.3)
CREAT SERPL-MCNC: 0.86 MG/DL — SIGNIFICANT CHANGE UP (ref 0.5–1.3)
CREAT SERPL-MCNC: 0.87 MG/DL — SIGNIFICANT CHANGE UP (ref 0.5–1.3)
CREAT SERPL-MCNC: 0.88 MG/DL — SIGNIFICANT CHANGE UP (ref 0.5–1.3)
CREAT SERPL-MCNC: 0.89 MG/DL — SIGNIFICANT CHANGE UP (ref 0.5–1.3)
CREAT SERPL-MCNC: 0.9 MG/DL — SIGNIFICANT CHANGE UP (ref 0.5–1.3)
CREAT SERPL-MCNC: 0.96 MG/DL — SIGNIFICANT CHANGE UP (ref 0.5–1.3)
CREAT SERPL-MCNC: 0.96 MG/DL — SIGNIFICANT CHANGE UP (ref 0.5–1.3)
CULTURE RESULTS: SIGNIFICANT CHANGE UP
CULTURE RESULTS: SIGNIFICANT CHANGE UP
DIFF PNL FLD: ABNORMAL
EOSINOPHIL # BLD AUTO: 0.04 K/UL — SIGNIFICANT CHANGE UP (ref 0–0.5)
EOSINOPHIL # BLD AUTO: 0.11 K/UL — SIGNIFICANT CHANGE UP (ref 0–0.5)
EOSINOPHIL # BLD AUTO: 0.12 K/UL — SIGNIFICANT CHANGE UP (ref 0–0.5)
EOSINOPHIL # BLD AUTO: 0.13 K/UL
EOSINOPHIL # BLD AUTO: 0.2 K/UL — SIGNIFICANT CHANGE UP (ref 0–0.5)
EOSINOPHIL NFR BLD AUTO: 0.4 % — SIGNIFICANT CHANGE UP (ref 0–6)
EOSINOPHIL NFR BLD AUTO: 1.3 % — SIGNIFICANT CHANGE UP (ref 0–6)
EOSINOPHIL NFR BLD AUTO: 1.4 % — SIGNIFICANT CHANGE UP (ref 0–6)
EOSINOPHIL NFR BLD AUTO: 1.8 %
EOSINOPHIL NFR BLD AUTO: 2.7 % — SIGNIFICANT CHANGE UP (ref 0–6)
GLUCOSE BLDC GLUCOMTR-MCNC: 101 MG/DL — HIGH (ref 70–99)
GLUCOSE SERPL-MCNC: 102 MG/DL
GLUCOSE SERPL-MCNC: 119 MG/DL — HIGH (ref 70–99)
GLUCOSE SERPL-MCNC: 127 MG/DL — HIGH (ref 70–99)
GLUCOSE SERPL-MCNC: 128 MG/DL — HIGH (ref 70–99)
GLUCOSE SERPL-MCNC: 79 MG/DL — SIGNIFICANT CHANGE UP (ref 70–99)
GLUCOSE SERPL-MCNC: 85 MG/DL — SIGNIFICANT CHANGE UP (ref 70–99)
GLUCOSE SERPL-MCNC: 87 MG/DL — SIGNIFICANT CHANGE UP (ref 70–99)
GLUCOSE SERPL-MCNC: 94 MG/DL — SIGNIFICANT CHANGE UP (ref 70–99)
GLUCOSE SERPL-MCNC: 95 MG/DL — SIGNIFICANT CHANGE UP (ref 70–99)
GLUCOSE SERPL-MCNC: 96 MG/DL — SIGNIFICANT CHANGE UP (ref 70–99)
GLUCOSE SERPL-MCNC: 97 MG/DL — SIGNIFICANT CHANGE UP (ref 70–99)
GLUCOSE UR QL: NEGATIVE — SIGNIFICANT CHANGE UP
HCT VFR BLD CALC: 37.3 % — SIGNIFICANT CHANGE UP (ref 34.5–45)
HCT VFR BLD CALC: 39.5 % — SIGNIFICANT CHANGE UP (ref 34.5–45)
HCT VFR BLD CALC: 39.5 % — SIGNIFICANT CHANGE UP (ref 34.5–45)
HCT VFR BLD CALC: 40.6 % — SIGNIFICANT CHANGE UP (ref 34.5–45)
HCT VFR BLD CALC: 40.9 % — SIGNIFICANT CHANGE UP (ref 34.5–45)
HCT VFR BLD CALC: 42.2 % — SIGNIFICANT CHANGE UP (ref 34.5–45)
HCT VFR BLD CALC: 42.3 %
HCT VFR BLD CALC: 43.7 % — SIGNIFICANT CHANGE UP (ref 34.5–45)
HCT VFR BLD CALC: 43.9 % — SIGNIFICANT CHANGE UP (ref 34.5–45)
HGB BLD-MCNC: 12 G/DL — SIGNIFICANT CHANGE UP (ref 11.5–15.5)
HGB BLD-MCNC: 12.8 G/DL — SIGNIFICANT CHANGE UP (ref 11.5–15.5)
HGB BLD-MCNC: 13 G/DL — SIGNIFICANT CHANGE UP (ref 11.5–15.5)
HGB BLD-MCNC: 13 G/DL — SIGNIFICANT CHANGE UP (ref 11.5–15.5)
HGB BLD-MCNC: 13.5 G/DL
HGB BLD-MCNC: 13.5 G/DL — SIGNIFICANT CHANGE UP (ref 11.5–15.5)
HGB BLD-MCNC: 13.8 G/DL — SIGNIFICANT CHANGE UP (ref 11.5–15.5)
HGB BLD-MCNC: 13.8 G/DL — SIGNIFICANT CHANGE UP (ref 11.5–15.5)
HGB BLD-MCNC: 13.9 G/DL — SIGNIFICANT CHANGE UP (ref 11.5–15.5)
IMM GRANULOCYTES NFR BLD AUTO: 0.3 %
IMM GRANULOCYTES NFR BLD AUTO: 0.4 % — SIGNIFICANT CHANGE UP (ref 0–1.5)
IMM GRANULOCYTES NFR BLD AUTO: 0.4 % — SIGNIFICANT CHANGE UP (ref 0–1.5)
IMM GRANULOCYTES NFR BLD AUTO: 0.5 % — SIGNIFICANT CHANGE UP (ref 0–1.5)
IMM GRANULOCYTES NFR BLD AUTO: 0.8 % — SIGNIFICANT CHANGE UP (ref 0–1.5)
INR BLD: 1.1 RATIO — SIGNIFICANT CHANGE UP (ref 0.88–1.16)
KETONES UR-MCNC: ABNORMAL
LACTATE SERPL-SCNC: 0.8 MMOL/L — SIGNIFICANT CHANGE UP (ref 0.7–2)
LEUKOCYTE ESTERASE UR-ACNC: ABNORMAL
LYMPHOCYTES # BLD AUTO: 1.18 K/UL — SIGNIFICANT CHANGE UP (ref 1–3.3)
LYMPHOCYTES # BLD AUTO: 1.68 K/UL — SIGNIFICANT CHANGE UP (ref 1–3.3)
LYMPHOCYTES # BLD AUTO: 1.79 K/UL — SIGNIFICANT CHANGE UP (ref 1–3.3)
LYMPHOCYTES # BLD AUTO: 1.8 K/UL — SIGNIFICANT CHANGE UP (ref 1–3.3)
LYMPHOCYTES # BLD AUTO: 15 % — SIGNIFICANT CHANGE UP (ref 13–44)
LYMPHOCYTES # BLD AUTO: 16.6 % — SIGNIFICANT CHANGE UP (ref 13–44)
LYMPHOCYTES # BLD AUTO: 19.4 % — SIGNIFICANT CHANGE UP (ref 13–44)
LYMPHOCYTES # BLD AUTO: 2.43 K/UL
LYMPHOCYTES # BLD AUTO: 22.7 % — SIGNIFICANT CHANGE UP (ref 13–44)
LYMPHOCYTES NFR BLD AUTO: 33.4 %
MAGNESIUM SERPL-MCNC: 1.8 MG/DL — SIGNIFICANT CHANGE UP (ref 1.6–2.6)
MAGNESIUM SERPL-MCNC: 1.9 MG/DL — SIGNIFICANT CHANGE UP (ref 1.6–2.6)
MAGNESIUM SERPL-MCNC: 2 MG/DL — SIGNIFICANT CHANGE UP (ref 1.6–2.6)
MAN DIFF?: NORMAL
MCHC RBC-ENTMCNC: 25.7 PG — LOW (ref 27–34)
MCHC RBC-ENTMCNC: 25.8 PG — LOW (ref 27–34)
MCHC RBC-ENTMCNC: 26.4 PG — LOW (ref 27–34)
MCHC RBC-ENTMCNC: 26.4 PG — LOW (ref 27–34)
MCHC RBC-ENTMCNC: 26.6 PG — LOW (ref 27–34)
MCHC RBC-ENTMCNC: 26.6 PG — LOW (ref 27–34)
MCHC RBC-ENTMCNC: 26.7 PG
MCHC RBC-ENTMCNC: 27 PG — SIGNIFICANT CHANGE UP (ref 27–34)
MCHC RBC-ENTMCNC: 27.1 PG — SIGNIFICANT CHANGE UP (ref 27–34)
MCHC RBC-ENTMCNC: 31.4 GM/DL — LOW (ref 32–36)
MCHC RBC-ENTMCNC: 31.8 GM/DL — LOW (ref 32–36)
MCHC RBC-ENTMCNC: 31.8 GM/DL — LOW (ref 32–36)
MCHC RBC-ENTMCNC: 31.9 GM/DL
MCHC RBC-ENTMCNC: 32.2 GM/DL — SIGNIFICANT CHANGE UP (ref 32–36)
MCHC RBC-ENTMCNC: 32.4 GM/DL — SIGNIFICANT CHANGE UP (ref 32–36)
MCHC RBC-ENTMCNC: 32.7 GM/DL — SIGNIFICANT CHANGE UP (ref 32–36)
MCHC RBC-ENTMCNC: 32.9 GM/DL — SIGNIFICANT CHANGE UP (ref 32–36)
MCHC RBC-ENTMCNC: 33.3 GM/DL — SIGNIFICANT CHANGE UP (ref 32–36)
MCV RBC AUTO: 80.1 FL — SIGNIFICANT CHANGE UP (ref 80–100)
MCV RBC AUTO: 81.2 FL — SIGNIFICANT CHANGE UP (ref 80–100)
MCV RBC AUTO: 81.3 FL — SIGNIFICANT CHANGE UP (ref 80–100)
MCV RBC AUTO: 81.8 FL — SIGNIFICANT CHANGE UP (ref 80–100)
MCV RBC AUTO: 82 FL — SIGNIFICANT CHANGE UP (ref 80–100)
MCV RBC AUTO: 82 FL — SIGNIFICANT CHANGE UP (ref 80–100)
MCV RBC AUTO: 83.1 FL — SIGNIFICANT CHANGE UP (ref 80–100)
MCV RBC AUTO: 83.5 FL — SIGNIFICANT CHANGE UP (ref 80–100)
MCV RBC AUTO: 83.6 FL
MONOCYTES # BLD AUTO: 0.57 K/UL
MONOCYTES # BLD AUTO: 0.68 K/UL — SIGNIFICANT CHANGE UP (ref 0–0.9)
MONOCYTES # BLD AUTO: 0.71 K/UL — SIGNIFICANT CHANGE UP (ref 0–0.9)
MONOCYTES # BLD AUTO: 0.85 K/UL — SIGNIFICANT CHANGE UP (ref 0–0.9)
MONOCYTES # BLD AUTO: 0.92 K/UL — HIGH (ref 0–0.9)
MONOCYTES NFR BLD AUTO: 7.8 %
MONOCYTES NFR BLD AUTO: 7.8 % — SIGNIFICANT CHANGE UP (ref 2–14)
MONOCYTES NFR BLD AUTO: 9 % — SIGNIFICANT CHANGE UP (ref 2–14)
MONOCYTES NFR BLD AUTO: 9.2 % — SIGNIFICANT CHANGE UP (ref 2–14)
MONOCYTES NFR BLD AUTO: 9.9 % — SIGNIFICANT CHANGE UP (ref 2–14)
NEUTROPHILS # BLD AUTO: 4.07 K/UL
NEUTROPHILS # BLD AUTO: 4.73 K/UL — SIGNIFICANT CHANGE UP (ref 1.8–7.4)
NEUTROPHILS # BLD AUTO: 5.81 K/UL — SIGNIFICANT CHANGE UP (ref 1.8–7.4)
NEUTROPHILS # BLD AUTO: 6.32 K/UL — SIGNIFICANT CHANGE UP (ref 1.8–7.4)
NEUTROPHILS # BLD AUTO: 8.1 K/UL — HIGH (ref 1.8–7.4)
NEUTROPHILS NFR BLD AUTO: 56 %
NEUTROPHILS NFR BLD AUTO: 63.8 % — SIGNIFICANT CHANGE UP (ref 43–77)
NEUTROPHILS NFR BLD AUTO: 68.4 % — SIGNIFICANT CHANGE UP (ref 43–77)
NEUTROPHILS NFR BLD AUTO: 73.9 % — SIGNIFICANT CHANGE UP (ref 43–77)
NEUTROPHILS NFR BLD AUTO: 74.4 % — SIGNIFICANT CHANGE UP (ref 43–77)
NITRITE UR-MCNC: NEGATIVE — SIGNIFICANT CHANGE UP
NRBC # BLD: 0 /100 WBCS — SIGNIFICANT CHANGE UP (ref 0–0)
NT-PROBNP SERPL-SCNC: 1367 PG/ML — HIGH (ref 0–300)
NT-PROBNP SERPL-SCNC: 390 PG/ML — HIGH (ref 0–300)
PH UR: 6 — SIGNIFICANT CHANGE UP (ref 5–8)
PLATELET # BLD AUTO: 220 K/UL — SIGNIFICANT CHANGE UP (ref 150–400)
PLATELET # BLD AUTO: 222 K/UL — SIGNIFICANT CHANGE UP (ref 150–400)
PLATELET # BLD AUTO: 252 K/UL — SIGNIFICANT CHANGE UP (ref 150–400)
PLATELET # BLD AUTO: 253 K/UL — SIGNIFICANT CHANGE UP (ref 150–400)
PLATELET # BLD AUTO: 280 K/UL — SIGNIFICANT CHANGE UP (ref 150–400)
PLATELET # BLD AUTO: 308 K/UL — SIGNIFICANT CHANGE UP (ref 150–400)
PLATELET # BLD AUTO: 317 K/UL
PLATELET # BLD AUTO: 342 K/UL — SIGNIFICANT CHANGE UP (ref 150–400)
PLATELET # BLD AUTO: 359 K/UL — SIGNIFICANT CHANGE UP (ref 150–400)
POTASSIUM SERPL-MCNC: 2.7 MMOL/L — CRITICAL LOW (ref 3.5–5.3)
POTASSIUM SERPL-MCNC: 2.8 MMOL/L — CRITICAL LOW (ref 3.5–5.3)
POTASSIUM SERPL-MCNC: 2.9 MMOL/L — CRITICAL LOW (ref 3.5–5.3)
POTASSIUM SERPL-MCNC: 3.2 MMOL/L — LOW (ref 3.5–5.3)
POTASSIUM SERPL-MCNC: 3.3 MMOL/L — LOW (ref 3.5–5.3)
POTASSIUM SERPL-MCNC: 3.5 MMOL/L — SIGNIFICANT CHANGE UP (ref 3.5–5.3)
POTASSIUM SERPL-MCNC: 3.5 MMOL/L — SIGNIFICANT CHANGE UP (ref 3.5–5.3)
POTASSIUM SERPL-MCNC: 3.8 MMOL/L — SIGNIFICANT CHANGE UP (ref 3.5–5.3)
POTASSIUM SERPL-MCNC: 3.8 MMOL/L — SIGNIFICANT CHANGE UP (ref 3.5–5.3)
POTASSIUM SERPL-MCNC: 6.1 MMOL/L — HIGH (ref 3.5–5.3)
POTASSIUM SERPL-SCNC: 2.7 MMOL/L — CRITICAL LOW (ref 3.5–5.3)
POTASSIUM SERPL-SCNC: 2.8 MMOL/L — CRITICAL LOW (ref 3.5–5.3)
POTASSIUM SERPL-SCNC: 2.9 MMOL/L — CRITICAL LOW (ref 3.5–5.3)
POTASSIUM SERPL-SCNC: 3.2 MMOL/L — LOW (ref 3.5–5.3)
POTASSIUM SERPL-SCNC: 3.3 MMOL/L — LOW (ref 3.5–5.3)
POTASSIUM SERPL-SCNC: 3.5 MMOL/L — SIGNIFICANT CHANGE UP (ref 3.5–5.3)
POTASSIUM SERPL-SCNC: 3.5 MMOL/L — SIGNIFICANT CHANGE UP (ref 3.5–5.3)
POTASSIUM SERPL-SCNC: 3.7 MMOL/L
POTASSIUM SERPL-SCNC: 3.8 MMOL/L — SIGNIFICANT CHANGE UP (ref 3.5–5.3)
POTASSIUM SERPL-SCNC: 3.8 MMOL/L — SIGNIFICANT CHANGE UP (ref 3.5–5.3)
POTASSIUM SERPL-SCNC: 6.1 MMOL/L — HIGH (ref 3.5–5.3)
PROT SERPL-MCNC: 5.9 G/DL — LOW (ref 6–8.3)
PROT SERPL-MCNC: 6.8 G/DL
PROT SERPL-MCNC: 6.8 G/DL — SIGNIFICANT CHANGE UP (ref 6–8.3)
PROT SERPL-MCNC: 7 G/DL — SIGNIFICANT CHANGE UP (ref 6–8.3)
PROT SERPL-MCNC: 7.1 G/DL — SIGNIFICANT CHANGE UP (ref 6–8.3)
PROT UR-MCNC: NEGATIVE — SIGNIFICANT CHANGE UP
PROTHROM AB SERPL-ACNC: 13.2 SEC — SIGNIFICANT CHANGE UP (ref 10.6–13.6)
RBC # BLD: 4.55 M/UL — SIGNIFICANT CHANGE UP (ref 3.8–5.2)
RBC # BLD: 4.73 M/UL — SIGNIFICANT CHANGE UP (ref 3.8–5.2)
RBC # BLD: 4.82 M/UL — SIGNIFICANT CHANGE UP (ref 3.8–5.2)
RBC # BLD: 4.92 M/UL — SIGNIFICANT CHANGE UP (ref 3.8–5.2)
RBC # BLD: 5.06 M/UL
RBC # BLD: 5.07 M/UL — SIGNIFICANT CHANGE UP (ref 3.8–5.2)
RBC # BLD: 5.19 M/UL — SIGNIFICANT CHANGE UP (ref 3.8–5.2)
RBC # BLD: 5.37 M/UL — HIGH (ref 3.8–5.2)
RBC # BLD: 5.38 M/UL — HIGH (ref 3.8–5.2)
RBC # FLD: 12.9 % — SIGNIFICANT CHANGE UP (ref 10.3–14.5)
RBC # FLD: 13.1 % — SIGNIFICANT CHANGE UP (ref 10.3–14.5)
RBC # FLD: 13.1 % — SIGNIFICANT CHANGE UP (ref 10.3–14.5)
RBC # FLD: 13.2 %
RBC # FLD: 13.2 % — SIGNIFICANT CHANGE UP (ref 10.3–14.5)
RBC # FLD: 13.4 % — SIGNIFICANT CHANGE UP (ref 10.3–14.5)
SARS-COV-2 IGG SERPL QL IA: NEGATIVE — SIGNIFICANT CHANGE UP
SARS-COV-2 IGM SERPL IA-ACNC: <3.8 AU/ML — SIGNIFICANT CHANGE UP
SARS-COV-2 RNA SPEC QL NAA+PROBE: SIGNIFICANT CHANGE UP
SODIUM SERPL-SCNC: 137 MMOL/L — SIGNIFICANT CHANGE UP (ref 135–145)
SODIUM SERPL-SCNC: 138 MMOL/L — SIGNIFICANT CHANGE UP (ref 135–145)
SODIUM SERPL-SCNC: 138 MMOL/L — SIGNIFICANT CHANGE UP (ref 135–145)
SODIUM SERPL-SCNC: 141 MMOL/L
SODIUM SERPL-SCNC: 142 MMOL/L — SIGNIFICANT CHANGE UP (ref 135–145)
SODIUM SERPL-SCNC: 143 MMOL/L — SIGNIFICANT CHANGE UP (ref 135–145)
SODIUM SERPL-SCNC: 144 MMOL/L — SIGNIFICANT CHANGE UP (ref 135–145)
SODIUM SERPL-SCNC: 145 MMOL/L — SIGNIFICANT CHANGE UP (ref 135–145)
SP GR SPEC: 1 — LOW (ref 1.01–1.02)
SPECIMEN SOURCE: SIGNIFICANT CHANGE UP
SPECIMEN SOURCE: SIGNIFICANT CHANGE UP
TROPONIN I SERPL-MCNC: 0.02 NG/ML — SIGNIFICANT CHANGE UP (ref 0.02–0.06)
TROPONIN I SERPL-MCNC: <.017 NG/ML — LOW (ref 0.02–0.06)
TROPONIN I SERPL-MCNC: <.017 NG/ML — LOW (ref 0.02–0.06)
TSH SERPL-MCNC: 1.43 UIU/ML — SIGNIFICANT CHANGE UP (ref 0.27–4.2)
UROBILINOGEN FLD QL: NEGATIVE — SIGNIFICANT CHANGE UP
WBC # BLD: 10.87 K/UL — HIGH (ref 3.8–10.5)
WBC # BLD: 6.28 K/UL — SIGNIFICANT CHANGE UP (ref 3.8–10.5)
WBC # BLD: 7.04 K/UL — SIGNIFICANT CHANGE UP (ref 3.8–10.5)
WBC # BLD: 7.24 K/UL — SIGNIFICANT CHANGE UP (ref 3.8–10.5)
WBC # BLD: 7.41 K/UL — SIGNIFICANT CHANGE UP (ref 3.8–10.5)
WBC # BLD: 7.86 K/UL — SIGNIFICANT CHANGE UP (ref 3.8–10.5)
WBC # BLD: 8.71 K/UL — SIGNIFICANT CHANGE UP (ref 3.8–10.5)
WBC # BLD: 9.25 K/UL — SIGNIFICANT CHANGE UP (ref 3.8–10.5)
WBC # FLD AUTO: 10.87 K/UL — HIGH (ref 3.8–10.5)
WBC # FLD AUTO: 6.28 K/UL — SIGNIFICANT CHANGE UP (ref 3.8–10.5)
WBC # FLD AUTO: 7.04 K/UL — SIGNIFICANT CHANGE UP (ref 3.8–10.5)
WBC # FLD AUTO: 7.24 K/UL — SIGNIFICANT CHANGE UP (ref 3.8–10.5)
WBC # FLD AUTO: 7.27 K/UL
WBC # FLD AUTO: 7.41 K/UL — SIGNIFICANT CHANGE UP (ref 3.8–10.5)
WBC # FLD AUTO: 7.86 K/UL — SIGNIFICANT CHANGE UP (ref 3.8–10.5)
WBC # FLD AUTO: 8.71 K/UL — SIGNIFICANT CHANGE UP (ref 3.8–10.5)
WBC # FLD AUTO: 9.25 K/UL — SIGNIFICANT CHANGE UP (ref 3.8–10.5)

## 2020-01-01 PROCEDURE — 74177 CT ABD & PELVIS W/CONTRAST: CPT | Mod: 26

## 2020-01-01 PROCEDURE — 99284 EMERGENCY DEPT VISIT MOD MDM: CPT | Mod: 25

## 2020-01-01 PROCEDURE — 90662 IIV NO PRSV INCREASED AG IM: CPT

## 2020-01-01 PROCEDURE — 85027 COMPLETE CBC AUTOMATED: CPT

## 2020-01-01 PROCEDURE — 36415 COLL VENOUS BLD VENIPUNCTURE: CPT

## 2020-01-01 PROCEDURE — 80048 BASIC METABOLIC PNL TOTAL CA: CPT

## 2020-01-01 PROCEDURE — 93000 ELECTROCARDIOGRAM COMPLETE: CPT

## 2020-01-01 PROCEDURE — 80053 COMPREHEN METABOLIC PANEL: CPT

## 2020-01-01 PROCEDURE — 99235 HOSP IP/OBS SAME DATE MOD 70: CPT

## 2020-01-01 PROCEDURE — G0008: CPT

## 2020-01-01 PROCEDURE — 83880 ASSAY OF NATRIURETIC PEPTIDE: CPT

## 2020-01-01 PROCEDURE — 99239 HOSP IP/OBS DSCHRG MGMT >30: CPT

## 2020-01-01 PROCEDURE — 99214 OFFICE O/P EST MOD 30 MIN: CPT

## 2020-01-01 PROCEDURE — 99223 1ST HOSP IP/OBS HIGH 75: CPT

## 2020-01-01 PROCEDURE — 83735 ASSAY OF MAGNESIUM: CPT

## 2020-01-01 PROCEDURE — 93005 ELECTROCARDIOGRAM TRACING: CPT

## 2020-01-01 PROCEDURE — 97162 PT EVAL MOD COMPLEX 30 MIN: CPT

## 2020-01-01 PROCEDURE — 84484 ASSAY OF TROPONIN QUANT: CPT

## 2020-01-01 PROCEDURE — 71260 CT THORAX DX C+: CPT | Mod: 26

## 2020-01-01 PROCEDURE — 71045 X-RAY EXAM CHEST 1 VIEW: CPT | Mod: 26

## 2020-01-01 PROCEDURE — 99215 OFFICE O/P EST HI 40 MIN: CPT

## 2020-01-01 PROCEDURE — 74177 CT ABD & PELVIS W/CONTRAST: CPT

## 2020-01-01 PROCEDURE — 99285 EMERGENCY DEPT VISIT HI MDM: CPT

## 2020-01-01 PROCEDURE — 71045 X-RAY EXAM CHEST 1 VIEW: CPT

## 2020-01-01 PROCEDURE — 96372 THER/PROPH/DIAG INJ SC/IM: CPT

## 2020-01-01 PROCEDURE — 93010 ELECTROCARDIOGRAM REPORT: CPT

## 2020-01-01 PROCEDURE — 84443 ASSAY THYROID STIM HORMONE: CPT

## 2020-01-01 PROCEDURE — 87040 BLOOD CULTURE FOR BACTERIA: CPT

## 2020-01-01 PROCEDURE — 99285 EMERGENCY DEPT VISIT HI MDM: CPT | Mod: 25

## 2020-01-01 PROCEDURE — U0003: CPT

## 2020-01-01 PROCEDURE — 70450 CT HEAD/BRAIN W/O DYE: CPT | Mod: 26

## 2020-01-01 PROCEDURE — 83605 ASSAY OF LACTIC ACID: CPT

## 2020-01-01 PROCEDURE — 85730 THROMBOPLASTIN TIME PARTIAL: CPT

## 2020-01-01 PROCEDURE — 99232 SBSQ HOSP IP/OBS MODERATE 35: CPT

## 2020-01-01 PROCEDURE — 81001 URINALYSIS AUTO W/SCOPE: CPT

## 2020-01-01 PROCEDURE — 36000 PLACE NEEDLE IN VEIN: CPT

## 2020-01-01 PROCEDURE — 99233 SBSQ HOSP IP/OBS HIGH 50: CPT

## 2020-01-01 PROCEDURE — 85610 PROTHROMBIN TIME: CPT

## 2020-01-01 PROCEDURE — 70450 CT HEAD/BRAIN W/O DYE: CPT

## 2020-01-01 PROCEDURE — 86769 SARS-COV-2 COVID-19 ANTIBODY: CPT

## 2020-01-01 PROCEDURE — 96360 HYDRATION IV INFUSION INIT: CPT

## 2020-01-01 PROCEDURE — 71260 CT THORAX DX C+: CPT

## 2020-01-01 PROCEDURE — 82962 GLUCOSE BLOOD TEST: CPT

## 2020-01-01 RX ORDER — SENNA PLUS 8.6 MG/1
2 TABLET ORAL AT BEDTIME
Refills: 0 | Status: DISCONTINUED | OUTPATIENT
Start: 2020-01-01 | End: 2020-01-01

## 2020-01-01 RX ORDER — RISEDRONATE SODIUM 25.8; 4.2 MG/1; MG/1
1 TABLET, FILM COATED ORAL
Qty: 0 | Refills: 0 | DISCHARGE

## 2020-01-01 RX ORDER — MEMANTINE HYDROCHLORIDE 10 MG/1
0 TABLET ORAL
Qty: 0 | Refills: 0 | DISCHARGE

## 2020-01-01 RX ORDER — DONEPEZIL HYDROCHLORIDE 10 MG/1
5 TABLET, FILM COATED ORAL AT BEDTIME
Refills: 0 | Status: DISCONTINUED | OUTPATIENT
Start: 2020-01-01 | End: 2020-01-01

## 2020-01-01 RX ORDER — AMPICILLIN SODIUM AND SULBACTAM SODIUM 250; 125 MG/ML; MG/ML
3 INJECTION, POWDER, FOR SUSPENSION INTRAMUSCULAR; INTRAVENOUS ONCE
Refills: 0 | Status: COMPLETED | OUTPATIENT
Start: 2020-01-01 | End: 2020-01-01

## 2020-01-01 RX ORDER — POTASSIUM CHLORIDE 20 MEQ
10 PACKET (EA) ORAL ONCE
Refills: 0 | Status: COMPLETED | OUTPATIENT
Start: 2020-01-01 | End: 2020-01-01

## 2020-01-01 RX ORDER — LANOLIN ALCOHOL/MO/W.PET/CERES
5 CREAM (GRAM) TOPICAL ONCE
Refills: 0 | Status: DISCONTINUED | OUTPATIENT
Start: 2020-01-01 | End: 2020-01-01

## 2020-01-01 RX ORDER — SODIUM CHLORIDE 9 MG/ML
1000 INJECTION INTRAMUSCULAR; INTRAVENOUS; SUBCUTANEOUS
Refills: 0 | Status: DISCONTINUED | OUTPATIENT
Start: 2020-01-01 | End: 2020-01-01

## 2020-01-01 RX ORDER — SODIUM CHLORIDE 9 MG/ML
1000 INJECTION, SOLUTION INTRAVENOUS
Refills: 0 | Status: DISCONTINUED | OUTPATIENT
Start: 2020-01-01 | End: 2020-01-01

## 2020-01-01 RX ORDER — CEFTRIAXONE 500 MG/1
1000 INJECTION, POWDER, FOR SOLUTION INTRAMUSCULAR; INTRAVENOUS EVERY 24 HOURS
Refills: 0 | Status: DISCONTINUED | OUTPATIENT
Start: 2020-01-01 | End: 2020-01-01

## 2020-01-01 RX ORDER — LEVOTHYROXINE SODIUM 125 MCG
1 TABLET ORAL
Qty: 0 | Refills: 0 | DISCHARGE

## 2020-01-01 RX ORDER — POTASSIUM CHLORIDE 20 MEQ
40 PACKET (EA) ORAL ONCE
Refills: 0 | Status: COMPLETED | OUTPATIENT
Start: 2020-01-01 | End: 2020-01-01

## 2020-01-01 RX ORDER — MORPHINE SULFATE 50 MG/1
2 CAPSULE, EXTENDED RELEASE ORAL ONCE
Refills: 0 | Status: DISCONTINUED | OUTPATIENT
Start: 2020-01-01 | End: 2020-01-01

## 2020-01-01 RX ORDER — LEVOTHYROXINE SODIUM 125 MCG
75 TABLET ORAL DAILY
Refills: 0 | Status: DISCONTINUED | OUTPATIENT
Start: 2020-01-01 | End: 2020-01-01

## 2020-01-01 RX ORDER — POTASSIUM CHLORIDE 20 MEQ
1 PACKET (EA) ORAL
Qty: 30 | Refills: 0
Start: 2020-01-01 | End: 2020-01-01

## 2020-01-01 RX ORDER — PANTOPRAZOLE SODIUM 20 MG/1
40 TABLET, DELAYED RELEASE ORAL
Refills: 0 | Status: DISCONTINUED | OUTPATIENT
Start: 2020-01-01 | End: 2020-01-01

## 2020-01-01 RX ORDER — DONEPEZIL HYDROCHLORIDE 10 MG/1
1 TABLET, FILM COATED ORAL
Qty: 0 | Refills: 0 | DISCHARGE

## 2020-01-01 RX ORDER — POTASSIUM CHLORIDE 20 MEQ
40 PACKET (EA) ORAL EVERY 4 HOURS
Refills: 0 | Status: DISCONTINUED | OUTPATIENT
Start: 2020-01-01 | End: 2020-01-01

## 2020-01-01 RX ORDER — POTASSIUM CHLORIDE 20 MEQ
10 PACKET (EA) ORAL
Refills: 0 | Status: DISCONTINUED | OUTPATIENT
Start: 2020-01-01 | End: 2020-01-01

## 2020-01-01 RX ORDER — SODIUM CHLORIDE 9 MG/ML
500 INJECTION INTRAMUSCULAR; INTRAVENOUS; SUBCUTANEOUS
Refills: 0 | Status: DISCONTINUED | OUTPATIENT
Start: 2020-01-01 | End: 2020-01-01

## 2020-01-01 RX ORDER — OLANZAPINE 15 MG/1
2.5 TABLET, FILM COATED ORAL ONCE
Refills: 0 | Status: COMPLETED | OUTPATIENT
Start: 2020-01-01 | End: 2020-01-01

## 2020-01-01 RX ORDER — DONEPEZIL HYDROCHLORIDE 5 MG/1
5 TABLET ORAL
Qty: 90 | Refills: 1 | Status: DISCONTINUED | COMMUNITY
Start: 2017-06-20 | End: 2020-01-01

## 2020-01-01 RX ORDER — MEMANTINE HYDROCHLORIDE 10 MG/1
1 TABLET ORAL
Qty: 0 | Refills: 0 | DISCHARGE

## 2020-01-01 RX ORDER — LANOLIN ALCOHOL/MO/W.PET/CERES
1 CREAM (GRAM) TOPICAL
Qty: 0 | Refills: 0 | DISCHARGE

## 2020-01-01 RX ORDER — HALOPERIDOL DECANOATE 100 MG/ML
2 INJECTION INTRAMUSCULAR ONCE
Refills: 0 | Status: COMPLETED | OUTPATIENT
Start: 2020-01-01 | End: 2020-01-01

## 2020-01-01 RX ORDER — SODIUM CHLORIDE 9 MG/ML
1700 INJECTION INTRAMUSCULAR; INTRAVENOUS; SUBCUTANEOUS ONCE
Refills: 0 | Status: COMPLETED | OUTPATIENT
Start: 2020-01-01 | End: 2020-01-01

## 2020-01-01 RX ORDER — RISEDRONATE SODIUM 25.8; 4.2 MG/1; MG/1
0 TABLET, FILM COATED ORAL
Qty: 0 | Refills: 0 | DISCHARGE

## 2020-01-01 RX ORDER — DONEPEZIL HYDROCHLORIDE 10 MG/1
0 TABLET, FILM COATED ORAL
Qty: 0 | Refills: 0 | DISCHARGE

## 2020-01-01 RX ORDER — METRONIDAZOLE 500 MG
500 TABLET ORAL EVERY 8 HOURS
Refills: 0 | Status: DISCONTINUED | OUTPATIENT
Start: 2020-01-01 | End: 2020-01-01

## 2020-01-01 RX ORDER — ENOXAPARIN SODIUM 100 MG/ML
40 INJECTION SUBCUTANEOUS DAILY
Refills: 0 | Status: DISCONTINUED | OUTPATIENT
Start: 2020-01-01 | End: 2020-01-01

## 2020-01-01 RX ORDER — POLYETHYLENE GLYCOL 3350 17 G/17G
17 POWDER, FOR SOLUTION ORAL ONCE
Refills: 0 | Status: DISCONTINUED | OUTPATIENT
Start: 2020-01-01 | End: 2020-01-01

## 2020-01-01 RX ORDER — MEMANTINE HYDROCHLORIDE 10 MG/1
10 TABLET ORAL
Refills: 0 | Status: DISCONTINUED | OUTPATIENT
Start: 2020-01-01 | End: 2020-01-01

## 2020-01-01 RX ORDER — DIPHENHYDRAMINE HCL 50 MG
25 CAPSULE ORAL ONCE
Refills: 0 | Status: COMPLETED | OUTPATIENT
Start: 2020-01-01 | End: 2020-01-01

## 2020-01-01 RX ORDER — ACETAMINOPHEN 500 MG
650 TABLET ORAL EVERY 6 HOURS
Refills: 0 | Status: DISCONTINUED | OUTPATIENT
Start: 2020-01-01 | End: 2020-01-01

## 2020-01-01 RX ORDER — POTASSIUM CHLORIDE 20 MEQ
1 PACKET (EA) ORAL
Qty: 0 | Refills: 0 | DISCHARGE
Start: 2020-01-01

## 2020-01-01 RX ORDER — POTASSIUM CHLORIDE 20 MEQ
20 PACKET (EA) ORAL DAILY
Refills: 0 | Status: DISCONTINUED | OUTPATIENT
Start: 2020-01-01 | End: 2020-01-01

## 2020-01-01 RX ORDER — ALPRAZOLAM 0.25 MG
0.25 TABLET ORAL ONCE
Refills: 0 | Status: DISCONTINUED | OUTPATIENT
Start: 2020-01-01 | End: 2020-01-01

## 2020-01-01 RX ORDER — LANOLIN ALCOHOL/MO/W.PET/CERES
3 CREAM (GRAM) TOPICAL AT BEDTIME
Refills: 0 | Status: DISCONTINUED | OUTPATIENT
Start: 2020-01-01 | End: 2020-01-01

## 2020-01-01 RX ORDER — DONEPEZIL HYDROCHLORIDE 10 MG/1
1 TABLET, FILM COATED ORAL
Qty: 30 | Refills: 0
Start: 2020-01-01 | End: 2020-01-01

## 2020-01-01 RX ORDER — POTASSIUM CHLORIDE 20 MEQ
10 PACKET (EA) ORAL
Refills: 0 | Status: COMPLETED | OUTPATIENT
Start: 2020-01-01 | End: 2020-01-01

## 2020-01-01 RX ORDER — LEVOTHYROXINE SODIUM 125 MCG
0 TABLET ORAL
Qty: 0 | Refills: 0 | DISCHARGE

## 2020-01-01 RX ORDER — QUETIAPINE FUMARATE 25 MG/1
25 TABLET ORAL
Qty: 90 | Refills: 3 | Status: DISCONTINUED | COMMUNITY
Start: 2020-01-01 | End: 2020-01-01

## 2020-01-01 RX ADMIN — MEMANTINE HYDROCHLORIDE 10 MILLIGRAM(S): 10 TABLET ORAL at 05:38

## 2020-01-01 RX ADMIN — SODIUM CHLORIDE 100 MILLILITER(S): 9 INJECTION INTRAMUSCULAR; INTRAVENOUS; SUBCUTANEOUS at 12:04

## 2020-01-01 RX ADMIN — ENOXAPARIN SODIUM 40 MILLIGRAM(S): 100 INJECTION SUBCUTANEOUS at 12:05

## 2020-01-01 RX ADMIN — Medication 100 MILLIEQUIVALENT(S): at 12:04

## 2020-01-01 RX ADMIN — DONEPEZIL HYDROCHLORIDE 5 MILLIGRAM(S): 10 TABLET, FILM COATED ORAL at 00:15

## 2020-01-01 RX ADMIN — Medication 10 MILLIEQUIVALENT(S): at 12:32

## 2020-01-01 RX ADMIN — MEMANTINE HYDROCHLORIDE 10 MILLIGRAM(S): 10 TABLET ORAL at 17:53

## 2020-01-01 RX ADMIN — Medication 25 MILLIGRAM(S): at 18:30

## 2020-01-01 RX ADMIN — Medication 3 MILLIGRAM(S): at 17:19

## 2020-01-01 RX ADMIN — HALOPERIDOL DECANOATE 2 MILLIGRAM(S): 100 INJECTION INTRAMUSCULAR at 00:44

## 2020-01-01 RX ADMIN — Medication 100 MILLIEQUIVALENT(S): at 09:01

## 2020-01-01 RX ADMIN — PANTOPRAZOLE SODIUM 40 MILLIGRAM(S): 20 TABLET, DELAYED RELEASE ORAL at 05:37

## 2020-01-01 RX ADMIN — Medication 75 MICROGRAM(S): at 05:43

## 2020-01-01 RX ADMIN — Medication 75 MICROGRAM(S): at 06:18

## 2020-01-01 RX ADMIN — SODIUM CHLORIDE 75 MILLILITER(S): 9 INJECTION INTRAMUSCULAR; INTRAVENOUS; SUBCUTANEOUS at 11:57

## 2020-01-01 RX ADMIN — MEMANTINE HYDROCHLORIDE 10 MILLIGRAM(S): 10 TABLET ORAL at 06:05

## 2020-01-01 RX ADMIN — SODIUM CHLORIDE 1700 MILLILITER(S): 9 INJECTION INTRAMUSCULAR; INTRAVENOUS; SUBCUTANEOUS at 17:18

## 2020-01-01 RX ADMIN — MEMANTINE HYDROCHLORIDE 10 MILLIGRAM(S): 10 TABLET ORAL at 06:18

## 2020-01-01 RX ADMIN — Medication 0.25 MILLIGRAM(S): at 16:49

## 2020-01-01 RX ADMIN — Medication 100 MILLIGRAM(S): at 05:38

## 2020-01-01 RX ADMIN — ENOXAPARIN SODIUM 40 MILLIGRAM(S): 100 INJECTION SUBCUTANEOUS at 11:15

## 2020-01-01 RX ADMIN — ENOXAPARIN SODIUM 40 MILLIGRAM(S): 100 INJECTION SUBCUTANEOUS at 12:32

## 2020-01-01 RX ADMIN — SODIUM CHLORIDE 60 MILLILITER(S): 9 INJECTION INTRAMUSCULAR; INTRAVENOUS; SUBCUTANEOUS at 00:15

## 2020-01-01 RX ADMIN — MORPHINE SULFATE 2 MILLIGRAM(S): 50 CAPSULE, EXTENDED RELEASE ORAL at 21:33

## 2020-01-01 RX ADMIN — SODIUM CHLORIDE 1700 MILLILITER(S): 9 INJECTION INTRAMUSCULAR; INTRAVENOUS; SUBCUTANEOUS at 15:50

## 2020-01-01 RX ADMIN — AMPICILLIN SODIUM AND SULBACTAM SODIUM 200 GRAM(S): 250; 125 INJECTION, POWDER, FOR SUSPENSION INTRAMUSCULAR; INTRAVENOUS at 21:47

## 2020-01-01 RX ADMIN — Medication 75 MICROGRAM(S): at 05:38

## 2020-01-01 RX ADMIN — ENOXAPARIN SODIUM 40 MILLIGRAM(S): 100 INJECTION SUBCUTANEOUS at 13:12

## 2020-01-01 RX ADMIN — Medication 100 MILLIEQUIVALENT(S): at 13:12

## 2020-01-01 RX ADMIN — MEMANTINE HYDROCHLORIDE 10 MILLIGRAM(S): 10 TABLET ORAL at 05:46

## 2020-01-01 RX ADMIN — Medication 40 MILLIEQUIVALENT(S): at 16:49

## 2020-01-01 RX ADMIN — SODIUM CHLORIDE 50 MILLILITER(S): 9 INJECTION, SOLUTION INTRAVENOUS at 17:48

## 2020-01-01 RX ADMIN — Medication 1 ENEMA: at 09:42

## 2020-01-01 RX ADMIN — PANTOPRAZOLE SODIUM 40 MILLIGRAM(S): 20 TABLET, DELAYED RELEASE ORAL at 06:05

## 2020-01-01 RX ADMIN — Medication 100 MILLIEQUIVALENT(S): at 09:13

## 2020-01-01 RX ADMIN — Medication 40 MILLIEQUIVALENT(S): at 15:30

## 2020-01-01 RX ADMIN — MEMANTINE HYDROCHLORIDE 10 MILLIGRAM(S): 10 TABLET ORAL at 05:44

## 2020-01-01 RX ADMIN — Medication 20 MILLIEQUIVALENT(S): at 11:11

## 2020-01-01 RX ADMIN — Medication 75 MICROGRAM(S): at 05:46

## 2020-01-01 RX ADMIN — DONEPEZIL HYDROCHLORIDE 5 MILLIGRAM(S): 10 TABLET, FILM COATED ORAL at 21:38

## 2020-01-01 RX ADMIN — Medication 100 MILLIGRAM(S): at 00:16

## 2020-01-01 RX ADMIN — PANTOPRAZOLE SODIUM 40 MILLIGRAM(S): 20 TABLET, DELAYED RELEASE ORAL at 06:18

## 2020-01-01 RX ADMIN — Medication 100 MILLIEQUIVALENT(S): at 18:25

## 2020-01-01 RX ADMIN — DONEPEZIL HYDROCHLORIDE 5 MILLIGRAM(S): 10 TABLET, FILM COATED ORAL at 21:02

## 2020-01-01 RX ADMIN — MEMANTINE HYDROCHLORIDE 10 MILLIGRAM(S): 10 TABLET ORAL at 19:25

## 2020-01-01 RX ADMIN — SODIUM CHLORIDE 50 MILLILITER(S): 9 INJECTION, SOLUTION INTRAVENOUS at 00:07

## 2020-01-01 RX ADMIN — ENOXAPARIN SODIUM 40 MILLIGRAM(S): 100 INJECTION SUBCUTANEOUS at 12:42

## 2020-01-01 RX ADMIN — HALOPERIDOL DECANOATE 2 MILLIGRAM(S): 100 INJECTION INTRAMUSCULAR at 22:00

## 2020-01-01 RX ADMIN — PANTOPRAZOLE SODIUM 40 MILLIGRAM(S): 20 TABLET, DELAYED RELEASE ORAL at 07:06

## 2020-01-01 RX ADMIN — Medication 20 MILLIEQUIVALENT(S): at 12:32

## 2020-01-01 RX ADMIN — Medication 75 MICROGRAM(S): at 06:05

## 2020-01-01 RX ADMIN — DONEPEZIL HYDROCHLORIDE 5 MILLIGRAM(S): 10 TABLET, FILM COATED ORAL at 23:17

## 2020-01-01 RX ADMIN — Medication 3 MILLIGRAM(S): at 21:02

## 2020-01-01 RX ADMIN — MEMANTINE HYDROCHLORIDE 10 MILLIGRAM(S): 10 TABLET ORAL at 23:17

## 2020-01-01 RX ADMIN — Medication 40 MILLIEQUIVALENT(S): at 09:16

## 2020-01-01 RX ADMIN — Medication 100 MILLIEQUIVALENT(S): at 14:12

## 2020-01-01 RX ADMIN — OLANZAPINE 2.5 MILLIGRAM(S): 15 TABLET, FILM COATED ORAL at 18:50

## 2020-01-01 RX ADMIN — Medication 100 MILLIEQUIVALENT(S): at 11:57

## 2020-03-03 NOTE — REVIEW OF SYSTEMS
[Fever] : no fever [Chills] : no chills [Fatigue] : no fatigue [Hot Flashes] : no hot flashes [Recent Change In Weight] : ~T no recent weight change [Night Sweats] : no night sweats [Discharge] : no discharge [Pain] : no pain [Vision Problems] : no vision problems [Redness] : no redness [Dryness] : no dryness  [Earache] : no earache [Itching] : no itching [Hoarseness] : no hoarseness [Nosebleed] : no nosebleeds [Hearing Loss] : no hearing loss [Sore Throat] : no sore throat [Nasal Discharge] : no nasal discharge [Chest Pain] : no chest pain [Postnasal Drip] : no postnasal drip [Palpitations] : no palpitations [Leg Claudication] : no leg claudication [Paroxysmal Nocturnal Dyspnea] : no paroxysmal nocturnal dyspnea [Orthopnea] : no orthopnea [Lower Ext Edema] : no lower extremity edema [Shortness Of Breath] : no shortness of breath [Wheezing] : no wheezing [Dyspnea on Exertion] : no dyspnea on exertion [Cough] : no cough [Abdominal Pain] : no abdominal pain [Nausea] : no nausea [Diarrhea] : diarrhea [Constipation] : no constipation [Heartburn] : no heartburn [Vomiting] : no vomiting [Dysuria] : no dysuria [Incontinence] : no incontinence [Melena] : no melena [Hematuria] : no hematuria [Nocturia] : nocturia [Poor Libido] : libido not poor [Vaginal Discharge] : no vaginal discharge [Frequency] : no frequency [Dysmenorrhea] : no dysmenorrhea [Joint Pain] : joint pain [Joint Stiffness] : joint stiffness [Joint Swelling] : no joint swelling [Muscle Weakness] : no muscle weakness [Muscle Pain] : no muscle pain [Back Pain] : back pain [Mole Changes] : no mole changes [Hair Changes] : no hair changes [Nail Changes] : no nail changes [Headache] : no headache [Skin Rash] : no skin rash [Dizziness] : no dizziness [Confusion] : confusion [Fainting] : no fainting [Suicidal] : not suicidal [Memory Loss] : memory loss [Unsteady Walking] : ataxia [Insomnia] : no insomnia [Anxiety] : no anxiety [Easy Bleeding] : no easy bleeding [Depression] : no depression [Swollen Glands] : no swollen glands [Easy Bruising] : no easy bruising [Negative] : Heme/Lymph [FreeTextEntry9] : knee

## 2020-03-03 NOTE — PHYSICAL EXAM
[Well Nourished] : well nourished [Well Developed] : well developed [No Acute Distress] : no acute distress [Well-Appearing] : well-appearing [Normal Voice/Communication] : normal voice/communication [Normal Sclera/Conjunctiva] : normal sclera/conjunctiva [EOMI] : extraocular movements intact [PERRL] : pupils equal round and reactive to light [Normal TMs] : both tympanic membranes were normal [Normal Oropharynx] : the oropharynx was normal [Normal Outer Ear/Nose] : the outer ears and nose were normal in appearance [No Lymphadenopathy] : no lymphadenopathy [No JVD] : no jugular venous distention [Normal Nasal Mucosa] : the nasal mucosa was normal [Thyroid Normal, No Nodules] : the thyroid was normal and there were no nodules present [Supple] : supple [No Respiratory Distress] : no respiratory distress  [Normal Rate] : normal rate  [Clear to Auscultation] : lungs were clear to auscultation bilaterally [No Accessory Muscle Use] : no accessory muscle use [No Murmur] : no murmur heard [Normal S1, S2] : normal S1 and S2 [Regular Rhythm] : with a regular rhythm [No Carotid Bruits] : no carotid bruits [No Abdominal Bruit] : a ~M bruit was not heard ~T in the abdomen [Pedal Pulses Present] : the pedal pulses are present [No Varicosities] : no varicosities [No Edema] : there was no peripheral edema [No Palpable Aorta] : no palpable aorta [Declined Breast Exam] : declined breast exam  [No Extremity Clubbing/Cyanosis] : no extremity clubbing/cyanosis [Non Tender] : non-tender [Soft] : abdomen soft [No Masses] : no abdominal mass palpated [No HSM] : no HSM [Non-distended] : non-distended [Declined Rectal Exam] : declined rectal exam [No Hernias] : no hernias [Normal Bowel Sounds] : normal bowel sounds [Normal Posterior Cervical Nodes] : no posterior cervical lymphadenopathy [Normal Supraclavicular Nodes] : no supraclavicular lymphadenopathy [Normal Axillary Nodes] : no axillary lymphadenopathy [Normal Anterior Cervical Nodes] : no anterior cervical lymphadenopathy [Normal Inguinal Nodes] : no inguinal lymphadenopathy [No CVA Tenderness] : no CVA  tenderness [Normal Femoral Nodes] : no femoral lymphadenopathy [No Spinal Tenderness] : no spinal tenderness [Kyphosis] : no kyphosis [Scoliosis] : no scoliosis [Grossly Normal Strength/Tone] : grossly normal strength/tone [No Joint Swelling] : no joint swelling [No Rash] : no rash [No Skin Lesions] : no skin lesions [Acne] : no acne [No Focal Deficits] : no focal deficits [Coordination Grossly Intact] : coordination grossly intact [Normal Gait] : normal gait [Deep Tendon Reflexes (DTR)] : deep tendon reflexes were 2+ and symmetric [Normal Affect] : the affect was normal [Speech Grossly Normal] : speech grossly normal [de-identified] : alert and oriented times 2 [Normal Insight/Judgement] : insight and judgment were intact [Normal Mood] : the mood was normal

## 2020-03-03 NOTE — HISTORY OF PRESENT ILLNESS
[de-identified] : 88-year-old woman comes to the office for followup accompanied by her  and son with a history of a lung mass and advanced dementia colon cancer ulcerative colitis hyperlipidemia hypothyroidism left bundle branch block and osteopenia patient's memory has clearly deteriorated she denies temperature chills sweats or myalgias she has had no headaches sinus congestion sore throat cough wheezing pleurisy chest pain shortness of breath exertional dyspnea lightheadedness palpitations dizziness vertigo or syncope she denies abdominal pain diarrhea bright red blood per rectum nausea vomiting she has had no leg edema and does not complain of severe musculoskeletal complaints she gets up several times at night as per her  to urinate but doesn't seem to complain of dysuria or gross hematuria [FreeTextEntry1] : Comes to the office for followup to review her medications and discuss her overall health chief complaints his unsteady gait arthritis

## 2020-03-03 NOTE — ASSESSMENT
[FreeTextEntry1] : Physical exam she is an elderly well and in no acute distress blood pressure 124/64 height 5 feet 1 inch weight 130 pounds heart rate of 80 respiratory rate of 15 HEENT was unremarkable ear cardiovascular exam showed a normal S1 with no extra heart sounds murmurs abdomen was soft and nontender extremities show no clubbing cyanosis or edema neurologic exam was nonfocal she was alert and oriented times one to person only blood tests were performed in June of 2019 is up-to-date with her ophthalmologist defers on gynecologic appointments for colorectal cancer screening she is scheduled to have a CAT scan of her chest abdomen and pelvis in the months ahead by her oncologist who is following her lung mass nonsurgical care and nonaggressive care is warranted.She is up to date on her influenza vaccine Prevnar 13 vaccine in both doses of the shingles vaccine

## 2020-03-03 NOTE — HEALTH RISK ASSESSMENT
[] : No [No] : No [One fall no injury in past year] : Patient reported one fall in the past year without injury [1] : 1) Little interest or pleasure doing things for several days (1) [0] : 2) Feeling down, depressed, or hopeless: Not at all (0) [[id number]] : 1

## 2020-05-16 NOTE — ED ADULT TRIAGE NOTE - CHIEF COMPLAINT QUOTE
Pt BIB EMS from home c/o SOB. Pt in no apparent distress in triage, airway patent, respirations nonlabored/spontaneous, O2sat 98%.

## 2020-05-16 NOTE — ED PROVIDER NOTE - PROGRESS NOTE DETAILS
case s/o  panchito check labs xcr if wnl pt can be discharged Dr. Hernandez: Recd sign out from Dr. Toure, pt resting comfortably, will chk 2nd trop and EKG at 9am.

## 2020-05-16 NOTE — ED PROVIDER NOTE - CARE PROVIDER_API CALL
Estiven Stevens  CARDIOLOGY  [address]  [place] [address]  Phone: [phone]  Fax: [phone]  Follow Up Time:

## 2020-05-16 NOTE — ED PROVIDER NOTE - PATIENT PORTAL LINK FT
You can access the FollowMyHealth Patient Portal offered by [facility] by registering at the following website: [email or web address]. By joining [facility]’s FollowMyHealth portal, you will also be able to view your health information using other applications (apps) compatible with our system.

## 2020-05-16 NOTE — ED PROVIDER NOTE - NSFOLLOWUPINSTRUCTIONS_ED_ALL_ED_FT
1. Follow up with your PMD within 48-72 hours.   2. Recommend follow up with a Cardiologist. Show copies of your reports given to you.  3. Recommend Aspirin 81mg over the counter daily until further evaluation.  Take all of your other medications as previously prescribed.  4. Return to the ED for worsening chest pain, shortness of breath, dizziness or any concerns    Chest Pain    Chest pain can be caused by many different conditions which may or may not be dangerous. Causes include heartburn, lung infections, heart attack, blood clot in lungs, skin infections, strain or damage to muscle, cartilage, or bones, etc. In addition to a history and physical examination, an electrocardiogram (ECG) or other lab tests may have been performed to determine the cause of your chest pain. Follow up with your primary care provider or with a cardiologist as instructed.     SEEK IMMEDIATE MEDICAL CARE IF YOU HAVE ANY OF THE FOLLOWING SYMPTOMS: worsening chest pain, coughing up blood, unexplained back/neck/jaw pain, severe abdominal pain, dizziness or lightheadedness, fainting, shortness of breath, sweaty or clammy skin, vomiting, or racing heart beat. These symptoms may represent a serious problem that is an emergency. Do not wait to see if the symptoms will go away. Get medical help right away. Call 911 and do not drive yourself to the hospital. 1. Follow up with your PMD within 48-72 hours.   2. Recommend follow up with a Cardiologist. Show copies of your reports given to you.  3. Recommend Aspirin 81mg over the counter daily until further evaluation.  Take all of your other medications as previously prescribed.  4. Return to the ED for worsening chest pain, shortness of breath, dizziness or any concerns    Chest Pain    Chest pain can be caused by many different conditions which may or may not be dangerous. Causes include heartburn, lung infections, heart attack, blood clot in lungs, skin infections, strain or damage to muscle, cartilage, or bones, etc. In addition to a history and physical examination, an electrocardiogram (ECG) or other lab tests may have been performed to determine the cause of your chest pain. Follow up with your primary care provider or with a cardiologist as instructed.     SEEK IMMEDIATE MEDICAL CARE IF YOU HAVE ANY OF THE FOLLOWING SYMPTOMS: worsening chest pain, coughing up blood, unexplained back/neck/jaw pain, severe abdominal pain, dizziness or lightheadedness, fainting, shortness of breath, sweaty or clammy skin, vomiting, or racing heart beat. These symptoms may represent a serious problem that is an emergency. Do not wait to see if the symptoms will go away. Get medical help right away. Call 911 and do not drive yourself to the hospital.  ***  SEE ATTACHED CORONAVIRUS DISCHARGE INSTRUCTIONS    -Take Tylenol for fever not Motrin/Aleve/Advil  -Use a surgical mask at all times and quarantine yourself from everyone else for 14 days since symptoms started  -The COVID swab results come back in 5-7 days  -Speak to your doctor in 1-2 days to follow up  -Return to the ED if you are struggling to breathe  -There is no specific treatment for COVID  ****  COVID-19  COVID-19, also known as coronavirus disease or novel coronavirus, is caused by a type of virus that causes respiratory illness. This may lead to inflammation and the buildup of mucus and fluids in the airway of the lungs (pneumonia). There are many different coronaviruses. Most of these viruses only affect animals, but sometimes these viruses can change and infect people.  What are the causes?  This illness is caused by a virus. You may catch the virus by:  Breathing in droplets from an infected person's cough or sneeze.Touching something, like a table or a doorknob, that was exposed to the virus (contaminated) and then touching your mouth, nose, or eyes.Being around animals that carry the virus, or eating uncooked or undercooked meat or animal products that contain the virus.What increases the risk?  You are more likely to develop this condition if you:  Live in or travel to an area with a COVID-19 outbreak.Come in contact with a sick person who recently traveled to an area with a COVID-19 outbreak.Provide care for or live with a person who is infected with COVID-19.What are the signs or symptoms?  COVID-19 causes respiratory illness that can lead to pneumonia. Symptoms of pneumonia may include:  A fever.A cough.Difficulty breathing.How is this diagnosed?  This condition may be diagnosed based on:  Your signs and symptoms, especially if:  You live in an area with a COVID-19 outbreak.You recently traveled to or from an area where the virus is common.You provide care for or live with a person who was diagnosed with COVID-19.A physical exam.Lab tests, which may include:  A nasal swab to take a sample of fluid from your nose.A throat swab to take a sample of fluid from your throat.A sample of mucus from your lungs (sputum).Blood tests.How is this treated?  There is no medicine to treat COVID-19. Your health care provider will talk with you about ways to treat your symptoms. This may include rest, fluids, and over-the-counter medicines.  Follow these instructions at home:  Lifestyle     Use a cool-mist humidifier to add moisture to the air. This can help you breathe more easily.Do not use any products that contain nicotine or tobacco, such as cigarettes, e-cigarettes, and chewing tobacco. If you need help quitting, ask your health care provider.Rest at home as told by your health care provider.Return to your normal activities as told by your health care provider. Ask your health care provider what activities are safe for you.General instructions     Take over-the-counter and prescription medicines only as told by your health care provider.Drink enough fluid to keep your urine pale yellow.Keep all follow-up visits as told by your health care provider. This is important.How is this prevented?     There is no vaccine to help prevent COVID-19 infection. However, there are steps you can take to protect yourself and others from this virus.  To protect yourself:     Do not travel to areas where COVID-19 is a risk. The areas where COVID-19 is reported change often. To identify high-risk areas, check the CDC travel website: wwwnc.cdc.gov/travel/noticesIf you live in, or must travel to, an area where COVID-19 is a risk, take precautions to avoid infection.  Stay away from people who are sick.Stay away from places where there are animals that may carry the virus. This includes places where animals and animal products are sold. Note that both living and dead animals can carry the virus.Wash your hands often with soap and water. If soap and water are not available, use an alcohol-based hand .Avoid touching your mouth, face, eyes, or nose.To protect others:     If you have symptoms, take steps to prevent the virus from spreading to others.  If you think you have a COVID-19 infection, contact your health care provider right away. Tell your health care team that you think you may have a COVID-19 infection.Stay home. Leave your house only to seek medical care.Do not travel while you are sick.Wash your hands often with soap and water. If soap and water are not available, use alcohol-based hand .Stay away from other members of your household. If possible, stay in your own room, separate from others. Use a different bathroom.Make sure that all people in your household wash their hands well and often.Cough or sneeze into a tissue or your sleeve or elbow. Do not cough or sneeze into your hand or into the air.Wear a face mask.Where to find more information  Centers for Disease Control and Prevention: www.cdc.gov/coronavirus/2019-ncov/index.htmlWor Health Organization: www.who.int/health-topics/coronavirusContact a health care provider if:  You have traveled to an area where COVID-19 is a risk and you have symptoms of the infection.You have contact with someone who has traveled to an area where COVID-19 is a risk and you have symptoms of the infection.Get help right away if:  You have trouble breathing.You have chest pain.Summary  COVID-19 is caused by a type of virus that causes respiratory illness. This may lead to inflammation and the buildup of mucus and fluids in the airway of the lungs (pneumonia).You are more likely to develop this condition if you live in or travel to an area with a COVID-19 outbreak.There is no medicine to treat COVID-19. Your health care provider will talk with you about ways to treat your symptoms.Take steps to protect yourself and others from infection. Wash your hands often. Stay away from other people who are sick and wear a mask if you are sick.This information is not intended to replace advice given to you by your health care provider. Make sure you discuss any questions you have with your health care provider.

## 2020-05-16 NOTE — ED ADULT NURSE NOTE - OBJECTIVE STATEMENT
88 yr old female BIB EMS from home c/o sudden onset of SOB; pt asked  to call ambulance; pt denies any complaints at this time; vitals stable; hx dementia; poor historian

## 2020-05-16 NOTE — ED PROVIDER NOTE - CARE PLAN
Principal Discharge DX:	Shortness of breath Principal Discharge DX:	Shortness of breath  Secondary Diagnosis:	Suspected 2019 novel coronavirus infection

## 2020-05-16 NOTE — ED ADULT NURSE NOTE - CHPI ED NUR SYMPTOMS NEG
no fever/no chest pain/no cough/no edema/no headache/no hemoptysis/no body aches/no chills/no diaphoresis/no wheezing

## 2020-05-18 PROBLEM — F03.90 UNSPECIFIED DEMENTIA WITHOUT BEHAVIORAL DISTURBANCE: Chronic | Status: ACTIVE | Noted: 2020-01-01

## 2020-06-01 NOTE — ASSESSMENT
[FreeTextEntry1] : Patient with history of lung neoplasm and colon cancer-reviewed CT scan results with son in detail, as well as lab work. I am concerned regarding CEA continuing to rise and focal thickening of the transverse colon noted on CAT scan-rule out colon neoplastic disease. Increased lymph node in chest also discussed-cannot rule out progressive lung neoplastic disease as well.\par If family wishes to pursue further evaluation, then colonoscopy would it be indicated to start. \par It would also be reasonable to proceed with best supportive care (and no further intervention at this point), in light of patient's advanced age, progressive dementia and with COVID concerns.\par Son wishes to confer with PCP/GI  MD, Dr. Dubose prior to making further management decisions.\par Son was given the opportunity to ask questions. His questions have been answered to his apparent satisfaction.

## 2020-06-01 NOTE — HISTORY OF PRESENT ILLNESS
[de-identified] : 6/2017-Patient had been having nausea with associated weight loss x approx. 6 weeks. Fell with transient LOC/seizure activity. She was admitted to the hospital and was found to be hypercalcemic and dehydrated. Additional evaluation included a CT Angiogram of the chest which compared to 5/2015 showed an enlarging nodule in the left upper lobe (17 mm), suspicious for bronchogenic carcinoma. No acute pulmonary emboli demonstrated. Followup PET/CT scan showed hypermetabolic mixed solid and ground glass left upper lobe pulmonary nodule (2 cm), increased since 5/2015 compatible with a primary lung neoplasm. 2 small hypermetabolic right intraparotid soft tissue nodules. Probable small FDG avid left cervical lymph node, nonspecific, moderate sized hiatal hernia, containing indeterminate, focal hypermetabolism, diffuse thyroid hypermetabolism. Patient underwent SRS for the primary lung lesion (no biopsy).\par Patient has dementia.\par Son Emmanuel (a dentist). [de-identified] : +History of colon cancer-approx. 2012, s/p resection (Dr. Grayson).\par +History of hypercalcemia-s/p partial parathyroidectomy. [de-identified] : 2-3 weeks ago-had episode of difficulty breathing. Now breathing more easily. No c/o fevers, cough.\par Feels full sooner, after eating. No associated nausea/vomiting/abdominal pain. Is having bowel movements. No complaints of diarrhea or constipation.No melena, or hematochezia.\par Dementia progressing.\par Has appointment with Dr. Dubose tomorrow.\marisela Lives with her . Son Marcell, who lives close by assists patient and her . Marcell on speakerphone for visit.\par \par

## 2020-06-01 NOTE — RESULTS/DATA
[FreeTextEntry1] : 5/2020-CT scan chest/abdomen/pelvis-left hilar lymph node larger than on prior study. Enlarged AP window lymph nodes unchanged. The bandlike left upper lobe lung opacity unchanged. Focal thickening of the transverse colon versus colonic mass. Anastomotic suture lines at the rectosigmoid junction and in the small bowel with adjacent stasis.

## 2020-06-01 NOTE — REVIEW OF SYSTEMS
[Confused] : confusion [Negative] : Allergic/Immunologic [Dizziness] : no dizziness [Fainting] : no fainting

## 2020-06-02 PROBLEM — R91.8 LUNG MASS: Status: ACTIVE | Noted: 2017-07-20

## 2020-06-02 PROBLEM — C18.9 COLON CANCER: Status: ACTIVE | Noted: 2018-06-26

## 2020-06-02 PROBLEM — E03.9 HYPOTHYROIDISM: Status: ACTIVE | Noted: 2017-05-02

## 2020-06-02 NOTE — HISTORY OF PRESENT ILLNESS
[de-identified] : 88 year old woman comes to the office for followup accompanied by her  and son with advanced dementia followed by oncology for colon cancer and lung cancer hyperlipidemia hypothyroidism left bundle branch block ulcerative colitis and osteopenia her dementia is worsening in her assistance level is increasing son who wishes not to be aggressive in her care she denies temperature chills sweats or myalgias she said no headache sinus congestion sore throat cough wheezing pleurisy chest pain shortness of breath exertional dyspnea lightheadedness palpitations dizziness vertigo or syncope she has had no abdominal pain nausea vomiting diarrhea constipation bright red blood per rectum or black stools her appetite has been poor and she eats mostly cookies/chocolate chips she urinates frequently but denies dysuria or gross hematuria she said no leg edema and denies any recent skin rashes her sleeping is erratic and she has episodes of anxiety with outbursts [FreeTextEntry1] : Comes to the office for followup to review her medications and discuss her overall health chief complaint is that of worsening dementia

## 2020-06-02 NOTE — HEALTH RISK ASSESSMENT
[No] : No [] : No [1] : 1) Little interest or pleasure doing things for several days (1) [0] : 2) Feeling down, depressed, or hopeless: Not at all (0) [No falls in past year] : Patient reported no falls in the past year [[id number]] : 1

## 2020-06-02 NOTE — PHYSICAL EXAM
[No Acute Distress] : no acute distress [Well Nourished] : well nourished [Well-Appearing] : well-appearing [Well Developed] : well developed [PERRL] : pupils equal round and reactive to light [Normal Voice/Communication] : normal voice/communication [Normal Sclera/Conjunctiva] : normal sclera/conjunctiva [EOMI] : extraocular movements intact [Normal Outer Ear/Nose] : the outer ears and nose were normal in appearance [Normal Oropharynx] : the oropharynx was normal [Normal TMs] : both tympanic membranes were normal [Normal Nasal Mucosa] : the nasal mucosa was normal [No JVD] : no jugular venous distention [No Lymphadenopathy] : no lymphadenopathy [Thyroid Normal, No Nodules] : the thyroid was normal and there were no nodules present [Supple] : supple [Clear to Auscultation] : lungs were clear to auscultation bilaterally [No Respiratory Distress] : no respiratory distress  [No Accessory Muscle Use] : no accessory muscle use [Normal Rate] : normal rate  [Regular Rhythm] : with a regular rhythm [Normal S1, S2] : normal S1 and S2 [No Murmur] : no murmur heard [No Carotid Bruits] : no carotid bruits [No Abdominal Bruit] : a ~M bruit was not heard ~T in the abdomen [No Varicosities] : no varicosities [Pedal Pulses Present] : the pedal pulses are present [No Edema] : there was no peripheral edema [No Palpable Aorta] : no palpable aorta [No Extremity Clubbing/Cyanosis] : no extremity clubbing/cyanosis [Declined Breast Exam] : declined breast exam  [Soft] : abdomen soft [Non Tender] : non-tender [Non-distended] : non-distended [No HSM] : no HSM [No Masses] : no abdominal mass palpated [Normal Bowel Sounds] : normal bowel sounds [No Hernias] : no hernias [Declined Rectal Exam] : declined rectal exam [Normal Supraclavicular Nodes] : no supraclavicular lymphadenopathy [Normal Axillary Nodes] : no axillary lymphadenopathy [Normal Posterior Cervical Nodes] : no posterior cervical lymphadenopathy [Normal Inguinal Nodes] : no inguinal lymphadenopathy [Normal Anterior Cervical Nodes] : no anterior cervical lymphadenopathy [No CVA Tenderness] : no CVA  tenderness [No Spinal Tenderness] : no spinal tenderness [Normal Femoral Nodes] : no femoral lymphadenopathy [Grossly Normal Strength/Tone] : grossly normal strength/tone [No Joint Swelling] : no joint swelling [No Rash] : no rash [No Skin Lesions] : no skin lesions [Coordination Grossly Intact] : coordination grossly intact [Normal Gait] : normal gait [No Focal Deficits] : no focal deficits [Deep Tendon Reflexes (DTR)] : deep tendon reflexes were 2+ and symmetric [Speech Grossly Normal] : speech grossly normal [Normal Mood] : the mood was normal [Normal Affect] : the affect was normal [Normal Insight/Judgement] : insight and judgment were intact [Scoliosis] : no scoliosis [Kyphosis] : no kyphosis [Acne] : no acne [de-identified] : alert and oriented times 2

## 2020-06-02 NOTE — REVIEW OF SYSTEMS
[Nocturia] : nocturia [Joint Pain] : joint pain [Joint Stiffness] : joint stiffness [Back Pain] : back pain [Confusion] : confusion [Unsteady Walking] : ataxia [Memory Loss] : memory loss [Negative] : Heme/Lymph [Fever] : no fever [Chills] : no chills [Fatigue] : no fatigue [Hot Flashes] : no hot flashes [Night Sweats] : no night sweats [Recent Change In Weight] : ~T no recent weight change [Pain] : no pain [Redness] : no redness [Discharge] : no discharge [Itching] : no itching [Dryness] : no dryness  [Earache] : no earache [Vision Problems] : no vision problems [Nosebleed] : no nosebleeds [Hoarseness] : no hoarseness [Hearing Loss] : no hearing loss [Postnasal Drip] : no postnasal drip [Sore Throat] : no sore throat [Nasal Discharge] : no nasal discharge [Palpitations] : no palpitations [Chest Pain] : no chest pain [Leg Claudication] : no leg claudication [Lower Ext Edema] : no lower extremity edema [Orthopnea] : no orthopnea [Shortness Of Breath] : no shortness of breath [Wheezing] : no wheezing [Paroxysmal Nocturnal Dyspnea] : no paroxysmal nocturnal dyspnea [Cough] : no cough [Dyspnea on Exertion] : no dyspnea on exertion [Abdominal Pain] : no abdominal pain [Constipation] : no constipation [Nausea] : no nausea [Diarrhea] : diarrhea [Vomiting] : no vomiting [Melena] : no melena [Heartburn] : no heartburn [Dysuria] : no dysuria [Incontinence] : no incontinence [Hematuria] : no hematuria [Frequency] : no frequency [Poor Libido] : libido not poor [Dysmenorrhea] : no dysmenorrhea [Vaginal Discharge] : no vaginal discharge [Joint Swelling] : no joint swelling [Muscle Pain] : no muscle pain [Muscle Weakness] : no muscle weakness [Nail Changes] : no nail changes [Mole Changes] : no mole changes [Hair Changes] : no hair changes [Skin Rash] : no skin rash [Headache] : no headache [Fainting] : no fainting [Suicidal] : not suicidal [Dizziness] : no dizziness [Depression] : no depression [Insomnia] : no insomnia [Anxiety] : no anxiety [Easy Bleeding] : no easy bleeding [Easy Bruising] : no easy bruising [Swollen Glands] : no swollen glands [de-identified] : worsening dementia [FreeTextEntry9] : knee

## 2020-06-09 PROBLEM — I35.0 AORTIC STENOSIS: Status: ACTIVE | Noted: 2020-01-01

## 2020-06-09 PROBLEM — I44.7 LEFT BUNDLE BRANCH BLOCK (LBBB): Status: ACTIVE | Noted: 2019-04-16

## 2020-06-09 PROBLEM — R06.00 DYSPNEA: Status: ACTIVE | Noted: 2020-01-01

## 2020-06-09 NOTE — REASON FOR VISIT
[Follow-Up - Clinic] : a clinic follow-up of [Aortic Stenosis] : aortic stenosis [Dyspnea] : dyspnea [FreeTextEntry1] : Patient brought in for followup by her son. She was seen in the emergency room on May 16 for "shortness of breath". She had a nondiagnostic x-ray and was discharged. Her son states that he is unable to tell whether or not she is really short of breath when she is complaining of shortness of breath due to her dementia. He sometimes thinks she is just saying she is short of breath when she is thirsty.

## 2020-06-09 NOTE — ASSESSMENT
[FreeTextEntry1] : Impression:\par 1. Elderly woman with significant dementia with multiple medical problems. Based on physical exam it is possible that aortic stenosis has worsened, although would be unlikely to go from mild to severe in one years time. I have ordered repeat echocardiography. However the patient's son wishes to be very conservative at this time given her overall clinical picture and it is indeed appropriate.\par \par She will followup here as needed

## 2020-06-09 NOTE — PHYSICAL EXAM
[General Appearance - Well Developed] : well developed [Well Groomed] : well groomed [Normal Appearance] : normal appearance [No Deformities] : no deformities [General Appearance - Well Nourished] : well nourished [General Appearance - In No Acute Distress] : no acute distress [Normal Oral Mucosa] : normal oral mucosa [No Oral Pallor] : no oral pallor [No Oral Cyanosis] : no oral cyanosis [Normal Jugular Venous A Waves Present] : normal jugular venous A waves present [Normal Jugular Venous V Waves Present] : normal jugular venous V waves present [No Jugular Venous Ahmadi A Waves] : no jugular venous ahmadi A waves [Respiration, Rhythm And Depth] : normal respiratory rhythm and effort [Exaggerated Use Of Accessory Muscles For Inspiration] : no accessory muscle use [Abdomen Soft] : soft [Auscultation Breath Sounds / Voice Sounds] : lungs were clear to auscultation bilaterally [Abdomen Mass (___ Cm)] : no abdominal mass palpated [Abdomen Tenderness] : non-tender [Abnormal Walk] : normal gait [Gait - Sufficient For Exercise Testing] : the gait was sufficient for exercise testing [Nail Clubbing] : no clubbing of the fingernails [Cyanosis, Localized] : no localized cyanosis [Petechial Hemorrhages (___cm)] : no petechial hemorrhages [Skin Color & Pigmentation] : normal skin color and pigmentation [] : no rash [No Venous Stasis] : no venous stasis [Skin Lesions] : no skin lesions [No Skin Ulcers] : no skin ulcer [No Xanthoma] : no  xanthoma was observed [Oriented To Time, Place, And Person] : oriented to person, place, and time [Affect] : the affect was normal [Mood] : the mood was normal [5th Left ICS - MCL] : palpated at the 5th LICS in the midclavicular line [No Anxiety] : not feeling anxious [Normal Rate] : normal [Rhythm Regular] : regular [II] : a grade 2

## 2020-07-22 NOTE — PATIENT PROFILE ADULT - FALL HARM RISK TYPE OF ASSESSMENT
Dale Moody is a 17 year old male presenting with left thigh burn.  At 1:00pm today patient spilled a cup of hot water on thigh.  No medication taken.    Denies known Latex allergy or symptoms of Latex sensitivity.      Currently is not taking any medications.      Patient would like communication of their results via:        Home Phone: [phone] (home)  Okay to leave a message containing results? Yes    Cell Phone:   Telephone Information:   Mobile [phone]     Okay to leave a message containing results? Yes   admission

## 2020-07-22 NOTE — ED ADULT NURSE REASSESSMENT NOTE - NS ED NURSE REASSESS COMMENT FT1
Received report from Marci Orozco RN. Pt demented at baseline, no c/o pain/discomfort at this time. Vitals stable, resting comfortably in stretcher & awaiting CT results. Safety maintained & will continue to monitor.

## 2020-07-22 NOTE — H&P ADULT - ASSESSMENT
89F hx of advanced dementia, hypothyroid, LBBB, hx of colon ca s/p partial colectomy, hx of lung ca s/p cyberknife 2 years ago pw diarrhea and stercoral colitis.     # Stercoral colitis.    unable to care for pt in this condition.   Admit for IV antibxs, cont IV ceftriaxone and flagyl.   GI consult.   start clears and advanced diet as tolerated.    # Hypothyroid  cont synthroid    # Dementia  cont donepezil and namenda    #DVT/GI proph    #GOC   D/w son Marcell Cook, pt is DNR/DNI. MOLST form completed. 89F hx of advanced dementia, hypothyroid, LBBB, hx of colon ca s/p partial colectomy, hx of lung ca s/p cyberknife 2 years ago pw diarrhea and stercoral colitis.     # Stercoral colitis.    unable to care for pt in this condition.   Admit for IV antibxs, cont IV ceftriaxone and flagyl.   GI consult.   start clears and advanced diet as tolerated.    #Hypokalemia sec to diarrhea  K repleted. check mag    # Hypothyroid  cont synthroid    # Dementia  cont donepezil and namenda    #DVT/GI proph    #GOC   D/w son Marcell Cook, pt is DNR/DNI. MOLST form completed.

## 2020-07-22 NOTE — ED ADULT NURSE NOTE - OBJECTIVE STATEMENT
Pt presents to ED awake, alert, confused at baseline. As per medic patient has had diarrhea for two days. Pt unable to provide history.

## 2020-07-22 NOTE — ED PROVIDER NOTE - ATTENDING CONTRIBUTION TO CARE
Dr. Hernandez: I performed a face to face bedside interview with patient regarding history of present illness, review of symptoms and past medical history. I completed an independent physical exam.  I have discussed patient's plan of care with PA.   I agree with note as stated above, having amended the EMR as needed to reflect my findings.   This includes HISTORY OF PRESENT ILLNESS, HIV, PAST MEDICAL/SURGICAL/FAMILY/SOCIAL HISTORY, ALLERGIES AND HOME MEDICATIONS, REVIEW OF SYSTEMS, PHYSICAL EXAM, and any PROGRESS NOTES during the time I functioned as the attending physician for this patient.    see mdm

## 2020-07-22 NOTE — ED ADULT NURSE NOTE - INTERVENTIONS DEFINITIONS
Physically safe environment: no spills, clutter or unnecessary equipment/Monitor gait and stability/[place] to call system/Call bell, personal items and telephone within reach

## 2020-07-22 NOTE — ED ADULT NURSE REASSESSMENT NOTE - NS ED NURSE REASSESS COMMENT FT1
Pt remains agitated and confused; reassurance and reorientation provided. Will continue to assess for changes in status.

## 2020-07-22 NOTE — ED PROVIDER NOTE - OBJECTIVE STATEMENT
80 yr old female with hx of Dementia, colon and lung cancer, hypothyroid presents by EMS c/o diarrhea for the last 2 days.

## 2020-07-22 NOTE — ED PROVIDER NOTE - PROGRESS NOTE DETAILS
ROMÁN Meyer: patient s/o to me to f/u results. labs reviewed, abd CT results reviewed-- Stercoral colitis involving the rectosigmoid. Unasyn given. Patients son is in the ED requesting for admission given patient lives alone at home with her elderly  and it would be difficult for him to take care of her at home with the diarrhea

## 2020-07-22 NOTE — ED PROVIDER NOTE - CLINICAL SUMMARY MEDICAL DECISION MAKING FREE TEXT BOX
Dr. Hernandez: 80F h/o dementia, long and lung ca, hypothyroidism, presenting from home for 2 days of watery diarrhea. No fevers, chills, nausea, vomiting. On exam pt is well appearing, dry mucosa, rrr, ctab, abdo soft with diffuse ttp. Will hydrate, check labs, CT r/o colitis

## 2020-07-22 NOTE — H&P ADULT - NSHPPHYSICALEXAM_GEN_ALL_CORE
Vital Signs Last 24 Hrs  T(C): 36.8 (22 Jul 2020 15:50), Max: 36.8 (22 Jul 2020 15:50)  T(F): 98.3 (22 Jul 2020 15:50), Max: 98.3 (22 Jul 2020 15:50)  HR: 90 (22 Jul 2020 16:27) (80 - 90)  BP: 130/59 (22 Jul 2020 16:27) (121/53 - 130/59)  BP(mean): 79 (22 Jul 2020 16:27) (72 - 79)  RR: 16 (22 Jul 2020 16:27) (16 - 17)  SpO2: 99% (22 Jul 2020 16:27) (97% - 99%)  Daily Height in cm: 162.56 (22 Jul 2020 15:23)    Daily   CAPILLARY BLOOD GLUCOSE        I&O's Summary      GENERAL: NAD  HEAD:  Normocephalic  EYES: EOMI, PERRLA, conjunctiva and sclera clear  ENMT: No tonsillar erythema, exudates, or enlargement; DRY mucous membranes, No lesions  NECK: Supple, No JVD, no bruit, normal thyroid  NERVOUS SYSTEM:  Alert & Oriented X1, moves all fours.   CHEST/LUNG: Clear to auscultation bilaterally; No rales, rhonchi, wheezing, or rubs  HEART: Regular rate and rhythm; +systolic murmur, no rubs, or gallops  ABDOMEN: Soft, Nontender, Nondistended; Bowel sounds present  EXTREMITIES:  2+ Peripheral Pulses, No clubbing, cyanosis, or edema  LYMPH: No lymphadenopathy noted  SKIN: No rashes or lesions

## 2020-07-22 NOTE — H&P ADULT - HISTORY OF PRESENT ILLNESS
89F hx of advanced dementia, hypothyroid, LBBB, hx of colon ca s/p partial colectomy, hx of lung ca s/p cyberknife 2 years ago with questionable recurrent lung ca/colon ca (+CT lung nodule and ?colonic mass and elevated CEA) declined any further workup by family sec to advanced age and dementia pw hx of diarrhea x 3 days. Per son, pt lives with 94y/o  who related that she has been have multiple episodes of watery diarrhea with sig decreased appetite. No fevers, chills, NV or abd pain. Denied any recent antibx use - last use over 1 year ago. +sig decreased appetite over past 2 days. In ED, afebrile, hemodynamically stable. WBC: 10.9, Lactate: neg. CTAP: stercoral colitis.  Pt alert, awake. Denied any pain, unable to provide any meaningful hx.    son Marcell Cook at bedside provided hx.

## 2020-07-23 NOTE — DIETITIAN INITIAL EVALUATION ADULT. - OTHER INFO
Pt. w/ dementia, colon ca, lung ca. admitted w/ diarrhea, decreased appetite pta. rn reports no diarrhea since admission nor vomiting. present diet is clear fluids; did not have anything last evening. advised pushing fluids. skin intact. no edema noted. unable to obtain hx. due to mental state

## 2020-07-23 NOTE — PHYSICAL THERAPY INITIAL EVALUATION ADULT - GENERAL OBSERVATIONS, REHAB EVAL
Patient received supine in bed. NAD with CBWR. Patient alert, pleasantly confused, but able to follow commands

## 2020-07-23 NOTE — PHYSICAL THERAPY INITIAL EVALUATION ADULT - ADDITIONAL COMMENTS
Unable to get information from patient secondary to dementia. As per medical chart, patient lives in a house with 93 yr old spouse, and was independent with transfers and ambulation using a cane.

## 2020-07-23 NOTE — CHART NOTE - NSCHARTNOTEFT_GEN_A_CORE
Pt c/o diarrhea x 3 days admitted with stercoral colitis. Given an enema today. 3 BM's reported. Probable overflow diarrhea. Will given senna and Miralax and reassess in the am. Trend BMP for K+ tomorrow. 2.8 today.

## 2020-07-23 NOTE — DIETITIAN INITIAL EVALUATION ADULT. - NS FNS WEIGHT USED FOR CALC
Patient called with concern about her ear infection not being helped by the antiobiotic prescribed.  She's wondering if she should be referred to a specialist.  Please call her  
current

## 2020-07-23 NOTE — CONSULT NOTE ADULT - SUBJECTIVE AND OBJECTIVE BOX
This is a 89 y/o F with pmhx significant for advanced dementia, hypothyroid, LBBB, hx of colon ca s/p partial colectomy, hx of lung ca s/p cyberknife 2 years ago with questionable recurrent lung ca/colon ca (+CT lung nodule and ?colonic mass and elevated CEA) declined any further workup by family sec to advanced age and dementia presented with diarrhea x 3 days. Per son, pt lives with 92y/o  who related that she has been have multiple episodes of watery diarrhea with sig decreased appetite. No fevers, chills, NV or abd pain. Denied any recent antibx use - last use over 1 year ago. +sig decreased appetite over past 2 days. In ED, afebrile, hemodynamically stable. WBC: 10.9, Lactate: neg. CTAP: stercoral colitis. GI now called for CT a/p findings of stercoral colitis.       Allergies    sulfa drugs (Unknown)    Intolerances      MEDICATIONS:  MEDICATIONS  (STANDING):  donepezil 5 milliGRAM(s) Oral at bedtime  enoxaparin Injectable 40 milliGRAM(s) SubCutaneous daily  levothyroxine 75 MICROGram(s) Oral daily  melatonin 3 milliGRAM(s) Oral at bedtime  memantine 10 milliGRAM(s) Oral two times a day  pantoprazole    Tablet 40 milliGRAM(s) Oral before breakfast  potassium chloride   Powder 40 milliEquivalent(s) Oral once  sodium chloride 0.9%. 1000 milliLiter(s) (60 mL/Hr) IV Continuous <Continuous>    MEDICATIONS  (PRN):  acetaminophen   Tablet .. 650 milliGRAM(s) Oral every 6 hours PRN Temp greater or equal to 38C (100.4F), Mild Pain (1 - 3)    PAST MEDICAL & SURGICAL HISTORY:  Dementia  Colon cancer: 2007  Lung cancer: 2017  Hypothyroid  History of colon surgery    FAMILY HISTORY:    SOCIAL HISTORY:  [place]: [ ] Current, [ ] Former, [ ] Never; Pack Years:  Alcohol:  Illicit Drugs:    REVIEW OF SYSTEMS: limited given demented states     Vital Signs Last 24 Hrs  T(C): 36.4 (23 Jul 2020 05:11), Max: 36.8 (22 Jul 2020 15:50)  T(F): 97.5 (23 Jul 2020 05:11), Max: 98.3 (22 Jul 2020 15:50)  HR: 79 (23 Jul 2020 05:11) (74 - 90)  BP: 139/46 (23 Jul 2020 05:11) (121/53 - 139/46)  BP(mean): 71 (23 Jul 2020 05:11) (70 - 79)  RR: 17 (23 Jul 2020 05:11) (16 - 17)  SpO2: 97% (23 Jul 2020 05:11) (97% - 99%)    07-22 @ 07:01  -  07-23 @ 07:00  --------------------------------------------------------  IN: 240 mL / OUT: 150 mL / NET: 90 mL    07-23 @ 07:01  -  07-23 @ 14:44  --------------------------------------------------------  IN: 740 mL / OUT: 0 mL / NET: 740 mL        PHYSICAL EXAM:  General: Well developed; well nourished; in no acute distress  HEENT: MMM, conjunctiva and sclera clear  Gastrointestinal: Soft, non-tender non-distended; Normal bowel sounds; No rebound or guarding  Extremities: Normal range of motion, No clubbing, cyanosis or edema  Neurological: Alert and oriented x1  Skin: Warm and dry. No obvious rash    LABS:                        12.0   7.86  )-----------( 220      ( 23 Jul 2020 05:30 )             37.3     07-23    143  |  107  |  11  ----------------------------<  79  2.8<LL>   |  24  |  0.71    Ca    8.6      23 Jul 2020 05:30  Mg     1.9     07-23    TPro  5.9<L>  /  Alb  2.5<L>  /  TBili  0.4  /  DBili  x   /  AST  23  /  ALT  21  /  AlkPhos  51  07-23        PT/INR - ( 22 Jul 2020 15:45 )   PT: 13.2 sec;   INR: 1.10 ratio         PTT - ( 22 Jul 2020 15:45 )  PTT:29.6 sec    RADIOLOGY & ADDITIONAL STUDIES:         Assessment/Plan     #Stercoral Colitis  1. Given no leukocytosis, afebrile and non toxic appearing, GI will continue to monitor. No antibiotics warranted at this time.   2. Pt seen and evaluated with Dr. Chiang at the bedside   3. Will continue to follow     Patrica Torres NP  Gastroenterology   GI Cell: [phone]

## 2020-07-23 NOTE — CONSULT NOTE ADULT - ATTENDING COMMENTS
Patient seen and examined with Patrica Torres NP.  Agree with assessment and plan as above.    Elderly female with multiple medical problems, including colon ca s/p partial colectomy admitted with weakness and reports of diarrhea that appear to be resolved at present.  Patient is confused, does not admit to any acute complaints.     VSS.  Abdomen soft, nontender    Normal WBC count.    Recommend conservative management.  I do not suspect infectious process at this time  Advance diet as tolerated  Discussed with Dr. Claros.

## 2020-07-23 NOTE — PHYSICAL THERAPY INITIAL EVALUATION ADULT - GAIT TRAINING, PT EVAL
In 2-3 sessions, patient will walk 75ft using AD with CGA In 2-3 sessions, patient will walk 50ft using AD with CGA

## 2020-07-23 NOTE — PHYSICAL THERAPY INITIAL EVALUATION ADULT - PERTINENT HX OF CURRENT PROBLEM, REHAB EVAL
89F hx of advanced dementia, hypothyroid, LBBB, hx of colon ca s/p partial colectomy, hx of lung ca s/p cyberknife 2 years ago with questionable recurrent lung ca/colon ca  pw hx of diarrhea x 3 days. Per son, patient had multiple episodes of watery diarrhea with sig decreased appetite. No fevers, chills, NV or abd pain. +sig decreased appetite over past 2 days. In ED, afebrile, hemodynamically stable. WBC: 10.9, Lactate: neg. CTAP: stercoral colitis.

## 2020-07-24 NOTE — ED ADULT NURSE NOTE - OBJECTIVE STATEMENT
89 yr old female BIB EMS from home c/o slurred speech today at 1430 as per son when she was discharged from rehab; pt alert , able to answer questions, speech is mildly slurred but comprehensible; as per son pt speech has improved since 1430 today; pt able to move all extremities however has generalized weakness; unable to lift b/l legs off stretcher but able to move b/l arms

## 2020-07-24 NOTE — PROGRESS NOTE ADULT - ASSESSMENT
Assessment/Plan     Patient is a 89y old  Female who presents with a chief complaint of diarrhea, colitis    #Stercoral Colitis:  - Given no leukocytosis, afebrile and non toxic appearing, GI will continue to monitor. No antibiotics warranted at this time.    - Will continue to follow
afebrile no abd tenderness white count normal   probable constipation with overflow diarrhea  fleet enema ordered  discuss with family their ability to care for her ....physical therapy and  involvement
discussed with   wants her home FABIO was discussed ..discussed Dr Андрей JULIAN  no active issues

## 2020-07-24 NOTE — ED PROVIDER NOTE - ATTENDING CONTRIBUTION TO CARE
89 y/o F with h/o Dementia admitted yesterday for colitis- As per son, last he saw pt normal was 6pm when he left hospital. Pt DC'ed today around 2 pm. Son states when he picked her up she was "zonked out", sluggish and had slurred speech. He assumed it was a result of sedation or medication they may have given her. He got home, put a call into [facility] around 3:30pm and heard back from him around 5:30 who suggested he go to the ER for a stroke work up. States mom normally walks with a cane, is alert, able to communicate prior to 2 days ago. Currently "loss of complete motor function". Patient is alert, following commands but exhibiting generalized weakness, sluggish and speech slowed. According to med rec patient was given Haldol and Zyprexa today (not on patient's normal med list).  Plan: will proceed with stroke work up, likely a result of medication- CT head, labs, Trop, EKG, and reassess  **update: CT head negative for acute stroke. Spoke with son who states he can't take care of patient. Requesting rehab placement. Discussed with Dr. Partida. Will have to readmit.  Dr. Toure:  I have reviewed and discussed with the PA/ resident the case specifics, including the history, physical assessment, evaluation, conclusion, laboratory results, and medical plan. I agree with the contents, and conclusions. I have personally examined, and interviewed the patient.

## 2020-07-24 NOTE — ED PROVIDER NOTE - PROGRESS NOTE DETAILS
ROMÁN Wang- CT head negative for acute stroke. Spoke with son who states he can't take care of patient. Requesting rehab placement. Discussed with Dr. Partida. Will have to readmit.

## 2020-07-24 NOTE — DISCHARGE NOTE PROVIDER - HOSPITAL COURSE
Hospital Course    89y Female with PMH of advanced dementia, hypothyroid, LBBB, hx of colon ca s/p partial colectomy, hx of lung cancer s/p cyberknife 2 years ago with questionable recurrent lung ca/colon ca (+CT lung nodule and ?colonic mass and elevated CEA) declined any further workup by family sec to advanced age and dementia, presented to ED for diarrhea x 3 days. Per son, pt lives with 94y/o  who related that she had been having multiple episodes of watery diarrhea with significantly decreased appetite. No fevers, chills, NV or abd pain. Denied any recent antibx use - last use over 1 year ago. +sig decreased appetite over past 2 days. In ED, afebrile, hemodynamically stable. WBC: 10.9, Lactate: neg. CTAP: stercoral colitis and constipation with overflow diarrhea. She was evaluated by GI, no antibiotics recommended. She was treated with enema and laxatives.         Source of Infection: Stercoral colitis    Antibiotic / Last Day: N/a        Palliative Care / Advanced Care Planning    Code Status: DNR/I        Discharging Provider    [phone]- Please call with any questions or concerns         Outpatient Provider: Dr Claros         Signout given to  SNF Provider: Hospital Course    89y Female with PMH of advanced dementia, hypothyroid, LBBB, hx of colon ca s/p partial colectomy, hx of lung cancer s/p cyberknife 2 years ago with questionable recurrent lung ca/colon ca (+CT lung nodule and ?colonic mass and elevated CEA) declined any further workup by family sec to advanced age and dementia, presented to ED for diarrhea x 3 days. Per son, pt lives with 92y/o  who related that she had been having multiple episodes of watery diarrhea with significantly decreased appetite. No fevers, chills, NV or abd pain. Denied any recent antibx use - last use over 1 year ago. +sig decreased appetite over past 2 days. In ED, afebrile, hemodynamically stable. WBC: 10.9, Lactate: neg. CTAP: stercoral colitis and constipation with overflow diarrhea. She was evaluated by GI, no antibiotics recommended. She was treated with enema and laxatives. Her potassium has been low since having increased bowel movements. Potassium has been replaced with both IV and PO potassium. Her Potassium will need to be followed up outpatient. She is resting comfortably, confused and calling out.  states this is baseline and is requesting her to come home.         Source of Infection: Stercoral colitis    Antibiotic / Last Day: N/a        Palliative Care / Advanced Care Planning    Code Status: DNR/I        Discharging Provider    [phone]- Please call with any questions or concerns         Outpatient Provider: Dr Claros         Signout given to  SNF Provider:

## 2020-07-24 NOTE — ED PROVIDER NOTE - OBJECTIVE STATEMENT
89 y/o F with h/o Dementia admitted yesterday for colitis- As per son, last he saw pt normal was 6pm when he left hospital. Pt DC'ed today around 2 pm. Son states when he picked her up she was "zonked out", sluggish and had slurred speech. He assumed it was a result of sedation or medication they may have given her. He got home, put a call into PMD [facility]ata around 3:30pm and heard back from him around 5:30 who suggested he go to the ER for a stroke work up. States mom normally walks with a cane, is alert, able to communicate. Currently "loss of complete motor function", not following commands and slurring speech.     Emmanuel: [phone] 89 y/o F with h/o Dementia admitted yesterday for colitis- As per son, last he saw pt normal was 6pm when he left hospital. Pt DC'ed today around 2 pm. Son states when he picked her up she was "zonked out", sluggish and had slurred speech. He assumed it was a result of sedation or medication they may have given her. He got home, put a call into [facility]D [facility] around 3:30pm and heard back from him around 5:30 who suggested he go to the ER for a stroke work up. States mom normally walks with a cane, is alert, able to communicate prior to 2 days ago. Currently "loss of complete motor function". Patient is alert, following commands but exhibiting generalized weakness. According to med rec patient was given Haldol and Zyprexa today (not on patient's normal med list.     Emmanuel: [phone]

## 2020-07-24 NOTE — H&P ADULT - NSHPLABSRESULTS_GEN_ALL_CORE
13.8   9.25  )-----------( 308      ( 24 Jul 2020 19:30 )             42.2       07-24    142  |  104  |  8   ----------------------------<  97  3.5   |  27  |  0.96    Ca    9.4      24 Jul 2020 20:45  Mg     1.9     07-23    TPro  7.0  /  Alb  2.8<L>  /  TBili  0.9  /  DBili  x   /  AST  61<H>  /  ALT  29  /  AlkPhos  53  07-24    Lactate, Blood: 0.8 mmol/L (07-22 @ 15:45)       LIVER FUNCTIONS - ( 24 Jul 2020 19:30 )  Alb: 2.8 g/dL / Pro: 7.0 g/dL / ALK PHOS: 53 U/L / ALT: 29 U/L / AST: 61 U/L / GGT: x                   CARDIAC MARKERS ( 24 Jul 2020 19:30 )  .021 ng/mL / x     / x     / x     / x          Serum Pro-Brain Natriuretic Peptide: 1367 pg/mL (07-24-20 @ 19:30)        CAPILLARY BLOOD GLUCOSE      POCT Blood Glucose.: 101 mg/dL (24 Jul 2020 19:11)        EKG:  NSR, LBBB        < from: CT Brain Stroke Protocol (07.24.20 @ 19:34) >    IMPRESSION:  Streak artifacts from tooth fillings causes obscuration of the cervical cranial junction.  No acute intracranial findings.  If acute stroke is of clinical concern, MRI with diffusion-weighted images would be helpful for further characterization.      < end of copied text >    < from: Xray Chest 1 View AP/PA (07.24.20 @ 19:29) >    Chest x-ray appearance is similar to July 22 of this year.    IMPRESSION: Persistent bandlike mass left upper lobe.    < end of copied text >

## 2020-07-24 NOTE — ED ADULT TRIAGE NOTE - CHIEF COMPLAINT QUOTE
as per son her speech is slurped after discharge from hospital as per son her speech is slurred after discharge from hospital today around 230-3p

## 2020-07-24 NOTE — DISCHARGE NOTE NURSING/CASE MANAGEMENT/SOCIAL WORK - NSDCFUADDAPPT_GEN_ALL_CORE_FT
Dr. Claros on July 28 at 1:30 pm.  Please call [phone] Dr. Claros on July 28 at 1:30 pm.  Please call [phone] to reschedule if needed.

## 2020-07-24 NOTE — H&P ADULT - ASSESSMENT
Lead called back and said there was an ama pt leaving detox sometime today, would possibly open a bed if this pt wanted to wait. Intake offered and explained this however he still wanted to come back tomorrow.   
Spoke to lead RN Lynn. We currently do not any open detox beds, and no expected discharges for the rest of the night. He was requesting help stopping his etoh abuse. His options where given, and the patient said he would prefer to try again tomorrow here rather than go somewhere else. He was not complaining of withdraw complications at this time. He will be given outpatient resources, detox pack, advised of s/s that would warrant return.   
89F hx of advanced dementia, hypothyroid, LBBB, hx of colon ca s/p partial colectomy, hx of lung ca s/p cyberknife 2 years ago with questionable recurrent lung ca/colon ca (+CT lung nodule and ?colonic mass and elevated CEA) declined any further workup by family sec to advanced age and dementia admitted 7/22 for diarrhea with stercoral colitis and D/C earlier today and readmitted for AMS, excess sedation likely sec to haldol/zyprexa.    # AMS  Head CT negative  Sxs resolved. AVOID any sedative/antipsychotics  PT evaluation in AM. Baseline pt is ambulatory.   SW evaluation - pt lives with 92y/o  - family will need some home aide vs acute rehab.     #Diarrhea/hypokalemia/stercoral colitis  monitor for additional sxs.   K normal now.     # Hypothyroid  cont synthroid    #Dementia  cont donepezil/namenda    #DVT/GI proph

## 2020-07-24 NOTE — DISCHARGE NOTE NURSING/CASE MANAGEMENT/SOCIAL WORK - PATIENT PORTAL LINK FT
You can access the FollowMyHealth Patient Portal offered by [facility] by registering at the following website: [email or web address]. By joining [employer]’s FollowMyHealth portal, you will also be able to view your health information using other applications (apps) compatible with our system.

## 2020-07-24 NOTE — DISCHARGE NOTE PROVIDER - NSDCFUSCHEDAPPT_GEN_ALL_CORE_FT
SEBAS GREGORY ; 09/03/2020 ; MICHAEL Stuart [address] [place] [address]  SEBAS GREGORY ; 09/15/2020 ; MICHAEL James SEBAS GREGORY ; 09/03/2020 ; MICHAEL Stuart [address] [place] [address]  SEBAS GREGORY ; 09/15/2020 ; MICHAEL James SEBAS GREGORY ; 09/03/2020 ; MICHAEL Stuart [address] [place] [address]  SEBAS GREGORY ; 09/15/2020 ; MICHAEL James

## 2020-07-24 NOTE — DISCHARGE NOTE PROVIDER - NSDCMRMEDTOKEN_GEN_ALL_CORE_FT
donepezil 5 mg oral tablet: 1 tab(s) orally once a day (at bedtime)  Namenda 10 mg oral tablet: 1 tab(s) orally 2 times a day  risedronate 35 mg oral tablet: 1 tab(s) orally once a week  Synthroid 75 mcg (0.075 mg) oral tablet: 1 tab(s) orally once a day

## 2020-07-24 NOTE — ED ADULT NURSE NOTE - CHPI ED NUR SYMPTOMS NEG
no loss of consciousness/no confusion/no numbness/no fever/no blurred vision/no nausea/no vomiting/no dizziness/no change in level of consciousness

## 2020-07-24 NOTE — H&P ADULT - NSHPPHYSICALEXAM_GEN_ALL_CORE
Vital Signs Last 24 Hrs  T(C): 36.7 (24 Jul 2020 22:47), Max: 37 (24 Jul 2020 22:28)  T(F): 98 (24 Jul 2020 22:47), Max: 98.6 (24 Jul 2020 22:28)  HR: 84 (24 Jul 2020 22:47) (84 - 96)  BP: 120/66 (24 Jul 2020 22:47) (97/59 - 129/58)  BP(mean): --  RR: 16 (24 Jul 2020 22:47) (16 - 23)  SpO2: 97% (24 Jul 2020 22:47) (95% - 99%)  Daily Height in cm: 162.56 (24 Jul 2020 22:47)    Daily   CAPILLARY BLOOD GLUCOSE      POCT Blood Glucose.: 101 mg/dL (24 Jul 2020 19:11)    I&O's Summary      GENERAL: NAD  HEAD:  Normocephalic  EYES: EOMI, PERRLA, conjunctiva and sclera clear  ENMT: No tonsillar erythema, exudates, or enlargement; Moist mucous membranes, No lesions  NECK: Supple, No JVD, no bruit, normal thyroid  NERVOUS SYSTEM:  Alert & Oriented X1, moves all fours easily.   CHEST/LUNG: Clear to auscultation bilaterally; No rales, rhonchi, wheezing, or rubs  HEART: Regular rate and rhythm; No murmurs, rubs, or gallops  ABDOMEN: Soft, Nontender, Nondistended; Bowel sounds present  EXTREMITIES:  2+ Peripheral Pulses, No clubbing, cyanosis, or edema  LYMPH: No lymphadenopathy noted  SKIN: No rashes or lesions

## 2020-07-24 NOTE — DISCHARGE NOTE PROVIDER - CARE PROVIDER_API CALL
Guido Dubose  GASTROENTEROLOGY  [address] [place] [address] [address]  [place] [address]  Phone: [phone]  Fax: [phone]  Follow Up Time:

## 2020-07-24 NOTE — PROGRESS NOTE ADULT - ATTENDING COMMENTS
Patient seen and examined.  Agree with assessment and plan as above.    She has no complaints.  Tolerating PO without difficulty.  No diarrhea at present time.    VSS.  Abdomen soft, nontender to palpation    Diet as tolerated  Monitor bowel movements  Discharge planning

## 2020-07-24 NOTE — DISCHARGE NOTE PROVIDER - NSDCCPCAREPLAN_GEN_ALL_CORE_FT
PRINCIPAL DISCHARGE DIAGNOSIS  Diagnosis: Colitis  Assessment and Plan of Treatment: - You came to the hospital for diarrhea. Your CT scan showed stercoral colitis, likely due to constipation which can cause overflow diarrhea.  - Take laxatives as needed for constipation. Drink plenty of water and try to get up and move around for at least 10 minutes every hour.  - Follow up with Dr. Claros  - Return to hospital for any new or worsening symptoms      SECONDARY DISCHARGE DIAGNOSES  Diagnosis: Routine health maintenance  Assessment and Plan of Treatment: - Follow up with Dr Claros for management of all other health conditions. PRINCIPAL DISCHARGE DIAGNOSIS  Diagnosis: Colitis  Assessment and Plan of Treatment: - You came to the hospital for diarrhea. Your CT scan showed stercoral colitis, likely due to constipation which can cause overflow diarrhea.  - Take laxatives as needed for constipation. Drink plenty of water and try to get up and move around for at least 10 minutes every hour.  - Follow up with Dr. Claros. You will need to recheck your Potassium levels with blood work because they were low.   - Return to hospital for any new or worsening symptoms      SECONDARY DISCHARGE DIAGNOSES  Diagnosis: Routine health maintenance  Assessment and Plan of Treatment: - Follow up with Dr Claros for management of all other health conditions.

## 2020-07-24 NOTE — ED PROVIDER NOTE - CLINICAL SUMMARY MEDICAL DECISION MAKING FREE TEXT BOX
89 y/o F with h/o Dementia admitted yesterday for colitis- As per son, last he saw pt normal was 6pm when he left hospital. Pt DC'ed today around 2 pm. Son states when he picked her up she was "zonked out", sluggish and had slurred speech. He assumed it was a result of sedation or medication they may have given her. He got home, put a call into PMD Nata around 3:30pm and heard back from him around 5:30 who suggested he go to the ER for a stroke work up. States mom normally walks with a cane, is alert, able to communicate. Currently "loss of complete motor function", not following commands and slurring speech. Plan: Out of window for TPA- will proceed with storke work up- CT head, labs, Trop, EKG, and admit 89 y/o F with h/o Dementia admitted yesterday for colitis- As per son, last he saw pt normal was 6pm when he left hospital. Pt DC'ed today around 2 pm. Son states when he picked her up she was "zonked out", sluggish and had slurred speech. He assumed it was a result of sedation or medication they may have given her. He got home, put a call into PMD Nata around 3:30pm and heard back from him around 5:30 who suggested he go to the ER for a stroke work up. States mom normally walks with a cane, is alert, able to communicate prior to 2 days ago. Currently "loss of complete motor function". Patient is alert, following commands but exhibiting generalized weakness, sluggish and speech slowed. According to med rec patient was given Haldol and Zyprexa today (not on patient's normal med list).  Plan: will proceed with stroke work up, likely a result of medication- CT head, labs, Trop, EKG, and reassess 88 y/o F with h/o Dementia admitted yesterday for colitis- As per son, last he saw pt normal was 6pm when he left hospital. Pt DC'ed today around 2 pm. Son states when he picked her up she was "zonked out", sluggish and had slurred speech. He assumed it was a result of sedation or medication they may have given her. He got home, put a call into PMD Nata around 3:30pm and heard back from him around 5:30 who suggested he go to the ER for a stroke work up. States mom normally walks with a cane, is alert, able to communicate prior to 2 days ago. Currently "loss of complete motor function". Patient is alert, following commands but exhibiting generalized weakness, sluggish and speech slowed. According to med rec patient was given Haldol and Zyprexa today (not on patient's normal med list).  Plan: will proceed with stroke work up, likely a result of medication- CT head, labs, Trop, EKG, and reassess  **update: CT head negative for acute stroke. Spoke with son who states he can't take care of patient. Requesting rehab placement. Discussed with Dr. Partida. Will have to readmit.

## 2020-07-24 NOTE — H&P ADULT - HISTORY OF PRESENT ILLNESS
89F hx of advanced dementia, hypothyroid, LBBB, hx of colon ca s/p partial colectomy, hx of lung ca s/p cyberknife 2 years ago with questionable recurrent lung ca/colon ca (+CT lung nodule and ?colonic mass and elevated CEA) declined any further workup by family sec to advanced age and dementia admitted 7/22 for diarrhea with stercoral colitis and D/C earlier today. When pt was picked up by son, she was lethargic more confused and 'out of it' and after they went home, pt was still sluggish and had slurring her words. Son had called PMD who advised ED eval to r/o CVA. In ED, hemodynamically stable, more alert but still weak but neurologically initact. CT head neg for acute event.  Upon chart review, pt was given Zyprexa 2.5mg IM and Haldol 2mg x2 IM overnight sec to agitation. Currently, pt is completely alert, confused but pleasant and follows directions and instructions and moves all fours. Denied any HA, CP, SOB, abd pain or dysuria. Wants to go home

## 2020-07-24 NOTE — PROGRESS NOTE ADULT - SUBJECTIVE AND OBJECTIVE BOX
89 y/o F with pmhx significant for advanced dementia, hypothyroid, LBBB, hx of colon ca s/p partial colectomy, hx of lung ca s/p cyberknife 2 years ago with questionable recurrent lung ca/colon ca (+CT lung nodule and ?colonic mass and elevated CEA) declined any further workup by family sec to advanced age and dementia presented with diarrhea x 3 days. Per son, pt lives with 92y/o  who related that she has been have multiple episodes of watery diarrhea with sig decreased appetite. No fevers, chills, NV or abd pain. Denied any recent antibx use - last use over 1 year ago. +sig decreased appetite over past 2 days. In ED, afebrile, hemodynamically stable. WBC: 10.9, Lactate: neg. CTAP: stercoral colitis. GI now called for CT a/p findings of stercoral colitis.       INTERVAL HPI/OVERNIGHT EVENTS: No Events overnight as per staff    MEDICATIONS  (STANDING):  donepezil 5 milliGRAM(s) Oral at bedtime  enoxaparin Injectable 40 milliGRAM(s) SubCutaneous daily  levothyroxine 75 MICROGram(s) Oral daily  melatonin 3 milliGRAM(s) Oral at bedtime  memantine 10 milliGRAM(s) Oral two times a day  pantoprazole    Tablet 40 milliGRAM(s) Oral before breakfast  potassium chloride   Powder 40 milliEquivalent(s) Oral every 4 hours  potassium chloride  10 mEq/100 mL IVPB 10 milliEquivalent(s) IV Intermittent every 1 hour  sodium chloride 0.9%. 1000 milliLiter(s) (60 mL/Hr) IV Continuous <Continuous>  sodium chloride 0.9%. 500 milliLiter(s) (100 mL/Hr) IV Continuous <Continuous>    MEDICATIONS  (PRN):  acetaminophen   Tablet .. 650 milliGRAM(s) Oral every 6 hours PRN Temp greater or equal to 38C (100.4F), Mild Pain (1 - 3)      Allergies    sulfa drugs (Unknown)    Intolerances        Review of Systems:    General:  No wt loss, fevers, chills, night sweats, fatigue   Eyes:  Good vision, no reported pain  ENT:  No sore throat, pain, runny nose, dysphagia  CV:  No pain, palpitations, hypo/hypertension  Resp:  No dyspnea, cough, tachypnea, wheezing  GI:  No pain, No nausea, No vomiting, No diarrhea, No constipation, No weight loss, No fever, No pruritis, No rectal bleeding, No melena, No dysphagia  :  No pain, bleeding, incontinence, nocturia  Muscle:  No pain, weakness  Neuro:  No weakness, tingling, memory problems  Endocrine:  No polyuria, polydipsia cold/heat intolerance  Heme:  No petechiae, ecchymosis, easy bruisability  Skin:  No rash, tattoos, scars, edema      Vital Signs Last 24 Hrs  T(C): 36.5 (2020 20:40), Max: 36.7 (2020 15:21)  T(F): 97.7 (2020 20:40), Max: 98.1 (2020 15:21)  HR: 99 (2020 20:40) (89 - 99)  BP: 151/76 (2020 20:40) (119/75 - 151/76)  BP(mean): --  RR: 16 (2020 20:40) (16 - 16)  SpO2: 97% (2020 20:40) (96% - 97%)    PHYSICAL EXAM:    Constitutional: NAD  HEENT: EOMI, throat clear  Neck: No LAD, supple  Respiratory: CTA and P  Cardiovascular: S1 and S2, RRR, no M  Gastrointestinal: BS+, soft, NT/ND, neg HSM.  Extremities: No peripheral edema, neg clubbing, cyanosis  Vascular: 2+ peripheral pulses  Skin: No rashes      LABS:                        13.5   8.71  )-----------( 342      ( 2020 07:00 )             40.6     07-24    143  |  104  |  6<L>  ----------------------------<  128<H>  2.7<LL>   |  25  |  0.89    Ca    9.2      2020 07:00  Mg     1.9     07-23    TPro  5.9<L>  /  Alb  2.5<L>  /  TBili  0.4  /  DBili  x   /  AST  23  /  ALT  21  /  AlkPhos  51  07-23    PT/INR - ( 2020 15:45 )   PT: 13.2 sec;   INR: 1.10 ratio         PTT - ( 2020 15:45 )  PTT:29.6 sec  Urinalysis Basic - ( 2020 16:43 )    Color: Yellow / Appearance: Clear / S.005 / pH: x  Gluc: x / Ketone: Small  / Bili: Negative / Urobili: Negative   Blood: x / Protein: Negative / Nitrite: Negative   Leuk Esterase: Small / RBC: 0-4 /HPF / WBC 3-5 /HPF   Sq Epi: x / Non Sq Epi: Neg.-Few / Bacteria: Few /HPF        RADIOLOGY & ADDITIONAL TESTS:    EXAM:  CT ABDOMEN AND PELVIS IC      PROCEDURE DATE:  2020        INTERPRETATION:  CLINICAL INFORMATION: Abdominal pain and diarrhea. Concern for colitis    COMPARISON: 2020.    PROCEDURE:   CT of the Abdomen and Pelvis was performed with intravenous contrast.   Intravenous contrast: 90 ml Omnipaque 350. 10 ml discarded.  Oral contrast: None.  Sagittal and coronal reformats were performed.    FINDINGS:  LOWER CHEST: Moderately large hiatal hernia. Coronary artery calcification.    LIVER: Within normal limits.  BILE DUCTS: Normal caliber.  GALLBLADDER: Within normal limits.  SPLEEN: Within normal limits.  PANCREAS: Within normal limits.  ADRENALS: Within normal limits.  KIDNEYS/URETERS: A few small bilateral renal cysts.    BLADDER: Within normal limits.  REPRODUCTIVE ORGANS: Unremarkable uterus. No adnexal mass.    BOWEL: No bowel obstruction. Appendix is normal. Prior small bowel surgery with a suture line in the right lower quadrant. Cannot exclude mural thickening at thislevel, however, there is no dilatation of the small bowel proximal to this. Mural thickening, pericolic haziness and stranding at the level of rectosigmoid indicating stercoral colitis. Also evidence of a suture line at this level.  PERITONEUM: No ascites.  VESSELS: Atherosclerotic changes.  RETROPERITONEUM/LYMPH NODES: No lymphadenopathy.    ABDOMINAL WALL: Within normal limits.  BONES: Degenerative changes.    IMPRESSION:   Stercoral colitis involving the rectosigmoid.    Prior right lower quadrant small bowel and rectosigmoid surgery.    Hiatal hernia and additional findings as above.      KING SHARMA M.D.,ATTENDING RADIOLOGIST  This document has been electronically signed. 2020  7:25PM
Patient is a 89y old  Female who presents with a chief complaint of diarrhea, colitis (22 Jul 2020 20:37)      INTERVAL HPI/OVERNIGHT EVENTS:  confused  claims no abd pain      REVIEW OF SYSTEMS:  CONSTITUTIONAL: No fever, weight loss, or fatigue  EYES: No eye pain, visual disturbances, or discharge  ENMT:  No difficulty hearing, tinnitus, vertigo; No sinus or throat pain  NECK: No pain or stiffness  BREASTS: No pain, masses, or nipple discharge  RESPIRATORY: No cough, wheezing, chills or hemoptysis; No shortness of breath  CARDIOVASCULAR: No chest pain, palpitations, dizziness, or leg swelling  GASTROINTESTINAL: No abdominal or epigastric pain. No nausea, vomiting, or hematemesis; No diarrhea or constipation. No melena or hematochezia.  GENITOURINARY: No dysuria, frequency, hematuria, or incontinence  NEUROLOGICAL: No headaches, memory loss, loss of strength, numbness, or tremors  SKIN: No itching, burning, rashes, or lesions   LYMPH NODES: No enlarged glands  ENDOCRINE: No heat or cold intolerance; No hair loss  MUSCULOSKELETAL: No joint pain or swelling; No muscle, back, or extremity pain  PSYCHIATRIC: No depression, anxiety, mood swings, or difficulty sleeping  HEME/LYMPH: No easy bruising, or bleeding gums  ALLERY AND IMMUNOLOGIC: No hives or eczema  FAMILY HISTORY:    T(C): 36.4 (07-23-20 @ 05:11), Max: 36.8 (07-22-20 @ 15:50)  HR: 79 (07-23-20 @ 05:11) (74 - 90)  BP: 139/46 (07-23-20 @ 05:11) (121/53 - 139/46)  RR: 17 (07-23-20 @ 05:11) (16 - 17)  SpO2: 97% (07-23-20 @ 05:11) (97% - 99%)  Wt(kg): --Vital Signs Last 24 Hrs  T(C): 36.4 (23 Jul 2020 05:11), Max: 36.8 (22 Jul 2020 15:50)  T(F): 97.5 (23 Jul 2020 05:11), Max: 98.3 (22 Jul 2020 15:50)  HR: 79 (23 Jul 2020 05:11) (74 - 90)  BP: 139/46 (23 Jul 2020 05:11) (121/53 - 139/46)  BP(mean): 71 (23 Jul 2020 05:11) (70 - 79)  RR: 17 (23 Jul 2020 05:11) (16 - 17)  SpO2: 97% (23 Jul 2020 05:11) (97% - 99%)    T(F): 97.5 (07-23-20 @ 05:11), Max: 98.3 (07-22-20 @ 15:50)  HR: 79 (07-23-20 @ 05:11) (74 - 90)  BP: 139/46 (07-23-20 @ 05:11) (121/53 - 139/46)  RR: 17 (07-23-20 @ 05:11) (16 - 17)  SpO2: 97% (07-23-20 @ 05:11) (97% - 99%)    PHYSICAL EXAM:  GENERAL: NAD, well-groomed, well-developed  HEAD:  Atraumatic, Normocephalic  EYES: EOMI, PERRLA, conjunctiva and sclera clear  ENMT: No tonsillar erythema, exudates, or enlargement; Moist mucous membranes, Good dentition, No lesions  NECK: Supple, No JVD, Normal thyroid  NERVOUS SYSTEM:  Alert & Oriented X3, Good concentration; Motor Strength 5/5 B/L upper and lower extremities; DTRs 2+ intact and symmetric  CHEST/LUNG: Clear to percussion bilaterally; No rales, rhonchi, wheezing, or rubs  HEART: Regular rate and rhythm; No murmurs, rubs, or gallops  ABDOMEN: Soft, Nontender, Nondistended; Bowel sounds present  EXTREMITIES:  2+ Peripheral Pulses, No clubbing, cyanosis, or edema  LYMPH: No lymphadenopathy noted  SKIN: No rashes or lesions    Consultant(s) Notes Reviewed:  [x ] YES  [ ] NO  Care Discussed with Consultants/Other Providers [ x] YES  [ ] NO    LABS:  07-23    143  |  107  |  11  ----------------------------<  79  2.8<LL>   |  24  |  0.71    Ca    8.6      23 Jul 2020 05:30  Mg     1.9     07-23    TPro  5.9<L>  /  Alb  2.5<L>  /  TBili  0.4  /  DBili  x   /  AST  23  /  ALT  21  /  AlkPhos  51  07-23                          12.0   7.86  )-----------( 220      ( 23 Jul 2020 05:30 )             37.3         PT/INR - ( 22 Jul 2020 15:45 )   PT: 13.2 sec;   INR: 1.10 ratio         PTT - ( 22 Jul 2020 15:45 )  PTT:29.6 sec  LIVER FUNCTIONS - ( 23 Jul 2020 05:30 )  Alb: 2.5 g/dL / Pro: 5.9 g/dL / ALK PHOS: 51 U/L / ALT: 21 U/L / AST: 23 U/L / GGT: x                            12.0   7.86  )-----------( 220      ( 07-23 @ 05:30 )             37.3                13.0   10.87 )-----------( 252      ( 07-22 @ 15:45 )             39.5               RADIOLOGY & ADDITIONAL TESTS:    Imaging Personally Reviewed:  [ ] YES  [ ] NO  acetaminophen   Tablet .. 650 milliGRAM(s) Oral every 6 hours PRN  donepezil 5 milliGRAM(s) Oral at bedtime  enoxaparin Injectable 40 milliGRAM(s) SubCutaneous daily  levothyroxine 75 MICROGram(s) Oral daily  melatonin 3 milliGRAM(s) Oral at bedtime  memantine 10 milliGRAM(s) Oral two times a day  pantoprazole    Tablet 40 milliGRAM(s) Oral before breakfast  potassium chloride   Powder 40 milliEquivalent(s) Oral once  potassium chloride  10 mEq/100 mL IVPB 10 milliEquivalent(s) IV Intermittent every 1 hour  saline laxative (FLEET) Rectal Enema 1 Enema Rectal once  sodium chloride 0.9%. 1000 milliLiter(s) IV Continuous <Continuous>      HEALTH ISSUES - PROBLEM Dx:
Patient is a 89y old  Female who presents with a chief complaint of diarrhea, colitis (24 Jul 2020 08:12)      INTERVAL HPI/OVERNIGHT EVENTS: no complaints      REVIEW OF SYSTEMS:  CONSTITUTIONAL: No fever, weight loss, or fatigue  EYES: No eye pain, visual disturbances, or discharge  ENMT:  No difficulty hearing, tinnitus, vertigo; No sinus or throat pain  NECK: No pain or stiffness  BREASTS: No pain, masses, or nipple discharge  RESPIRATORY: No cough, wheezing, chills or hemoptysis; No shortness of breath  CARDIOVASCULAR: No chest pain, palpitations, dizziness, or leg swelling  GASTROINTESTINAL: No abdominal or epigastric pain. No nausea, vomiting, or hematemesis; No diarrhea or constipation. No melena or hematochezia.  GENITOURINARY: No dysuria, frequency, hematuria, or incontinence  NEUROLOGICAL: No headaches, memory loss, loss of strength, numbness, or tremors  SKIN: No itching, burning, rashes, or lesions   LYMPH NODES: No enlarged glands  ENDOCRINE: No heat or cold intolerance; No hair loss  MUSCULOSKELETAL: No joint pain or swelling; No muscle, back, or extremity pain  PSYCHIATRIC: No depression, anxiety, mood swings, or difficulty sleeping  HEME/LYMPH: No easy bruising, or bleeding gums  ALLERY AND IMMUNOLOGIC: No hives or eczema  FAMILY HISTORY:    T(C): 36.5 (07-23-20 @ 20:40), Max: 36.7 (07-23-20 @ 15:21)  HR: 99 (07-23-20 @ 20:40) (89 - 99)  BP: 151/76 (07-23-20 @ 20:40) (119/75 - 151/76)  RR: 16 (07-23-20 @ 20:40) (16 - 16)  SpO2: 97% (07-23-20 @ 20:40) (96% - 97%)  Wt(kg): --Vital Signs Last 24 Hrs  T(C): 36.5 (23 Jul 2020 20:40), Max: 36.7 (23 Jul 2020 15:21)  T(F): 97.7 (23 Jul 2020 20:40), Max: 98.1 (23 Jul 2020 15:21)  HR: 99 (23 Jul 2020 20:40) (89 - 99)  BP: 151/76 (23 Jul 2020 20:40) (119/75 - 151/76)  BP(mean): --  RR: 16 (23 Jul 2020 20:40) (16 - 16)  SpO2: 97% (23 Jul 2020 20:40) (96% - 97%)    T(F): 97.7 (07-23-20 @ 20:40), Max: 98.3 (07-22-20 @ 15:50)  HR: 99 (07-23-20 @ 20:40) (74 - 99)  BP: 151/76 (07-23-20 @ 20:40) (119/75 - 151/76)  RR: 16 (07-23-20 @ 20:40) (16 - 17)  SpO2: 97% (07-23-20 @ 20:40) (96% - 99%)    PHYSICAL EXAM:  GENERAL: NAD, well-groomed, well-developed  HEAD:  Atraumatic, Normocephalic  EYES: EOMI, PERRLA, conjunctiva and sclera clear  ENMT: No tonsillar erythema, exudates, or enlargement; Moist mucous membranes, Good dentition, No lesions  NECK: Supple, No JVD, Normal thyroid  NERVOUS SYSTEM:  Alert & Oriented X3, Good concentration; Motor Strength 5/5 B/L upper and lower extremities; DTRs 2+ intact and symmetric  CHEST/LUNG: Clear to percussion bilaterally; No rales, rhonchi, wheezing, or rubs  HEART: Regular rate and rhythm; No murmurs, rubs, or gallops  ABDOMEN: Soft, Nontender, Nondistended; Bowel sounds present  EXTREMITIES:  2+ Peripheral Pulses, No clubbing, cyanosis, or edema  LYMPH: No lymphadenopathy noted  SKIN: No rashes or lesions    Consultant(s) Notes Reviewed:  [x ] YES  [ ] NO  Care Discussed with Consultants/Other Providers [ x] YES  [ ] NO    LABS:  07-24    143  |  104  |  6<L>  ----------------------------<  128<H>  2.7<LL>   |  25  |  0.89    Ca    9.2      24 Jul 2020 07:00  Mg     1.9     07-23    TPro  5.9<L>  /  Alb  2.5<L>  /  TBili  0.4  /  DBili  x   /  AST  23  /  ALT  21  /  AlkPhos  51  07-23                          13.5   8.71  )-----------( 342      ( 24 Jul 2020 07:00 )             40.6       Culture - Blood (collected 07-22-20 @ 15:45)  Source: .Blood Blood-Peripheral  Preliminary Report (07-23-20 @ 23:01):    No growth to date.    Culture - Blood (collected 07-22-20 @ 15:45)  Source: .Blood Blood-Peripheral  Preliminary Report (07-23-20 @ 23:01):    No growth to date.      Thyroid Stimulating Hormone, Serum: 1.43 uIU/mL (07-23 @ 05:30)    PT/INR - ( 22 Jul 2020 15:45 )   PT: 13.2 sec;   INR: 1.10 ratio         PTT - ( 22 Jul 2020 15:45 )  PTT:29.6 sec  LIVER FUNCTIONS - ( 23 Jul 2020 05:30 )  Alb: 2.5 g/dL / Pro: 5.9 g/dL / ALK PHOS: 51 U/L / ALT: 21 U/L / AST: 23 U/L / GGT: x                            13.5   8.71  )-----------( 342      ( 07-24 @ 07:00 )             40.6                12.0   7.86  )-----------( 220      ( 07-23 @ 05:30 )             37.3                13.0   10.87 )-----------( 252      ( 07-22 @ 15:45 )             39.5               RADIOLOGY & ADDITIONAL TESTS:    Imaging Personally Reviewed:  [ ] YES  [ ] NO  acetaminophen   Tablet .. 650 milliGRAM(s) Oral every 6 hours PRN  donepezil 5 milliGRAM(s) Oral at bedtime  enoxaparin Injectable 40 milliGRAM(s) SubCutaneous daily  levothyroxine 75 MICROGram(s) Oral daily  melatonin 3 milliGRAM(s) Oral at bedtime  memantine 10 milliGRAM(s) Oral two times a day  pantoprazole    Tablet 40 milliGRAM(s) Oral before breakfast  potassium chloride   Powder 40 milliEquivalent(s) Oral every 4 hours  potassium chloride  10 mEq/100 mL IVPB 10 milliEquivalent(s) IV Intermittent once  sodium chloride 0.9%. 1000 milliLiter(s) IV Continuous <Continuous>      HEALTH ISSUES - PROBLEM Dx:

## 2020-07-25 NOTE — CHART NOTE - NSCHARTNOTEFT_GEN_A_CORE
Upon Nutritional Assessment by the Registered Dietitian your patient was determined to meet criteria / has evidence of the following diagnosis/diagnoses:            [X] Severe Protein Calorie Malnutrition        Findings as based on:  [x] Comprehensive nutrition assessment   [x] Nutrition Focused Physical Exam: severe muscle wasting/fat loss (see initial assessment)  [x] Other: minimal intake at meals      Nutrition Plan/Recommendations:    1. downgrade diet to soft (bite-sized) + Ensure Enlive 8oz TID  2. 1:1 feeding assistance with encouragement     PROVIDER Section:     By signing this assessment you are acknowledging and agree with the diagnosis/diagnoses assigned by the Registered Dietitian    Comments:

## 2020-07-25 NOTE — DIETITIAN INITIAL EVALUATION ADULT. - ETIOLOGY
related to inability to meet sufficient nutritional needs in setting of catabolic, advanced illness, advanced dementia

## 2020-07-25 NOTE — DIETITIAN INITIAL EVALUATION ADULT. - OTHER INFO
89F hx of advanced dementia, hypothyroid, LBBB, hx of colon ca s/p partial colectomy, hx of lung ca s/p cyberknife 2 years ago with questionable recurrent lung ca/colon ca (+CT lung nodule and ?colonic mass and elevated CEA) declined any further workup by family sec to advanced age and dementia, admitted 7/22 for diarrhea with stercoral colitis and discharged yesterday, returned to hospital a few hours later because patient was lethargic and confused.   Pt seen this am, unable to obtain hx 2/2 advanced dementia. Pt ate close to 0% of bfast per RN, only drank some fluids. Noted poor intake on previous admission 2/2 diarrhea on 7/23 per previous RD note. Pt requires 1:1 feeding assistance. Would suggest downgrade diet to soft (bite-sized) for ease of chewing + add Ensure Enlive 8oz TID. Pt with evidence of severe malnutrition based on NFPE. No skin breakdown or edema noted.

## 2020-07-25 NOTE — CHART NOTE - NSCHARTNOTEFT_GEN_A_CORE
Reviewed prior progress notes, labs and imaging.    Discussed with Dr. Claros, case management     Primary Diagnosis: 89F hx of advanced dementia, hypothyroid, LBBB, hx of colon ca s/p partial colectomy, hx of lung ca s/p cyberknife 2 years ago with questionable recurrent lung ca/colon ca (+CT lung nodule and ?colonic mass and elevated CEA) declined any further workup by family sec to advanced age and dementia, admitted 7/22 for diarrhea with stercoral colitis and discharged yesterday, returned to hospital a few hours later because patient was lethargic and confused. Son had called PMD who advised return to ED to r/o CVA. In ED, hemodynamically stable, more alert but still weak but neurologically intact. CT head neg for acute event.  Upon chart review, pt had been given Zyprexa 2.5mg IM and Haldol 2mg x2 IM overnight sec to agitation - suspect symptoms related to medication. Currently improved     Plan:    #Dementia  - Supportive care  - PT consult  - Family requesting [facility]  - Stable for DC to [facility] after PT consult and pending auth Reviewed prior progress notes, labs and imaging.    Discussed with Dr. Claros, case management     Primary Diagnosis: 89F hx of advanced dementia, hypothyroid, LBBB, hx of colon ca s/p partial colectomy, hx of lung ca s/p cyberknife 2 years ago with questionable recurrent lung ca/colon ca (+CT lung nodule and ?colonic mass and elevated CEA) declined any further workup by family sec to advanced age and dementia, admitted 7/22 for diarrhea with stercoral colitis and discharged yesterday, returned to hospital a few hours later because patient was lethargic and confused. Son had called PMD who advised return to ED to r/o CVA. In ED, hemodynamically stable, more alert but still weak but neurologically intact. CT head neg for acute event.  Upon chart review, pt had been given Zyprexa 2.5mg IM and Haldol 2mg x2 IM overnight sec to agitation - suspect symptoms related to medication. Currently improved     Plan:    #Dementia  - Supportive care  - continue home meds   - PT consult  - Family requesting [facility]  - Stable for DC to [facility] after PT consult and pending auth Reviewed prior progress notes, labs and imaging.    Discussed with Dr. Claros, case management     Primary Diagnosis: 89F hx of advanced dementia, hypothyroid, LBBB, hx of colon ca s/p partial colectomy, hx of lung ca s/p cyberknife 2 years ago with questionable recurrent lung ca/colon ca (+CT lung nodule and ?colonic mass and elevated CEA) declined any further workup by family sec to advanced age and dementia, admitted 7/22 for diarrhea with stercoral colitis and discharged yesterday, returned to hospital a few hours later because patient was lethargic and confused. Son had called PMD who advised return to ED to r/o CVA. In ED, hemodynamically stable, more alert but still weak but neurologically intact. CT head neg for acute event.  Upon chart review, pt had been given Zyprexa 2.5mg IM and Haldol 2mg x2 IM overnight sec to agitation - suspect symptoms related to medication.     Plan:    #Dementia, suspect acute hypoactive delirium 2/2 hospitalization and sedating medications   - Supportive care  - continue home meds   - PT consult  - Family requesting [facility]  - Unable to tolerate PO at this time due to delirium, will start D5 infusion to prevent hypoglycemia

## 2020-07-26 NOTE — CHART NOTE - NSCHARTNOTEFT_GEN_A_CORE
Reviewed prior progress notes, labs and imaging.    Discussed with Dr. Claros, case management     Primary Diagnosis: 89F hx of advanced dementia, hypothyroid, LBBB, hx of colon ca s/p partial colectomy, hx of lung ca s/p cyberknife 2 years ago with questionable recurrent lung ca/colon ca (+CT lung nodule and ?colonic mass and elevated CEA) declined any further workup by family sec to advanced age and dementia, admitted 7/22 for diarrhea with stercoral colitis and discharged yesterday, returned to hospital a few hours later because patient was lethargic and confused. Son had called PMD who advised return to ED to r/o CVA. In ED, hemodynamically stable, more alert but still weak but neurologically intact. CT head neg for acute event.  Upon chart review, pt had been given Zyprexa 2.5mg IM and Haldol 2mg x2 IM overnight sec to agitation - suspect symptoms related to medication.     Plan:    #Dementia, suspect acute hypoactive delirium 2/2 hospitalization and sedating medications   - Supportive care  - continue home meds   - PT consult  - Family requesting [facility]  - Mental status is improved, now tolerating PO    #Hypokalemia  - Will replete now and start daily supplement

## 2020-07-26 NOTE — PHYSICAL THERAPY INITIAL EVALUATION ADULT - PERTINENT HX OF CURRENT PROBLEM, REHAB EVAL
89F hx of advanced dementia, hypothyroid, LBBB, hx of colon ca s/p partial colectomy, hx of lung ca s/p cyberknife 2 years ago with questionable recurrent lung ca/colon ca. admitted 7/22 for diarrhea with stercoral colitis and D/C earlier today and readmitted for AMS

## 2020-07-26 NOTE — PHYSICAL THERAPY INITIAL EVALUATION ADULT - ADDITIONAL COMMENTS
Pt is confused and unable to participate in initial assessment. Pt's  is present throughout the session and assisting with history. Per , Pt live in a private home with her . There is 2 steps to enter the home and no railing. Pt uses a SPC for ambulation around the house.

## 2020-07-27 NOTE — CONSULT NOTE ADULT - SUBJECTIVE AND OBJECTIVE BOX
HPI:  89F hx of advanced dementia, hypothyroid, LBBB, hx of colon ca s/p partial colectomy, hx of lung ca s/p cyberknife 2 years ago with questionable recurrent lung ca/colon ca (+CT lung nodule and ?colonic mass and elevated CEA) family declined any further workup sec to advanced age and dementia admitted 7/22 for diarrhea with stercoral colitis and D/C earlier today. When pt was picked up by son, she was lethargic more confused and 'out of it' and after they went home, pt was still sluggish and had slurring her words. Son had called PMD who advised ED eval to r/o CVA. In ED, hemodynamically stable, more alert but still weak but neurologically initact. CT head neg for acute event.  Upon chart review, pt was given Zyprexa 2.5mg IM and Haldol 2mg x2 IM overnight sec to agitation. Currently, pt is completely alert, confused but pleasant and follows directions and instructions and moves all fours. Denied any HA, CP, SOB, abd pain or dysuria.       PAST MEDICAL & SURGICAL HISTORY:  Dementia  Colon cancer: 2007  Lung cancer: 2017  Hypothyroid  History of colon surgery      SOCIAL HISTORY:    Admitted from:  home   Substance abuse history:              Tobacco hx:                  Alcohol hx:              Home Opioid hx:  Anabaptist:                                    Preferred Language:    Surrogate/HCP/son:      Emmanuel       Phone#: [phone]    FAMILY HISTORY:    Baseline ADLs (prior to admission):    Allergies    sulfa drugs (Unknown)    Intolerances      Present Symptoms:   Dyspnea:   Nausea/Vomiting:   Anxiety:  Depressed   Fatigue:  Loss of appetite:   Pain:                                location:          Review of Systems:  Unable to obtain due to poor mentation]    MEDICATIONS  (STANDING):  donepezil 5 milliGRAM(s) Oral at bedtime  enoxaparin Injectable 40 milliGRAM(s) SubCutaneous daily  levothyroxine 75 MICROGram(s) Oral daily  memantine 10 milliGRAM(s) Oral two times a day  pantoprazole    Tablet 40 milliGRAM(s) Oral before breakfast  potassium chloride    Tablet ER 20 milliEquivalent(s) Oral daily  sodium chloride 0.9%. 1000 milliLiter(s) (75 mL/Hr) IV Continuous <Continuous>    MEDICATIONS  (PRN):  acetaminophen   Tablet .. 650 milliGRAM(s) Oral every 6 hours PRN Temp greater or equal to 38C (100.4F), Mild Pain (1 - 3)      PHYSICAL EXAM:    Vital Signs Last 24 Hrs  T(C): 36.4 (27 Jul 2020 05:48), Max: 36.6 (26 Jul 2020 15:39)  T(F): 97.6 (27 Jul 2020 05:48), Max: 97.8 (26 Jul 2020 15:39)  HR: 78 (27 Jul 2020 05:48) (78 - 89)  BP: 141/71 (27 Jul 2020 05:48) (110/62 - 141/71)  BP(mean): --  RR: 16 (27 Jul 2020 05:48) (15 - 16)  SpO2: 97% (27 Jul 2020 05:48) (94% - 97%)    General: alert ,verbal, pleasantly confused   Karnofsky Performance Score/Palliative Performance Status Version2:   30-40  %  HEENT: normal  dry mouth    Lungs: clear dim to bases , breathing comfortably   CV: normal  rate  GI: normal  , soft, n/t    Musculoskeletal: normal  w/weakness, no  edema   Skin: normal  w/d :  Neuro: alert, converses, but confused as to place, time, year, follows simple commands   Oral intake ability: as zeeshan  Diet: as zeeshan     LABS:                        13.8   7.04  )-----------( 253      ( 26 Jul 2020 06:28 )             43.9     07-27    144  |  109<H>  |  16  ----------------------------<  127<H>  3.5   |  24  |  0.88    Ca    9.6      27 Jul 2020 09:30          RADIOLOGY & ADDITIONAL STUDIES:< from: Xray Chest 1 View AP/PA (07.24.20 @ 19:29) >  EXAM:  XR CHEST AP OR PA 1V      PROCEDURE DATE:  07/24/2020        INTERPRETATION:  AP chest on July 24, 2020 at 6:56 PM. Patient has weakness and concern is CVA. Covid virus test was negative on July 23. Patient has slurred speech. There is history of dementia.    There is history of aortic stenosis, colitis, colon cancer, and heart palpitations. There is history of parathyroid adenoma and left left bundle-branch block. There is history of lung cancer.    Heart is magnified by technique.    Bandlike density in the left apex is better seen on CAT scan of May 26 of this year. This likely correlates with patient's known cancer.    Chest x-ray appearance is similar to July 22 of this year.    IMPRESSION: Persistent bandlike mass left upper lobe.      < from: CT Brain Stroke Protocol (07.24.20 @ 19:34) >  EXAM:  CT BRAIN STROKE PROTOCOL      PROCEDURE DATE:  07/24/2020        INTERPRETATION:                          Head CT without contrast   COMPARISON: None.  CLINICAL INFORMATION: Altered mental status.  TECHNIQUE: Contiguous axial 2.5 mm slice thickness images of the head were obtained without the use of intravenous contrast media.  This scan was performed using automatic exposure control (radiation dose reduction software) to obtain a diagnostic image quality scan with patient dose as low asreasonably achievable.     FINDINGS:    Streak artifacts from teeth obscure the craniocervical junction.   Mild cerebral volume loss is present with secondary proportional prominence of the sulci and ventricles.      The posterior fossa structures and basal cisterns appear normal.    There is no intracranial hemorrhage.     There is no intracranial mass or midline shift.    There is no sulcal effacement to suggest acute stroke.        The basal ganglia and thalami appear normal in morphology and attenuation.    The visualized portions of the optic globes and paranasal sinuses are normal.    There are no skull fractures.    IMPRESSION:  Streak artifacts from tooth fillings causes obscuration of the cervical cranial junction.  No acute intracranial findings.  If acute stroke is of clinical concern, MRI with diffusion-weighted images would be helpful for further characterization.              ADVANCE DIRECTIVES: hcp   Advanced Care Planning discussion total time spent:

## 2020-07-27 NOTE — CONSULT NOTE ADULT - ASSESSMENT
A/P 89F hx of advanced dementia, hypothyroid, LBBB, hx of colon ca s/p partial colectomy, hx of lung ca s/p cyberknife 2 years ago with questionable recurrent lung ca/colon ca (+CT lung nodule and ?colonic mass and elevated CEA) declined any further workup by family sec to advanced age and dementia admitted 7/22 for diarrhea with stercoral colitis and D/C earlier today and readmitted for AMS, excess sedation likely sec to haldol/zyprexa.    AMS  Head CT negative  Sxs resolved. AVOID any sedative/antipsychotics  PT evaluation in AM. Baseline pt is ambulatory.   SW evaluation - pt lives with 94y/o      #Diarrhea/hypokalemia/stercoral colitis  monitor for additional sxs.   K normal now.     # Hypothyroid  cont synthroid    #Dementia  cont donepezil/namenda    #DVT/GI proph    Palliative : was asked by med team this morning for goc, pt already in for hospice consultation for home hospice services.  Spoke to [facility] rep , pt will be approved for home services. Pt in bed, awake, converses using  few words, was pleasant but confused, unable to give history or where she lives.  Pt appeared comfortable and had no sob.    I called son Emmanuel, [phone], left  explaining why  I called and asked for return call. Contact info provided.  Awaiting return call .

## 2020-07-27 NOTE — DISCHARGE NOTE PROVIDER - NSDCFUSCHEDAPPT_GEN_ALL_CORE_FT
[facility] ; 07/28/2020 ; MICHAEL Stuart [address] [place] [address] ; 09/03/2020 ; MICHAEL Stuart [address] [place] [address] ; 09/15/2020 ; MICHAEL James SEBAS GREGORY ; 09/03/2020 ; MICHAEL Stuart [address] [place] [address]  SEBAS GREGORY ; 09/15/2020 ; MICHAEL James

## 2020-07-27 NOTE — DISCHARGE NOTE PROVIDER - CARE PROVIDER_API CALL
Guido Dubose  GASTROENTEROLOGY  [address] [place] [address] [address]  [place] [address]  Phone: [phone]  Fax: [phone]  Follow Up Time:

## 2020-07-27 NOTE — DISCHARGE NOTE PROVIDER - HOSPITAL COURSE
Hospital Course    Primary Diagnosis  Slurred speech     89F hx of advanced dementia, hypothyroid, LBBB, hx of colon ca s/p partial colectomy, hx of lung ca s/p cyberknife 2 years ago with questionable recurrent lung ca/colon ca (+CT lung nodule and ?colonic mass and elevated CEA) declined any further workup by family sec to advanced age and dementia, admitted 7/22 for diarrhea with stercoral colitis and discharged yesterday, returned to hospital a few hours later because patient was lethargic and confused. Son had called PMD who advised return to ED to r/o CVA. In ED, hemodynamically stable, more alert but still weak but neurologically intact. CT head neg for acute event.  Upon chart review, pt had been given Zyprexa 2.5mg IM and Haldol 2mg x2 IM overnight sec to agitation - suspect symptoms related to medication.  Pt returned to back to base  line after hydration and holding sedation medication.  Pt will return home with home hospice and will have pallative care consult prior to leaving hospital.          Source of Infection: NA    Antibiotic / Last Day:        Palliative Care / Advanced Care Planning-  Pallative consult    Code Status:    Patient/Family agreeable to Hospice/Palliative (Y)    Summary of Goals of Care Conversation:        Discharging Provider:  Nadine Salazar    Contact Info: Cell [phone] - Please call with any questions or concerns.        Outpatient Provider: Dr Dubose-  [facility]    pt going home with home hospice

## 2020-07-27 NOTE — DISCHARGE NOTE PROVIDER - NSDCCPCAREPLAN_GEN_ALL_CORE_FT
PRINCIPAL DISCHARGE DIAGNOSIS  Diagnosis: Generalized weakness  Assessment and Plan of Treatment: pt back to baseline with hydration, caution with sedative medication   pt was using ie zyprexia and haldol      SECONDARY DISCHARGE DIAGNOSES  Diagnosis: Hypothyroid  Assessment and Plan of Treatment: continue synthroid    Diagnosis: LBBB (left bundle branch block)  Assessment and Plan of Treatment: supportive care    Diagnosis: Cancer of lung  Assessment and Plan of Treatment: supportive care with home hospice    Diagnosis: Colonic cancer  Assessment and Plan of Treatment: supportive care with home hospice    Diagnosis: Dementia  Assessment and Plan of Treatment: caution with using medications that cause sedation PRINCIPAL DISCHARGE DIAGNOSIS  Diagnosis: Generalized weakness  Assessment and Plan of Treatment: pt back to baseline with hydration, caution with sedative medication   pt was given ie zyprexia and haldol  home with home hospice   supportive care  pt has one new medication added to regimen potassium supplement to be taken once a day with food      SECONDARY DISCHARGE DIAGNOSES  Diagnosis: Hypothyroid  Assessment and Plan of Treatment: continue synthroid    Diagnosis: LBBB (left bundle branch block)  Assessment and Plan of Treatment: supportive care    Diagnosis: Cancer of lung  Assessment and Plan of Treatment: supportive care with home hospice    Diagnosis: Colonic cancer  Assessment and Plan of Treatment: supportive care with home hospice    Diagnosis: Dementia  Assessment and Plan of Treatment: caution with using medications that cause sedation

## 2020-07-27 NOTE — CHART NOTE - NSCHARTNOTEFT_GEN_A_CORE
Reviewed prior progress notes, labs and imaging.    Discussed with Dr Dubose    Primary Diagnosis  Slurred speech   89F hx of advanced dementia, hypothyroid, LBBB, hx of colon ca s/p partial colectomy, hx of lung ca s/p cyberknife 2 years ago with questionable recurrent lung ca/colon ca (+CT lung nodule and ?colonic mass and elevated CEA) declined any further workup by family sec to advanced age and dementia, admitted 7/22 for diarrhea with stercoral colitis and discharged yesterday, returned to hospital a few hours later because patient was lethargic and confused. Son had called PMD who advised return to ED to r/o CVA. In ED, hemodynamically stable, more alert but still weak but neurologically intact. CT head neg for acute event.  Upon chart review, pt had been given Zyprexa 2.5mg IM and Haldol 2mg x2 IM overnight sec to agitation - suspect symptoms related to medication.     Plan: Sub acute rehab    #Dementia, suspect acute hypoactive delirium 2/2 hospitalization and sedating medications   - Supportive care  - continue home meds   - PT consult  - Family requesting FABIO  - Mental status is improved, now tolerating PO    #Hypokalemia  - Will replete now and start daily supplement. Reviewed prior progress notes, labs and imaging.    Discussed with Dr Dubose    Primary Diagnosis  Slurred speech   89F hx of advanced dementia, hypothyroid, LBBB, hx of colon ca s/p partial colectomy, hx of lung ca s/p cyberknife 2 years ago with questionable recurrent lung ca/colon ca (+CT lung nodule and ?colonic mass and elevated CEA) declined any further workup by family sec to advanced age and dementia, admitted 7/22 for diarrhea with stercoral colitis and discharged yesterday, returned to hospital a few hours later because patient was lethargic and confused. Son had called PMD who advised return to ED to r/o CVA. In ED, hemodynamically stable, more alert but still weak but neurologically intact. CT head neg for acute event.  Upon chart review, pt had been given Zyprexa 2.5mg IM and Haldol 2mg x2 IM overnight sec to agitation - suspect symptoms related to medication.     Plan: Home with hospice care    #Dementia, suspect acute hypoactive delirium 2/2 hospitalization and sedating medications   - Supportive care  - continue home meds   - PT consult  - Family requesting FABIO  - Mental status is improved, now tolerating PO    #Hypokalemia  - Will replete now and start daily supplement.

## 2020-07-28 NOTE — GOALS OF CARE CONVERSATION - ADVANCED CARE PLANNING - CONVERSATION DETAILS
[facility]    Pt approved.  Consents received. DME to be delivered between 10 am & 2pm. D/c pending today after DME delivery      Shyanne Dawson RN

## 2020-07-28 NOTE — PROGRESS NOTE ADULT - SUBJECTIVE AND OBJECTIVE BOX
Patient is a 89y old  Female who presents with a chief complaint of slurred speech (24 Jul 2020 22:45)      INTERVAL HPI/OVERNIGHT EVENTS: asleep      REVIEW OF SYSTEMS:  CONSTITUTIONAL: No fever, weight loss, or fatigue  EYES: No eye pain, visual disturbances, or discharge  ENMT:  No difficulty hearing, tinnitus, vertigo; No sinus or throat pain  NECK: No pain or stiffness  BREASTS: No pain, masses, or nipple discharge  RESPIRATORY: No cough, wheezing, chills or hemoptysis; No shortness of breath  CARDIOVASCULAR: No chest pain, palpitations, dizziness, or leg swelling  GASTROINTESTINAL: No abdominal or epigastric pain. No nausea, vomiting, or hematemesis; No diarrhea or constipation. No melena or hematochezia.  GENITOURINARY: No dysuria, frequency, hematuria, or incontinence  NEUROLOGICAL: No headaches, memory loss, loss of strength, numbness, or tremors  SKIN: No itching, burning, rashes, or lesions   LYMPH NODES: No enlarged glands  ENDOCRINE: No heat or cold intolerance; No hair loss  MUSCULOSKELETAL: No joint pain or swelling; No muscle, back, or extremity pain  PSYCHIATRIC: No depression, anxiety, mood swings, or difficulty sleeping  HEME/LYMPH: No easy bruising, or bleeding gums  ALLERY AND IMMUNOLOGIC: No hives or eczema  FAMILY HISTORY:    T(C): 36.7 (07-25-20 @ 05:56), Max: 37 (07-24-20 @ 22:28)  HR: 92 (07-25-20 @ 05:56) (84 - 96)  BP: 124/75 (07-25-20 @ 05:56) (97/59 - 129/58)  RR: 16 (07-25-20 @ 05:56) (16 - 23)  SpO2: 96% (07-25-20 @ 05:56) (95% - 99%)  Wt(kg): --Vital Signs Last 24 Hrs  T(C): 36.7 (25 Jul 2020 05:56), Max: 37 (24 Jul 2020 22:28)  T(F): 98 (25 Jul 2020 05:56), Max: 98.6 (24 Jul 2020 22:28)  HR: 92 (25 Jul 2020 05:56) (84 - 96)  BP: 124/75 (25 Jul 2020 05:56) (97/59 - 129/58)  BP(mean): --  RR: 16 (25 Jul 2020 05:56) (16 - 23)  SpO2: 96% (25 Jul 2020 05:56) (95% - 99%)    T(F): 98 (07-25-20 @ 05:56), Max: 98.6 (07-24-20 @ 22:28)  HR: 92 (07-25-20 @ 05:56) (74 - 99)  BP: 124/75 (07-25-20 @ 05:56) (97/59 - 151/76)  RR: 16 (07-25-20 @ 05:56) (16 - 23)  SpO2: 96% (07-25-20 @ 05:56) (95% - 99%)    PHYSICAL EXAM:  GENERAL: NAD, well-groomed, well-developed  HEAD:  Atraumatic, Normocephalic  EYES: EOMI, PERRLA, conjunctiva and sclera clear  ENMT: No tonsillar erythema, exudates, or enlargement; Moist mucous membranes, Good dentition, No lesions  NECK: Supple, No JVD, Normal thyroid  NERVOUS SYSTEM:  Alert & Oriented X3, Good concentration; Motor Strength 5/5 B/L upper and lower extremities; DTRs 2+ intact and symmetric  CHEST/LUNG: Clear to percussion bilaterally; No rales, rhonchi, wheezing, or rubs  HEART: Regular rate and rhythm; No murmurs, rubs, or gallops  ABDOMEN: Soft, Nontender, Nondistended; Bowel sounds present  EXTREMITIES:  2+ Peripheral Pulses, No clubbing, cyanosis, or edema  LYMPH: No lymphadenopathy noted  SKIN: No rashes or lesions    Consultant(s) Notes Reviewed:  [x ] YES  [ ] NO  Care Discussed with Consultants/Other Providers [ x] YES  [ ] NO    LABS:  07-24    142  |  104  |  8   ----------------------------<  97  3.5   |  27  |  0.96    Ca    9.4      24 Jul 2020 20:45    TPro  7.0  /  Alb  2.8<L>  /  TBili  0.9  /  DBili  x   /  AST  61<H>  /  ALT  29  /  AlkPhos  53  07-24                          13.8   9.25  )-----------( 308      ( 24 Jul 2020 19:30 )             42.2       Culture - Blood (collected 07-22-20 @ 15:45)  Source: .Blood Blood-Peripheral  Preliminary Report (07-23-20 @ 23:01):    No growth to date.    Culture - Blood (collected 07-22-20 @ 15:45)  Source: .Blood Blood-Peripheral  Preliminary Report (07-23-20 @ 23:01):    No growth to date.      Thyroid Stimulating Hormone, Serum: 1.43 uIU/mL (07-23 @ 05:30)      LIVER FUNCTIONS - ( 24 Jul 2020 19:30 )  Alb: 2.8 g/dL / Pro: 7.0 g/dL / ALK PHOS: 53 U/L / ALT: 29 U/L / AST: 61 U/L / GGT: x           CARDIAC MARKERS ( 24 Jul 2020 19:30 )  .021 ng/mL / x     / x     / x     / x                       13.8   9.25  )-----------( 308      ( 07-24 @ 19:30 )             42.2                13.5   8.71  )-----------( 342      ( 07-24 @ 07:00 )             40.6                12.0   7.86  )-----------( 220      ( 07-23 @ 05:30 )             37.3                13.0   10.87 )-----------( 252      ( 07-22 @ 15:45 )             39.5               RADIOLOGY & ADDITIONAL TESTS:    Imaging Personally Reviewed:  [ ] YES  [ ] NO  acetaminophen   Tablet .. 650 milliGRAM(s) Oral every 6 hours PRN  donepezil 5 milliGRAM(s) Oral at bedtime  enoxaparin Injectable 40 milliGRAM(s) SubCutaneous daily  levothyroxine 75 MICROGram(s) Oral daily  memantine 10 milliGRAM(s) Oral two times a day  pantoprazole    Tablet 40 milliGRAM(s) Oral before breakfast      HEALTH ISSUES - PROBLEM Dx:
Patient is a 89y old  Female who presents with a chief complaint of slurred speech (26 Jul 2020 10:19)      INTERVAL HPI/OVERNIGHT EVENTS: alert and calm        REVIEW OF SYSTEMS:  CONSTITUTIONAL: No fever, weight loss, or fatigue  EYES: No eye pain, visual disturbances, or discharge  ENMT:  No difficulty hearing, tinnitus, vertigo; No sinus or throat pain  NECK: No pain or stiffness  BREASTS: No pain, masses, or nipple discharge  RESPIRATORY: No cough, wheezing, chills or hemoptysis; No shortness of breath  CARDIOVASCULAR: No chest pain, palpitations, dizziness, or leg swelling  GASTROINTESTINAL: No abdominal or epigastric pain. No nausea, vomiting, or hematemesis; No diarrhea or constipation. No melena or hematochezia.  GENITOURINARY: No dysuria, frequency, hematuria, or incontinence  NEUROLOGICAL: No headaches, memory loss, loss of strength, numbness, or tremors  SKIN: No itching, burning, rashes, or lesions   LYMPH NODES: No enlarged glands  ENDOCRINE: No heat or cold intolerance; No hair loss  MUSCULOSKELETAL: No joint pain or swelling; No muscle, back, or extremity pain  PSYCHIATRIC: No depression, anxiety, mood swings, or difficulty sleeping  HEME/LYMPH: No easy bruising, or bleeding gums  ALLERY AND IMMUNOLOGIC: No hives or eczema  FAMILY HISTORY:    T(C): 36.4 (07-27-20 @ 05:48), Max: 36.6 (07-26-20 @ 15:39)  HR: 78 (07-27-20 @ 05:48) (78 - 89)  BP: 141/71 (07-27-20 @ 05:48) (110/62 - 141/71)  RR: 16 (07-27-20 @ 05:48) (15 - 16)  SpO2: 97% (07-27-20 @ 05:48) (94% - 97%)  Wt(kg): --Vital Signs Last 24 Hrs  T(C): 36.4 (27 Jul 2020 05:48), Max: 36.6 (26 Jul 2020 15:39)  T(F): 97.6 (27 Jul 2020 05:48), Max: 97.8 (26 Jul 2020 15:39)  HR: 78 (27 Jul 2020 05:48) (78 - 89)  BP: 141/71 (27 Jul 2020 05:48) (110/62 - 141/71)  BP(mean): --  RR: 16 (27 Jul 2020 05:48) (15 - 16)  SpO2: 97% (27 Jul 2020 05:48) (94% - 97%)    T(F): 97.6 (07-27-20 @ 05:48), Max: 98.6 (07-24-20 @ 22:28)  HR: 78 (07-27-20 @ 05:48) (74 - 96)  BP: 141/71 (07-27-20 @ 05:48) (97/59 - 141/71)  RR: 16 (07-27-20 @ 05:48) (15 - 23)  SpO2: 97% (07-27-20 @ 05:48) (94% - 99%)    PHYSICAL EXAM:  GENERAL: NAD, well-groomed, well-developed  HEAD:  Atraumatic, Normocephalic  EYES: EOMI, PERRLA, conjunctiva and sclera clear  ENMT: No tonsillar erythema, exudates, or enlargement; Moist mucous membranes, Good dentition, No lesions  NECK: Supple, No JVD, Normal thyroid  NERVOUS SYSTEM:  Alert & Oriented X3, Good concentration; Motor Strength 5/5 B/L upper and lower extremities; DTRs 2+ intact and symmetric  CHEST/LUNG: Clear to percussion bilaterally; No rales, rhonchi, wheezing, or rubs  HEART: Regular rate and rhythm; No murmurs, rubs, or gallops  ABDOMEN: Soft, Nontender, Nondistended; Bowel sounds present  EXTREMITIES:  2+ Peripheral Pulses, No clubbing, cyanosis, or edema  LYMPH: No lymphadenopathy noted  SKIN: No rashes or lesions    Consultant(s) Notes Reviewed:  [x ] YES  [ ] NO  Care Discussed with Consultants/Other Providers [ x] YES  [ ] NO    LABS:  07-26    143  |  106  |  10  ----------------------------<  119<H>  2.9<LL>   |  26  |  0.90    Ca    9.2      26 Jul 2020 06:28                            13.8   7.04  )-----------( 253      ( 26 Jul 2020 06:28 )             43.9       Thyroid Stimulating Hormone, Serum: 1.43 uIU/mL (07-23 @ 05:30)                         13.8   7.04  )-----------( 253      ( 07-26 @ 06:28 )             43.9                13.9   7.41  )-----------( 222      ( 07-25 @ 08:44 )             43.7                13.8   9.25  )-----------( 308      ( 07-24 @ 19:30 )             42.2                13.5   8.71  )-----------( 342      ( 07-24 @ 07:00 )             40.6                12.0   7.86  )-----------( 220      ( 07-23 @ 05:30 )             37.3                13.0   10.87 )-----------( 252      ( 07-22 @ 15:45 )             39.5               RADIOLOGY & ADDITIONAL TESTS:    Imaging Personally Reviewed:  [ ] YES  [ ] NO  acetaminophen   Tablet .. 650 milliGRAM(s) Oral every 6 hours PRN  donepezil 5 milliGRAM(s) Oral at bedtime  enoxaparin Injectable 40 milliGRAM(s) SubCutaneous daily  levothyroxine 75 MICROGram(s) Oral daily  memantine 10 milliGRAM(s) Oral two times a day  pantoprazole    Tablet 40 milliGRAM(s) Oral before breakfast  potassium chloride    Tablet ER 20 milliEquivalent(s) Oral daily  sodium chloride 0.9%. 1000 milliLiter(s) IV Continuous <Continuous>      HEALTH ISSUES - PROBLEM Dx:  Dementia: Dementia  Generalized weakness: Generalized weakness
Patient is a 89y old  Female who presents with a chief complaint of slurred speech (27 Jul 2020 13:37)      INTERVAL HPI/OVERNIGHT EVENTS: resting comfortably      REVIEW OF SYSTEMS:  CONSTITUTIONAL: No fever, weight loss, or fatigue  EYES: No eye pain, visual disturbances, or discharge  ENMT:  No difficulty hearing, tinnitus, vertigo; No sinus or throat pain  NECK: No pain or stiffness  BREASTS: No pain, masses, or nipple discharge  RESPIRATORY: No cough, wheezing, chills or hemoptysis; No shortness of breath  CARDIOVASCULAR: No chest pain, palpitations, dizziness, or leg swelling  GASTROINTESTINAL: No abdominal or epigastric pain. No nausea, vomiting, or hematemesis; No diarrhea or constipation. No melena or hematochezia.  GENITOURINARY: No dysuria, frequency, hematuria, or incontinence  NEUROLOGICAL: No headaches, memory loss, loss of strength, numbness, or tremors  SKIN: No itching, burning, rashes, or lesions   LYMPH NODES: No enlarged glands  ENDOCRINE: No heat or cold intolerance; No hair loss  MUSCULOSKELETAL: No joint pain or swelling; No muscle, back, or extremity pain  PSYCHIATRIC: No depression, anxiety, mood swings, or difficulty sleeping  HEME/LYMPH: No easy bruising, or bleeding gums  ALLERY AND IMMUNOLOGIC: No hives or eczema  FAMILY HISTORY:    T(C): 36.8 (07-28-20 @ 05:32), Max: 36.8 (07-28-20 @ 05:32)  HR: 81 (07-28-20 @ 05:32) (81 - 90)  BP: 142/67 (07-28-20 @ 05:32) (98/50 - 142/67)  RR: 16 (07-28-20 @ 05:32) (16 - 16)  SpO2: 97% (07-28-20 @ 05:32) (96% - 100%)  Wt(kg): --Vital Signs Last 24 Hrs  T(C): 36.8 (28 Jul 2020 05:32), Max: 36.8 (28 Jul 2020 05:32)  T(F): 98.2 (28 Jul 2020 05:32), Max: 98.2 (28 Jul 2020 05:32)  HR: 81 (28 Jul 2020 05:32) (81 - 90)  BP: 142/67 (28 Jul 2020 05:32) (98/50 - 142/67)  BP(mean): --  RR: 16 (28 Jul 2020 05:32) (16 - 16)  SpO2: 97% (28 Jul 2020 05:32) (96% - 100%)    T(F): 98.2 (07-28-20 @ 05:32), Max: 98.2 (07-28-20 @ 05:32)  HR: 81 (07-28-20 @ 05:32) (74 - 90)  BP: 142/67 (07-28-20 @ 05:32) (98/50 - 142/67)  RR: 16 (07-28-20 @ 05:32) (15 - 16)  SpO2: 97% (07-28-20 @ 05:32) (94% - 100%)    PHYSICAL EXAM:  GENERAL: NAD, well-groomed, well-developed  HEAD:  Atraumatic, Normocephalic  EYES: EOMI, PERRLA, conjunctiva and sclera clear  ENMT: No tonsillar erythema, exudates, or enlargement; Moist mucous membranes, Good dentition, No lesions  NECK: Supple, No JVD, Normal thyroid  NERVOUS SYSTEM:  Alert & Oriented X3, Good concentration; Motor Strength 5/5 B/L upper and lower extremities; DTRs 2+ intact and symmetric  CHEST/LUNG: Clear to percussion bilaterally; No rales, rhonchi, wheezing, or rubs  HEART: Regular rate and rhythm; No murmurs, rubs, or gallops  ABDOMEN: Soft, Nontender, Nondistended; Bowel sounds present  EXTREMITIES:  2+ Peripheral Pulses, No clubbing, cyanosis, or edema  LYMPH: No lymphadenopathy noted  SKIN: No rashes or lesions    Consultant(s) Notes Reviewed:  [x ] YES  [ ] NO  Care Discussed with Consultants/Other Providers [ x] YES  [ ] NO    LABS:  07-28    143  |  107  |  18  ----------------------------<  95  3.8   |  27  |  0.87    Ca    9.7      28 Jul 2020 05:30                            13.0   7.24  )-----------( 359      ( 28 Jul 2020 05:30 )             40.9       Thyroid Stimulating Hormone, Serum: 1.43 uIU/mL (07-23 @ 05:30)                         13.0   7.24  )-----------( 359      ( 07-28 @ 05:30 )             40.9                13.8   7.04  )-----------( 253      ( 07-26 @ 06:28 )             43.9                13.9   7.41  )-----------( 222      ( 07-25 @ 08:44 )             43.7                13.8   9.25  )-----------( 308      ( 07-24 @ 19:30 )             42.2                13.5   8.71  )-----------( 342      ( 07-24 @ 07:00 )             40.6                12.0   7.86  )-----------( 220      ( 07-23 @ 05:30 )             37.3                13.0   10.87 )-----------( 252      ( 07-22 @ 15:45 )             39.5               RADIOLOGY & ADDITIONAL TESTS:    Imaging Personally Reviewed:  [ ] YES  [ ] NO  acetaminophen   Tablet .. 650 milliGRAM(s) Oral every 6 hours PRN  donepezil 5 milliGRAM(s) Oral at bedtime  enoxaparin Injectable 40 milliGRAM(s) SubCutaneous daily  levothyroxine 75 MICROGram(s) Oral daily  memantine 10 milliGRAM(s) Oral two times a day  pantoprazole    Tablet 40 milliGRAM(s) Oral before breakfast  potassium chloride    Tablet ER 20 milliEquivalent(s) Oral daily  sodium chloride 0.9%. 1000 milliLiter(s) IV Continuous <Continuous>      HEALTH ISSUES - PROBLEM Dx:  Colon cancer: Colon cancer  Lung cancer: Lung cancer  Dementia: Dementia  Generalized weakness: Generalized weakness
Progress: no new events reported, looks comfortable today     Present Symptoms:   Dyspnea:   Nausea/Vomiting:   Anxiety:    Depressed Mood:   Fatigue:   Loss of appetite:   Pain:                   location:   Review of Systems: [ Unable to obtain due to poor mentation]    MEDICATIONS  (STANDING):  donepezil 5 milliGRAM(s) Oral at bedtime  enoxaparin Injectable 40 milliGRAM(s) SubCutaneous daily  levothyroxine 75 MICROGram(s) Oral daily  memantine 10 milliGRAM(s) Oral two times a day  pantoprazole    Tablet 40 milliGRAM(s) Oral before breakfast  potassium chloride    Tablet ER 20 milliEquivalent(s) Oral daily  sodium chloride 0.9%. 1000 milliLiter(s) (75 mL/Hr) IV Continuous <Continuous>    MEDICATIONS  (PRN):  acetaminophen   Tablet .. 650 milliGRAM(s) Oral every 6 hours PRN Temp greater or equal to 38C (100.4F), Mild Pain (1 - 3)      PHYSICAL EXAM:  Vital Signs Last 24 Hrs  T(C): 36.8 (28 Jul 2020 05:32), Max: 36.8 (28 Jul 2020 05:32)  T(F): 98.2 (28 Jul 2020 05:32), Max: 98.2 (28 Jul 2020 05:32)  HR: 81 (28 Jul 2020 05:32) (81 - 90)  BP: 142/67 (28 Jul 2020 05:32) (98/50 - 142/67)  BP(mean): --  RR: 16 (28 Jul 2020 05:32) (16 - 16)  SpO2: 97% (28 Jul 2020 05:32) (96% - 100%)  General: alert, confused       HEENT: normal    Lungs: comfortable   CV: s1s2 reg    GI: abd soft, wnl      Musculoskeletal: normal , weakened   Skin: normal , w/d    Neuro: forgetful, confused    Oral intake ability:  oral feeding  Diet: as zeeshan      LABS:                          13.0   7.24  )-----------( 359      ( 28 Jul 2020 05:30 )             40.9     07-28    143  |  107  |  18  ----------------------------<  95  3.8   |  27  |  0.87    Ca    9.7      28 Jul 2020 05:30          RADIOLOGY & ADDITIONAL STUDIES:     ADVANCE DIRECTIVES:  Advanced Care Planning discussion total time spent:
Patient is a 89y old  Female who presents with a chief complaint of slurred speech (25 Jul 2020 08:34)      INTERVAL HPI/OVERNIGHT EVENTS: alert       REVIEW OF SYSTEMS:  CONSTITUTIONAL: No fever, weight loss, or fatigue  EYES: No eye pain, visual disturbances, or discharge  ENMT:  No difficulty hearing, tinnitus, vertigo; No sinus or throat pain  NECK: No pain or stiffness  BREASTS: No pain, masses, or nipple discharge  RESPIRATORY: No cough, wheezing, chills or hemoptysis; No shortness of breath  CARDIOVASCULAR: No chest pain, palpitations, dizziness, or leg swelling  GASTROINTESTINAL: No abdominal or epigastric pain. No nausea, vomiting, or hematemesis; No diarrhea or constipation. No melena or hematochezia.  GENITOURINARY: No dysuria, frequency, hematuria, or incontinence  NEUROLOGICAL: No headaches, memory loss, loss of strength, numbness, or tremors  SKIN: No itching, burning, rashes, or lesions   LYMPH NODES: No enlarged glands  ENDOCRINE: No heat or cold intolerance; No hair loss  MUSCULOSKELETAL: No joint pain or swelling; No muscle, back, or extremity pain  PSYCHIATRIC: No depression, anxiety, mood swings, or difficulty sleeping  HEME/LYMPH: No easy bruising, or bleeding gums  ALLERY AND IMMUNOLOGIC: No hives or eczema  FAMILY HISTORY:    T(C): 36.3 (07-26-20 @ 06:05), Max: 36.5 (07-25-20 @ 15:54)  HR: 74 (07-26-20 @ 06:05) (74 - 86)  BP: 127/63 (07-26-20 @ 06:05) (106/61 - 127/63)  RR: 16 (07-26-20 @ 06:05) (16 - 16)  SpO2: 98% (07-26-20 @ 06:05) (97% - 98%)  Wt(kg): --Vital Signs Last 24 Hrs  T(C): 36.3 (26 Jul 2020 06:05), Max: 36.5 (25 Jul 2020 15:54)  T(F): 97.4 (26 Jul 2020 06:05), Max: 97.7 (25 Jul 2020 15:54)  HR: 74 (26 Jul 2020 06:05) (74 - 86)  BP: 127/63 (26 Jul 2020 06:05) (106/61 - 127/63)  BP(mean): --  RR: 16 (26 Jul 2020 06:05) (16 - 16)  SpO2: 98% (26 Jul 2020 06:05) (97% - 98%)    T(F): 97.4 (07-26-20 @ 06:05), Max: 98.6 (07-24-20 @ 22:28)  HR: 74 (07-26-20 @ 06:05) (74 - 99)  BP: 127/63 (07-26-20 @ 06:05) (97/59 - 151/76)  RR: 16 (07-26-20 @ 06:05) (16 - 23)  SpO2: 98% (07-26-20 @ 06:05) (95% - 99%)    PHYSICAL EXAM:  GENERAL: NAD, well-groomed, well-developed  HEAD:  Atraumatic, Normocephalic  EYES: EOMI, PERRLA, conjunctiva and sclera clear  ENMT: No tonsillar erythema, exudates, or enlargement; Moist mucous membranes, Good dentition, No lesions  NECK: Supple, No JVD, Normal thyroid  NERVOUS SYSTEM:  Alert & Oriented X3, Good concentration; Motor Strength 5/5 B/L upper and lower extremities; DTRs 2+ intact and symmetric  CHEST/LUNG: Clear to percussion bilaterally; No rales, rhonchi, wheezing, or rubs  HEART: Regular rate and rhythm; No murmurs, rubs, or gallops  ABDOMEN: Soft, Nontender, Nondistended; Bowel sounds present  EXTREMITIES:  2+ Peripheral Pulses, No clubbing, cyanosis, or edema  LYMPH: No lymphadenopathy noted  SKIN: No rashes or lesions    Consultant(s) Notes Reviewed:  [x ] YES  [ ] NO  Care Discussed with Consultants/Other Providers [ x] YES  [ ] NO    LABS:  07-26    143  |  106  |  10  ----------------------------<  119<H>  2.9<LL>   |  26  |  0.90    Ca    9.2      26 Jul 2020 06:28  Mg     1.8     07-25    TPro  7.0  /  Alb  2.8<L>  /  TBili  0.9  /  DBili  x   /  AST  61<H>  /  ALT  29  /  AlkPhos  53  07-24                          13.8   7.04  )-----------( 253      ( 26 Jul 2020 06:28 )             43.9       Thyroid Stimulating Hormone, Serum: 1.43 uIU/mL (07-23 @ 05:30)      LIVER FUNCTIONS - ( 24 Jul 2020 19:30 )  Alb: 2.8 g/dL / Pro: 7.0 g/dL / ALK PHOS: 53 U/L / ALT: 29 U/L / AST: 61 U/L / GGT: x           CARDIAC MARKERS ( 24 Jul 2020 19:30 )  .021 ng/mL / x     / x     / x     / x                       13.8   7.04  )-----------( 253      ( 07-26 @ 06:28 )             43.9                13.9   7.41  )-----------( 222      ( 07-25 @ 08:44 )             43.7                13.8   9.25  )-----------( 308      ( 07-24 @ 19:30 )             42.2                13.5   8.71  )-----------( 342      ( 07-24 @ 07:00 )             40.6                12.0   7.86  )-----------( 220      ( 07-23 @ 05:30 )             37.3                13.0   10.87 )-----------( 252      ( 07-22 @ 15:45 )             39.5               RADIOLOGY & ADDITIONAL TESTS:    Imaging Personally Reviewed:  [ ] YES  [ ] NO  acetaminophen   Tablet .. 650 milliGRAM(s) Oral every 6 hours PRN  donepezil 5 milliGRAM(s) Oral at bedtime  enoxaparin Injectable 40 milliGRAM(s) SubCutaneous daily  levothyroxine 75 MICROGram(s) Oral daily  memantine 10 milliGRAM(s) Oral two times a day  pantoprazole    Tablet 40 milliGRAM(s) Oral before breakfast  potassium chloride    Tablet ER 40 milliEquivalent(s) Oral every 4 hours      HEALTH ISSUES - PROBLEM Dx:  Dementia: Dementia  Generalized weakness: Generalized weakness

## 2020-07-28 NOTE — CHART NOTE - NSCHARTNOTEFT_GEN_A_CORE
Reviewed prior progress notes, labs and imaging.    Discussed with Dr Dubose    Primary Diagnosis  Slurred speech   89F hx of advanced dementia, hypothyroid, LBBB, hx of colon ca s/p partial colectomy, hx of lung ca s/p cyberknife 2 years ago with questionable recurrent lung ca/colon ca (+CT lung nodule and ?colonic mass and elevated CEA) declined any further workup by family sec to advanced age and dementia, admitted 7/22 for diarrhea with stercoral colitis and discharged yesterday, returned to hospital a few hours later because patient was lethargic and confused. Son had called PMD who advised return to ED to r/o CVA. In ED, hemodynamically stable, more alert but still weak but neurologically intact. CT head neg for acute event.  Upon chart review, pt had been given Zyprexa 2.5mg IM and Haldol 2mg x2 IM overnight sec to agitation - suspect symptoms related to medication.     Plan: Home with hospice care    #Dementia, suspect acute hypoactive delirium 2/2 hospitalization and sedating medications   - Supportive care  - continue home meds   - PT consult  - Family requesting [facility]  - Mental status is improved, now tolerating PO    #Hypokalemia  - Will replete now and start daily supplement. Reviewed prior progress notes, labs and imaging.    Discussed with Dr Dubose    Primary Diagnosis  Slurred speech   89F hx of advanced dementia, hypothyroid, LBBB, hx of colon ca s/p partial colectomy, hx of lung ca s/p cyberknife 2 years ago with questionable recurrent lung ca/colon ca (+CT lung nodule and ?colonic mass and elevated CEA) declined any further workup by family sec to advanced age and dementia, admitted 7/22 for diarrhea with stercoral colitis and discharged yesterday, returned to hospital a few hours later because patient was lethargic and confused. Son had called PMD who advised return to ED to r/o CVA. In ED, hemodynamically stable, more alert but still weak but neurologically intact. CT head neg for acute event.  Upon chart review, pt had been given Zyprexa 2.5mg IM and Haldol 2mg x2 IM overnight sec to agitation - suspect symptoms related to medication.     Plan: Home with hospice care    #Dementia, suspect acute hypoactive delirium 2/2 hospitalization and sedating medications   - Supportive care  - continue home meds   - PT consult  - Family requesting [facility]  - Mental status is improved, now tolerating PO    #Hypokalemia- resolved   - daily supplement.    plan anticipate discharge home with home hospice today Reviewed prior progress notes, labs and imaging.    Discussed with Dr Dubose    Primary Diagnosis  Slurred speech   89F hx of advanced dementia, hypothyroid, LBBB, hx of colon ca s/p partial colectomy, hx of lung ca s/p cyberknife 2 years ago with questionable recurrent lung ca/colon ca (+CT lung nodule and ?colonic mass and elevated CEA) declined any further workup by family sec to advanced age and dementia, admitted 7/22 for diarrhea with stercoral colitis and discharged yesterday, returned to hospital a few hours later because patient was lethargic and confused. Son had called PMD who advised return to ED to r/o CVA. In ED, hemodynamically stable, more alert but still weak but neurologically intact. CT head neg for acute event.  Upon chart review, pt had been given Zyprexa 2.5mg IM and Haldol 2mg x2 IM overnight sec to agitation - suspect symptoms related to medication.     Plan: Home with hospice care    #Dementia, suspect acute hypoactive delirium 2/2 hospitalization and sedating medications   - Supportive care  - continue home meds   - PT consult  - Family requesting home with home hospice   - Mental status is improved, tolerating PO    #Hypokalemia- resolved   - daily supplement.    plan anticipate discharge home with home hospice today-- discussed with Dr Dubose

## 2020-07-28 NOTE — PROGRESS NOTE ADULT - PROBLEM SELECTOR PLAN 1
SUPPORTIVE CARE
end stage dementia /hospice care home requested
stable on present medications
physical therapy

## 2020-07-28 NOTE — PROGRESS NOTE ADULT - ASSESSMENT
A/P 89F hx of advanced dementia, hypothyroid, LBBB, hx of colon ca s/p partial colectomy, hx of lung ca s/p cyberknife 2 years ago with questionable recurrent lung ca/colon ca (+CT lung nodule and ?colonic mass and elevated CEA) declined any further workup by family sec to advanced age and dementia admitted 7/22 for diarrhea with stercoral colitis and readmitted next day for AMS, excess sedation likely sec to haldol/zyprexa.    AMS  Head CT negative  Sxs resolved. AVOID any sedative/antipsychotics  PT evaluation in AM. Baseline pt is ambulatory.   SW evaluation - pt lives with 92y/o      #Diarrhea/hypokalemia/stercoral colitis  monitor for additional sxs.   K normal now.     # Hypothyroid  cont synthroid    #Dementia  cont donepezil/namenda    #DVT/GI proph    Palliative : asked for goc/hospice . Pt was approved for home hospice services w/ [facility] today .  No return call received  from son Emmanuel to discuss molst.   Per cm, pt will be discharged home today w/ hospice services.
accepted to home hospice arrangements being made
altered mental status secondary to behavioral medICATIONS ...ting AS PER FAMILY
long discussion with family do not want [facility] as no visitation...asking for HOME HOSPICE
stable ...discussed with .....[facility] vs home  i favor  subacute

## 2020-07-28 NOTE — DISCHARGE NOTE NURSING/CASE MANAGEMENT/SOCIAL WORK - PATIENT PORTAL LINK FT
You can access the FollowMyHealth Patient Portal offered by [facility] by registering at the following website: [email or web address]. By joining [email or web address]’s FollowMyHealth portal, you will also be able to view your health information using other applications (apps) compatible with our system.

## 2020-08-10 NOTE — CHART NOTE - NSCHARTNOTEFT_GEN_A_CORE
patient had acute encephalopathy due to medications...and recently with decreased appetite which has improved off of certain behavioral medications

## 2020-08-13 NOTE — CHART NOTE - NSCHARTNOTEFT_GEN_A_CORE
agree with dietician that she has severe protein malnutrition which is not helping her strength gait and cognitive functioning DR MISHA KRUSE

## 2020-08-28 NOTE — ED ADULT TRIAGE NOTE - HEART RATE (BEATS/MIN)
"Subjective:       Patient ID: Regine Osorio is a 63 y.o. female.    Chief Complaint: Sleeping Problem    HPI     I had the pleasure of seeing Regine Osorio today, who is a 63 y.o. female that presents with insomnia.    Regine Osorio does not have a CDL.    Regine Osorio is not a shift worker.    Regine Osorio presents with insomnia that has been going on for "all my life".   Patient had overnight study about 10 years ago that was reportedly normal.   I do not have copy of this study.   She had home study a few years ago also reportedly normal.      Bedtime when working ranges from NA to NA.   When not working, bedtime ranges from 1030 to 2300.   Sleep latency ranges from 2 to 3 hours.  Patient turns on TV prior to sleep onset, or reads.   Average number of awakenings is 3-4 and return to sleep is variable.   Wake up time when working is NA to NA.   When not working, wake up time is 0530 to 0600.   Patient does feel rested upon awakening.    Regine Osorio consumes approximately 1 beverages with caffeine daily.   An average of 0 beverages with alcohol are consumed    Medications taken for sleep currently: none  Previous medications taken: none     Regine Osorio does experience daytime sleepiness.   Naps are taken about 0 times weekly, usually lasting NA to NA minutes.  Regine currently does operate an automobile.  Regine Osorio does not experience drowsiness when driving.   Patient does doze off when sedentary.   Regine Osorio does not have auxiliary symptoms of narcolepsy including sleep onset paralysis, hypnagogic hallucinations, sleep attacks and cataplexy    EPWORTH SLEEPINESS SCALE 8/25/2020   Sitting and reading 3   Watching TV 3   Sitting, inactive in a public place (e.g. a theatre or a meeting) 3   As a passenger in a car for an hour without a break 3   Lying down to rest in the afternoon when circumstances permit 2   Sitting and talking to someone 1   Sitting quietly after a lunch " without alcohol 2   In a car, while stopped for a few minutes in traffic 1   Total score 18       Regine Osorio has a history of snoring.   Snoring is described as mild and intermittent.   Apneic episodes have not been noticed during sleep.   A witness to sleep is not present.   The patient awakens with mouth dryness and headaches.      Regine Osorio does not have symptoms of Restless Legs Syndrome. Nocturnal leg movements have not been noticed.   The patient does not experience sleep related leg cramps.   There is not a history of parasomnia   Current Outpatient Medications:     aspirin 81 MG Chew, Take 81 mg by mouth once daily. , Disp: , Rfl:     azelastine (ASTELIN) 137 mcg (0.1 %) nasal spray, 1 SPRAY IEN BID, Disp: , Rfl:     blood sugar diagnostic Strp, To check BG 2 times daily, to use with insurance preferred meter, Disp: 200 strip, Rfl: 3    blood sugar diagnostic Strp, To check BG 2 times daily, to use with insurance preferred meter, Disp: 200 each, Rfl: 3    blood-glucose meter kit, To check BG 2 times daily, to use with insurance preferred meter, Disp: 1 each, Rfl: 0    cholecalciferol, vitamin D3, (VITAMIN D3) 2,000 unit Cap, Take 1 capsule by mouth once daily. , Disp: , Rfl:     clobetasol (CLOBEX) 0.05 % shampoo, Apply topically once a week., Disp: 118 mL, Rfl: 5    cyanocobalamin/cobamamide (B12 SL), Place 3,000 mcg under the tongue once daily., Disp: , Rfl:     fluocinolone acetonide oil 0.01 % Drop, as needed. , Disp: , Rfl:     gabapentin (NEURONTIN) 600 MG tablet, TAKE 1 TABLET BY MOUTH THREE TIMES DAILY, Disp: 90 tablet, Rfl: 0    glipiZIDE (GLUCOTROL) 5 MG TR24, TAKE 1 TABLET(5 MG) BY MOUTH TWICE DAILY, Disp: 60 tablet, Rfl: 5    hydroCHLOROthiazide (HYDRODIURIL) 25 MG tablet, TAKE 1 TABLET(25 MG) BY MOUTH EVERY DAY, Disp: 30 tablet, Rfl: 5    losartan (COZAAR) 50 MG tablet, TAKE 1 TABLET BY MOUTH EVERY DAY, Disp: 90 tablet, Rfl: 0    metFORMIN (GLUCOPHAGE) 1000 MG tablet,  TAKE 1 TABLET(1000 MG) BY MOUTH TWICE DAILY WITH MEALS, Disp: 60 tablet, Rfl: 2    naproxen sodium (ANAPROX) 550 MG tablet, Take 1 tablet (550 mg total) by mouth 2 (two) times daily as needed., Disp: 40 tablet, Rfl: 5    ondansetron (ZOFRAN-ODT) 8 MG TbDL, Take 1 tablet (8 mg total) by mouth every 8 (eight) hours as needed., Disp: 30 tablet, Rfl: 2    ONETOUCH DELICA LANC DEVICE Kit, USE TO TEST BLOOD SUGAR THREE TIMES DAILY, Disp: 1 each, Rfl: 0    ONETOUCH DELICA PLUS LANCET 33 gauge Misc, USE TO TEST BID, Disp: , Rfl: 3    pantoprazole (PROTONIX) 20 MG tablet, Take 1 tablet (20 mg total) by mouth once daily., Disp: 30 tablet, Rfl: 2    sumatriptan (IMITREX) 100 MG tablet, Take 1 tab at onset of migraine, may repeat dose in 2 hours if needed. Max 2 tabs/day. Max 3 days/week., Disp: 9 tablet, Rfl: 11     Review of patient's allergies indicates:   Allergen Reactions    Codeine Shortness Of Breath     Pt states she had a reaction as a teenager and was taken to the ER.    Lisinopril      cough    Nortriptyline      sleepy         Past Medical History:   Diagnosis Date    Breast cancer 2017    Cataract     Diabetes mellitus, type 2     Type 2    Fibromyalgia     Glaucoma     Hypertension     Renal cyst, right 02/06/2020    Steatosis of liver 02/06/2020       Past Surgical History:   Procedure Laterality Date    BREAST BIOPSY      BREAST SURGERY      breast ca lt side     HYSTERECTOMY      supacervical hysterectomy    LAPAROSCOPIC SUPRACERVICAL HYSTERECTOMY  2005    fibroids    MASTECTOMY Left 2017    chemo    MYOMECTOMY      TONSILLECTOMY      VAGINAL DELIVERY         Family History   Problem Relation Age of Onset    Dementia Mother     Glaucoma Mother     Bipolar disorder Mother     Lung cancer Father     Bipolar disorder Son     Post-traumatic stress disorder Son     Breast cancer Maternal Aunt     Bipolar disorder Maternal Aunt     Glaucoma Maternal Uncle     Blindness Maternal  Grandfather     Glaucoma Maternal Grandfather     Breast cancer Cousin 39        paternal     Amblyopia Neg Hx     Cataracts Neg Hx     Macular degeneration Neg Hx     Retinal detachment Neg Hx     Strabismus Neg Hx     Cancer Neg Hx     Colon cancer Neg Hx     Ovarian cancer Neg Hx        Social History     Socioeconomic History    Marital status:      Spouse name: Not on file    Number of children: 1    Years of education: Not on file    Highest education level: Not on file   Occupational History    Not on file   Social Needs    Financial resource strain: Somewhat hard    Food insecurity     Worry: Not on file     Inability: Never true    Transportation needs     Medical: No     Non-medical: No   Tobacco Use    Smoking status: Never Smoker    Smokeless tobacco: Never Used   Substance and Sexual Activity    Alcohol use: No     Frequency: Never    Drug use: No    Sexual activity: Not Currently   Lifestyle    Physical activity     Days per week: 1 day     Minutes per session: 20 min    Stress: To some extent   Relationships    Social connections     Talks on phone: More than three times a week     Gets together: Once a week     Attends Ca[employer]ism service: Not on file     Active member of club or organization: Yes     Attends meetings of clubs or organizations: More than 4 times per year     Relationship status:    Other Topics Concern    Patient feels they ought to cut down on drinking/drug use Not Asked    Patient annoyed by others criticizing their drinking/drug use Not Asked    Patient has felt bad or guilty about drinking/drug use Not Asked    Patient has had a drink/used drugs as an eye opener in the AM Not Asked   Social History Narrative        1 son (estranged)    3 grandchildren ([place])    Born and raised in [place] (moved to [facility])           Old medical records.    There were no vitals filed for this visit.           The patient was given open  opportunity to ask questions and/or express concerns about treatment plan.   All questions/concerns were discussed.   Driving precautions were provided.     Two patient identifiers used prior to evaluation.    Thank you for referring Regine Osorio for evaluation.           Review of Systems      Objective:      Physical Exam    Assessment:       1. Insomnia due to medical condition    2. Insomnia, unspecified type    3. Fibromyalgia    4. Type 2 diabetes mellitus with hyperglycemia, without long-term current use of insulin    5. Essential hypertension    6. KENDRA (obstructive sleep apnea)        Plan:       Due to listed symptoms, a polysomnogram is recommended and ordered.   Description of procedure given to patient.   If significant Obstructive Sleep Apnea (KENDRA) is found during the initial portion of the study, therapy will be initiated with nasal Continuous Positive Airway Pressure (CPAP).   Goals of therapy were discussed, alternative treatments listed and patient agrees to this form of therapy if indicated.   The pathophysiology of KENDRA was discussed.   The effects of KENDRA on patient's co-morbid conditions and the increased morbidity and/or mortality associated with this condition were reviewed.   The patient was given open opportunity to ask questions and/or express concerns about treatment plan.   All questions/concerns were discussed.   Driving precautions were provided.       Sleep hygiene issues were discussed.   Stimulus control techniques were recommended and reviewed.       Thank you for referring Regine Osorio for evaluation.         The patient location is: home  The chief complaint leading to consultation is: insomnia    Visit type: audiovisual    Face to Face time with patient: 18  31 minutes of total time spent on the encounter, which includes face to face time and non-face to face time preparing to see the patient (eg, review of tests), Obtaining and/or reviewing separately obtained history,  Documenting clinical information in the electronic or other health record, Independently interpreting results (not separately reported) and communicating results to the patient/family/caregiver, or Care coordination (not separately reported).         Each patient to whom he or she provides medical services by telemedicine is:  (1) informed of the relationship between the physician and patient and the respective role of any other health care provider with respect to management of the patient; and (2) notified that he or she may decline to receive medical services by telemedicine and may withdraw from such care at any time.    Notes:         72

## 2020-09-01 PROBLEM — Z23 INFLUENZA VACCINE ADMINISTERED: Status: ACTIVE | Noted: 2020-01-01

## 2020-11-14 NOTE — ED PROVIDER NOTE - CLINICAL SUMMARY MEDICAL DECISION MAKING FREE TEXT BOX
89F hx of advanced dementia, hypothyroid, LBBB, hx of colon ca s/p partial colectomy, hx of lung ca s/p cyberknife 2 years ago with questionable recurrent lung ca/colon ca (+CT lung nodule and ?colonic mass and elevated CEA) declined any further workup by family secondary to advanced age and dementia. Pt was d/c to home with home hospice earlier this year around July. Pt stopped eating 4 days ago. She had worsening SOB today and son notes calling [facility] where he was told they would see her in the morning. He says he felt ill prepared - he didn't have oxygen or pain medicine to assist in her comfort and she was in respiratory distress. He understands that she is dying. She has a nonhospital DNR form, which he couldn't find at the time EMS was there. EMS was bagging the patient as they noted she slowed her respirations significantly while her O2 saturation dropped while en route. He brought the nonhospital DNR form in when he arrived to the ED>  We converted this form to a MOLST form. We contacted [facility] and d/w [facility] to inform that pt is actively dying. Son agreed to Oxygen. No IV. No fluids. No XR or antibiotics. He requests morphine for pain as she is grimacing. We have administered.  Pt  at 22:23. D/W Eduardo WEBB. Prelim cause of death, colon cancer. 89F hx of advanced dementia, hypothyroid, LBBB, hx of colon ca s/p partial colectomy, hx of lung ca s/p cyberknife 2 years ago with questionable recurrent lung ca/colon ca (+CT lung nodule and ?colonic mass and elevated CEA) declined any further workup by family secondary to advanced age and dementia. Pt was d/c to home with home hospice earlier this year around July. Pt stopped eating 4 days ago. She had worsening SOB today and son notes calling [facility] where he was told they would see her in the morning. He says he felt ill prepared - he didn't have oxygen or pain medicine to assist in her comfort and she was in respiratory distress. He understands that she is dying. She has a nonhospital DNR form, which he couldn't find at the time EMS was there. EMS was bagging the patient as they noted she slowed her respirations significantly while her O2 saturation dropped while en route. He brought the nonhospital DNR form in when he arrived to the ED>  We converted this form to a MOLST form. We contacted [facility] and d/w [facility] to inform that pt is actively dying. Son agreed to Oxygen. No IV. No fluids. No XR or antibiotics. He requests morphine for pain as she is grimacing. We have administered.  Pt  at 22:23. D/W Eduardo WEBB. Prelim cause of death, colon cancer.   EDRS Death Cert completed: # [id number] 89F hx of advanced dementia, hypothyroid, LBBB, hx of colon ca s/p partial colectomy, hx of lung ca s/p cyberknife 2 years ago with questionable recurrent lung ca/colon ca (+CT lung nodule and ?colonic mass and elevated CEA) declined any further workup by family secondary to advanced age and dementia. Pt was d/c to home with home hospice earlier this year around July. Pt stopped eating 4 days ago. She had worsening SOB today and son notes calling [facility] where he was told they would see her in the morning. He says he felt ill prepared - he didn't have oxygen or pain medicine to assist in her comfort and she was in respiratory distress. He understands that she is dying. She has a nonhospital DNR form, which he couldn't find at the time EMS was there. EMS was bagging the patient as they noted she slowed her respirations significantly while her O2 saturation dropped while en route. He brought the nonhospital DNR form in when he arrived to the ED>  We converted this form to a MOLST form. We contacted [facility] and d/w [facility] to inform that pt is actively dying. Son agreed to Oxygen. No IV. No fluids. No XR or antibiotics. He requests morphine for pain as she is grimacing. We have administered.  Pt  at 22:23, pronounced by me. D/W Eduardo WEBB. Prelim cause of death, colon cancer.   EDRS Death Cert completed: # [id number] 89F hx of advanced dementia, hypothyroid, LBBB, hx of colon ca s/p partial colectomy, hx of lung ca s/p cyberknife 2 years ago with questionable recurrent lung ca/colon ca (+CT lung nodule and ?colonic mass and elevated CEA) declined any further workup by family secondary to advanced age and dementia. Pt was d/c to home with home hospice earlier this year around July. Pt stopped eating 4 days ago. She had worsening SOB today and son notes calling [facility] where he was told they would see her in the morning. He says he felt ill prepared - he didn't have oxygen or pain medicine to assist in her comfort and she was in respiratory distress. He understands that she is dying. She has a nonhospital DNR form, which he couldn't find at the time EMS was there. EMS was bagging the patient as they noted she slowed her respirations significantly while her O2 saturation dropped while en route. He brought the nonhospital DNR form in when he arrived to the ED>  We converted this form to a MOLST form. We contacted [facility] and d/w [facility] to inform that pt is actively dying. Son agreed to Oxygen. No IV. No fluids. No XR or antibiotics. He requests morphine for pain as she is grimacing. We have administered.  Pt  at 22:23, pronounced by me. D/W Eduardo WEBB. Prelim cause of death, colon cancer.   EDRS Death Cert completed: # [id number]

## 2020-11-14 NOTE — ED PROVIDER NOTE - OBJECTIVE STATEMENT
89F hx of advanced dementia, hypothyroid, LBBB, hx of colon ca s/p partial colectomy, hx of lung ca s/p cyberknife 2 years ago with questionable recurrent lung ca/colon ca (+CT lung nodule and ?colonic mass and elevated CEA) declined any further workup by family secondary to advanced age and dementia. Pt was d/c to home with home hospice earlier this year around July. Pt stopped eating 4 days ago. She had worsening SOB today and son notes calling [facility] where he was told they would see her in the morning. He says he felt ill prepared - he didn't have oxygen or pain medicine to assist in her comfort and she was in respiratory distress. He understands that she is dying. She has a nonhospital DNR form, which he couldn't find at the time EMS was there. EMS was bagging the patient as they noted she slowed her respirations significantly while her O2 saturation dropped while en route. He brought the nonhospital DNR form in when he arrived to the ED>

## 2020-11-14 NOTE — ED ADULT NURSE NOTE - OBJECTIVE STATEMENT
Patient BIB EMS for shortness of breath/difficulty breathing. Patient on home hospice, per EMS patient is a DNR, patient's son at bedside and endorses this.

## 2022-03-24 NOTE — ED ADULT NURSE NOTE - CADM POA CENTRAL LINE
Advance Directives: Care Instructions  Overview  An advance directive is a legal way to state your wishes at the end of your life. It tells your family and your doctor what to do if you can't say what you want. There are two main types of advance directives. You can change them any time your wishes change. Living will. This form tells your family and your doctor your wishes about life support and other treatment. The form is also called a declaration. Medical power of . This form lets you name a person to make treatment decisions for you when you can't speak for yourself. This person is called a health care agent (health care proxy, health care surrogate). The form is also called a durable power of  for health care. If you do not have an advance directive, decisions about your medical care may be made by a family member, or by a doctor or a  who doesn't know you. It may help to think of an advance directive as a gift to the people who care for you. If you have one, they won't have to make tough decisions by themselves. Follow-up care is a key part of your treatment and safety. Be sure to make and go to all appointments, and call your doctor if you are having problems. It's also a good idea to know your test results and keep a list of the medicines you take. What should you include in an advance directive? Many states have a unique advance directive form. (It may ask you to address specific issues.) Or you might use a universal form that's approved by many states. If your form doesn't tell you what to address, it may be hard to know what to include in your advance directive. Use the questions below to help you get started. · Who do you want to make decisions about your medical care if you are not able to? · What life-support measures do you want if you have a serious illness that gets worse over time or can't be cured? · What are you most afraid of that might happen?  (Maybe you're afraid of having pain, losing your independence, or being kept alive by machines.)  · Where would you prefer to die? (Your home? A hospital? A nursing home?)  · Do you want to donate your organs when you die? · Do you want certain Yarsanism practices performed before you die? When should you call for help? Be sure to contact your doctor if you have any questions. Where can you learn more? Go to [email or web address] org and sign in to your [employer] account. Enter R264 in the [employer] box to learn more about \"Advance Directives: Care Instructions. \"     If you do not have an account, please click on the \"Sign Up Now\" link. Current as of: March 17, 2021               Content Version: 13.1  © 9525-7971 Healthwise, Incorporated. Care instructions adapted under license by [facility] [facility] If you have questions about a medical condition or this instruction, always ask your healthcare professional. [place] [address] any warranty or liability for your use of this information. Stopping Smoking: Care Instructions  Your Care Instructions     Cigarette smokers crave the nicotine in cigarettes. Giving it up is much harder than simply changing a habit. Your body has to stop craving the nicotine. It is hard to quit, but you can do it. There are many tools that people use to quit smoking. You may find that combining tools works best for you. There are several steps to quitting. First you get ready to quit. Then you get support to help you. After that, you learn new skills and behaviors to become a nonsmoker. For many people, a necessary step is getting and using medicine. Your doctor will help you set up the plan that best meets your needs. You may want to attend a smoking cessation program to help you quit smoking. When you choose a program, look for one that has proven success. Ask your doctor for ideas.  You will greatly increase your chances of success if you take medicine as well as get counseling or join a cessation program.  Some of the changes you feel when you first quit tobacco are uncomfortable. Your body will miss the nicotine at first, and you may feel short-tempered and grumpy. You may have trouble sleeping or concentrating. Medicine can help you deal with these symptoms. You may struggle with changing your smoking habits and rituals. The last step is the tricky one: Be prepared for the smoking urge to continue for a time. This is a lot to deal with, but keep at it. You will feel better. Follow-up care is a key part of your treatment and safety. Be sure to make and go to all appointments, and call your doctor if you are having problems. It's also a good idea to know your test results and keep a list of the medicines you take. How can you care for yourself at home? · Ask your family, friends, and coworkers for support. You have a better chance of quitting if you have help and support. · Join a support group, such as Nicotine Anonymous, for people who are trying to quit smoking. · Consider signing up for a smoking cessation program, such as the American Lung Association's Freedom from Smoking program.  · Get text messaging support. Go to the website at www.smokefree. gov to sign up for the [facility] program.  · Set a quit date. Pick your date carefully so that it is not right in the middle of a big deadline or stressful time. Once you quit, do not even take a puff. Get rid of all ashtrays and lighters after your last cigarette. Clean your house and your clothes so that they do not smell of smoke. · Learn how to be a nonsmoker. Think about ways you can avoid those things that make you reach for a cigarette. ? Avoid situations that put you at greatest risk for smoking. For some people, it is hard to have a drink with friends without smoking. For others, they might skip a coffee break with coworkers who smoke. ? Change your daily routine.  Take a different route to work or eat a meal in a different place. · Cut down on stress. Calm yourself or release tension by doing an activity you enjoy, such as reading a book, taking a hot bath, or gardening. · Talk to your doctor or pharmacist about nicotine replacement therapy, which replaces the nicotine in your body. You still get nicotine but you do not use tobacco. Nicotine replacement products help you slowly reduce the amount of nicotine you need. These products come in several forms, many of them available over-the-counter:  ? Nicotine patches  ? Nicotine gum and lozenges  ? Nicotine inhaler  · Ask your doctor about bupropion (Wellbutrin) or varenicline (Chantix), which are prescription medicines. They do not contain nicotine. They help you by reducing withdrawal symptoms, such as stress and anxiety. · Some people find hypnosis, acupuncture, and massage helpful for ending the smoking habit. · Eat a healthy diet and get regular exercise. Having healthy habits will help your body move past its craving for nicotine. · Be prepared to keep trying. Most people are not successful the first few times they try to quit. Do not get mad at yourself if you smoke again. Make a list of things you learned and think about when you want to try again, such as next week, next month, or next year. Where can you learn more? Go to https://BIO-NEMS.[employer]Sk[employer]. org and sign in to your [employer] account. Enter Y215 in the [facility] box to learn more about \"Stopping Smoking: Care Instructions. \"     If you do not have an account, please click on the \"Sign Up Now\" link. Current as of: February 11, 2021               Content Version: 13.1  © 1259-3835 Healthwise, [employer]. Care instructions adapted under license by [facility] ([facility]).  If you have questions about a medical condition or this instruction, always ask your healthcare professional. [address] any warranty or liability for your use of this information. WELL ADULT LIFESTYLE INSTRUCTIONS:    Martina Morgan a day in the next week to spend an hour reviewing the information below then:     1) determine your health goals for the year   2) determine what changes you need to achieve those goals   3) design your daily routine, shopping habits etc to implement those changes        Default Right Action (no choices)       Make it EASY to do the RIGHT THINGS. 4) I invite you to send me your plans via [employer] so I can continue to help you       with them    Examine your lifestyle with an emphasis on BARRIERS to bad and good habits and how you can design your life to make better choices. If you want to feel better these are the FUNDAMENTAL PILLARS of Wellness:    1)  You can choose to Get 150 min/week of moderate exercise (can talk but can't        sing) or 75 min/week of vigorous exercise (can't talk). This will enhance your sense of well being (Exercise is as good as medicine for   depression.)    2)  You can choose to Get 7-9 hours of sleep per night    Detoxifies your brain, reduces risk of dementia    3)  You can choose to Strength Train 2 x a week on non-consecutive days   This will improve function and reduce risk of injury. Body weight type exercises   such as Yoga and Pilates are excellent choices. 4)  You can choose Good Nutrition. Only eat your goal weight (in lbs) x 10        calories/day and get 5 servings of Vegetables/day   Plant based diets reduce risk of heart attack/stroke and will help you feel full on   less food. Avoid highly processed foods and processed carbohydrates. 5)  You can choose Moderate alcohol intake < 1-2 drinks/day   Alcohol will disrupt your sleep and add calories to your day. 6)  You can choose to Develop a Charismatic/Supportive relationship. This will strengthen your resilience for the ups and downs. 7)  You can choose to Practice Mindfulness.      An hour a day of prayer/meditation/gratitude will change your life! If you are trying to lose weight, here are some recommendations for weight loss:  Not every weight loss program is appropriate for everybody. ..  good online sources include Noom (more social with daily check ins), Lifesum (similar but less social) and Naturally slim, as well as Brandneu ($1500)    The GI Diet or \"Primal diet\", Intermittent fasting can also be effective choices. If you have diabetes treated with insulin be sure to ask for specific guidance around meals. Take your desired weight in pounds and multiply by 10 and that is your average daily calorie allowance. For example if you wish to weigh 170 lb x 10 = 1700 lynn/day (this is how to gradually lose the weight and maintain your desired weight). Avoid soda/coke and all \"wet carbs\" => Drink ice water instead    Drink a large glass of ice water before meals and EAT SLOWLY (talk while you eat)! Rethink your hunger => it means your losing weight. Minimize highly processed carbohydrates as they stimulate your appetite:  Specifically cut back on Bread, Rice, Pasta and Potatoes    Avoid eating calories after 6 pm      Patient Education        Low Back Pain: Exercises  Introduction  Here are some examples of exercises for you to try. The exercises may be suggested for a condition or for rehabilitation. Start each exercise slowly. Ease off the exercises if you start to have pain. You will be told when to start these exercises and which ones will work best for you. How to do the exercises  Press-up    1. Lie on your stomach, supporting your body with your forearms. 2. Press your elbows down into the floor to raise your upper back. As you do this, relax your stomach muscles and allow your back to arch without using your back muscles. As your press up, do not let your hips or pelvis come off the floor. 3. Hold for 15 to 30 seconds, then relax. 4. Repeat 2 to 4 times.   Alternate arm and leg (bird dog) exercise    Do this exercise slowly. Try to keep your body straight at all times, and do not let one hip drop lower than the other. 1. Start on the floor, on your hands and knees. 2. Tighten your belly muscles. 3. Raise one leg off the floor, and hold it straight out behind you. Be careful not to let your hip drop down, because that will twist your trunk. 4. Hold for about 6 seconds, then lower your leg and switch to the other leg. 5. Repeat 8 to 12 times on each leg. 6. Over time, work up to holding for 10 to 30 seconds each time. 7. If you feel stable and secure with your leg raised, try raising the opposite arm straight out in front of you at the same time. Knee-to-chest exercise    1. Lie on your back with your knees bent and your feet flat on the floor. 2. Bring one knee to your chest, keeping the other foot flat on the floor (or keeping the other leg straight, whichever feels better on your lower back). 3. Keep your lower back pressed to the floor. Hold for at least 15 to 30 seconds. 4. Relax, and lower the knee to the starting position. 5. Repeat with the other leg. Repeat 2 to 4 times with each leg. 6. To get more stretch, put your other leg flat on the floor while pulling your knee to your chest.  Curl-ups    1. Lie on the floor on your back with your knees bent at a 90-degree angle. Your feet should be flat on the floor, about 12 inches from your buttocks. 2. Cross your arms over your chest. If this bothers your neck, try putting your hands behind your neck (not your head), with your elbows spread apart. 3. Slowly tighten your belly muscles and raise your shoulder blades off the floor. 4. Keep your head in line with your body, and do not press your chin to your chest.  5. Hold this position for 1 or 2 seconds, then slowly lower yourself back down to the floor. 6. Repeat 8 to 12 times. Pelvic tilt exercise    1. Lie on your back with your knees bent. 2. \"Brace\" your stomach.  This means to tighten your muscles by pulling in and imagining your belly button moving toward your spine. You should feel like your back is pressing to the floor and your hips and pelvis are rocking back. 3. Hold for about 6 seconds while you breathe smoothly. 4. Repeat 8 to 12 times. Heel dig bridging    1. Lie on your back with both knees bent and your ankles bent so that only your heels are digging into the floor. Your knees should be bent about 90 degrees. 2. Then push your heels into the floor, squeeze your buttocks, and lift your hips off the floor until your shoulders, hips, and knees are all in a straight line. 3. Hold for about 6 seconds as you continue to breathe normally, and then slowly lower your hips back down to the floor and rest for up to 10 seconds. 4. Do 8 to 12 repetitions. Hamstring stretch in doorway    1. Lie on your back in a doorway, with one leg through the open door. 2. Slide your leg up the wall to straighten your knee. You should feel a gentle stretch down the back of your leg. 3. Hold the stretch for at least 15 to 30 seconds. Do not arch your back, point your toes, or bend either knee. Keep one heel touching the floor and the other heel touching the wall. 4. Repeat with your other leg. 5. Do 2 to 4 times for each leg. Hip flexor stretch    1. Kneel on the floor with one knee bent and one leg behind you. Place your forward knee over your foot. Keep your other knee touching the floor. 2. Slowly push your hips forward until you feel a stretch in the upper thigh of your rear leg. 3. Hold the stretch for at least 15 to 30 seconds. Repeat with your other leg. 4. Do 2 to 4 times on each side. Wall sit    1. Stand with your back 10 to 12 inches away from a wall. 2. Lean into the wall until your back is flat against it. 3. Slowly slide down until your knees are slightly bent, pressing your lower back into the wall. 4. Hold for about 6 seconds, then slide back up the wall. 5. Repeat 8 to 12 times.   Follow-up care is a key part of your treatment and safety. Be sure to make and go to all appointments, and call your doctor if you are having problems. It's also a good idea to know your test results and keep a list of the medicines you take. Where can you learn more? Go to [email or web address].[email or web address]. org and sign in to your [employer] account. Enter O803 in the [employer] box to learn more about \"Low Back Pain: Exercises. \"     If you do not have an account, please click on the \"Sign Up Now\" link. Current as of: July 1, 2021               Content Version: 13.1  © 9547-3300 Healthwise, Incorporated. Care instructions adapted under license by [facility] ([facility]). If you have questions about a medical condition or this instruction, always ask your healthcare professional. [address] any warranty or liability for your use of this information. No

## 2022-04-01 NOTE — PROVIDER CONTACT NOTE (CRITICAL VALUE NOTIFICATION) - NS PROVIDER READ BACK TO LAB
yes Rotation Flap Text: The defect edges were debeveled with a #15 scalpel blade.  Given the location of the defect, shape of the defect and the proximity to free margins a rotation flap was deemed most appropriate.  Using a sterile surgical marker, an appropriate rotation flap was drawn incorporating the defect and placing the expected incisions within the relaxed skin tension lines where possible.    The area thus outlined was incised deep to adipose tissue with a #15 scalpel blade.  The skin margins were undermined to an appropriate distance in all directions utilizing iris scissors.

## 2022-10-10 NOTE — ED ADULT NURSE NOTE - BREATH SOUNDS, RIGHT
Occupational Therapy Visit     Referred by: Ga Lugo MD; Medical Diagnosis (from order):    Diagnosis Information      Diagnosis    719.42 (ICD-9-CM) - M25.521 (ICD-10-CM) - Right elbow pain              Visit: 17    Visit Type: Daily Treatment Note    SUBJECTIVE                                                                                                               Pt reports her right forearm continues to be aching and irritable.   Pain / Symptoms:  Pain rating (out of 10): Current: 5     OBJECTIVE                                                                                                                     Range of Motion (ROM)   (degrees unless noted; active unless noted; norms in ( ); negative=lacking to 0, positive=beyond 0)   Elbow/Forearm:     - Flexion (140-150):        • Right: 140    - Extension (0):        • Right: 0  Details / Comments: Able to flex/ext wrist with forearm prone and elbow fully extended nearly full motion with light pulling but no pain     Strength  (out of 5 unless noted, standard test position unless noted, lbs tested with hand held dynamometer)   : (lbs)    - Neutral:        • Right: Trial(s): 30, 32, 30, Average: 30.67       TREATMENT                                                                                                                  Therapeutic Exercise:  AAROM wrist flex/ext pain free range 1x10  AAROM elbow flex/ext pain free range 1x10  Gentle phase III tennis elbow stretch 30 sec x 3 - submax passive wrist flex, elbow fully extended        Manual Therapy:  STM dorsal/radial forearm, Length of brachioradialis    Dry Needling:  - Consent signed: yes  - Dry needling used to/for: pain relief and neuromuscular excitation  Education about indications, contraindications and potential side effects completed with patient.  Screen Completed    - Precautions: local skin lesions, lyme disease, local lymphedema, severe hyperalgesia/allodynia, metal  allergies: nickel and chromium, abnormal bleeding tendency, immunodeficiency and/or compromised immune system, second or third trimester of pregnancy, vascular disease, history of spontaneous pneumothorax   - Contraindications: local or systemic infections including the flu, over implants, active cancer, area of lymphatic compromise, area of lumpectomy/mastectomy, first trimester of pregnancy    Dry Needling completed and enhanced with electrical stimulation (E-Stim)   - Location: R proximal aspect central EDC muscle belly Needle size: [id number] Quantity: 1   - Location: R distal aspect central EDC muscle belly Needle size: 0.26f90ro Quantity: 1    Total Needles Inserted: 2 Removed: 2    Skilled input: verbal instruction/cues and tactile instruction/cues    Writer verbally educated and received verbal consent for hand placement, positioning of patient, and techniques to be performed today from patient for clothing adjustments for techniques and therapist position for techniques as described above and how they are pertinent to the patient's plan of care.    Home Exercise Program/Education Materials: Phase II tennis elbow stretch 30 sec x 3   AROM shoulder abduction with elbows at 90 flexion 3x15  AROM shoulder ER at 90 abduction 3x15  Blue foam composite grasp forearm neutral, elbow at 90 degrees 1x15, 3x/day   1-2# eccentric wrist extension 3x12   Blue tband split stance low rows 4x12     Moist Heat (54154)  Location: R elbow/proximal 1/3 forearm - supervised  Position: sitting  Temperature: 165° F  Duration: 15 minutes  Results: decreased pain  Reaction: no adverse reaction to treatment      ASSESSMENT                                                                                                             Pt tolerated therapy well this date. Reported reduce muscle tightness in right forearm extensors following therapy. Gave her size C tensogrip for compression which she reported felt good to wear in clinic  today. 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   Pain/symptoms after session (out of 10): 5  Patient Education:   Results of above outlined education: Verbalizes understanding and Demonstrates understanding      PLAN                                                                                                                           Suggestions for next session as indicated: Progress per plan of care, STM, Strengthening, stretching, joint mobilizations, modalities prn, k-tape prn, education and training in body mechanics         Therapy procedure time and total treatment time can be found documented on the Time Entry flowsheet   clear

## 2022-12-05 NOTE — ED ADULT NURSE NOTE - NSSEPSISSUSPECTED_ED_A_ED
No
Constitutional: +fever, no chills, unintended weight loss.  Eyes:  No visual changes, eye pain or discharge.  ENMT:  No hearing changes, pain, no sore throat or runny nose, no difficulty swallowing  Respiratory:  +cough or respiratory distress. No hemoptysis. No history of asthma or RAD.  GI:  see hpi  :  No dysuria, frequency or burning.  MS:  No myalgia, muscle weakness, joint pain or back pain.  Neuro:  No headache or weakness.  No LOC.  Skin:  No skin rash.   Endocrine: No history of thyroid disease or diabetes.

## 2023-02-10 NOTE — ED PROVIDER NOTE - NSDCPRINTRESULTS_ED_ALL_ED
MEDICATIONS  (STANDING):  chlorhexidine 2% Cloths 1 Application(s) Topical <User Schedule>  folic acid 1 milliGRAM(s) Oral daily  insulin lispro (ADMELOG) corrective regimen sliding scale   SubCutaneous every 6 hours  multivitamin 1 Tablet(s) Oral daily  pantoprazole  Injectable 40 milliGRAM(s) IV Push daily  thiamine 100 milliGRAM(s) Oral daily    MEDICATIONS  (PRN):  LORazepam     Tablet 2 milliGRAM(s) Oral every 2 hours PRN CIWA-Ar score increase by 2 points and a total score of 7 or less  LORazepam   Injectable 2 milliGRAM(s) IV Push every 1 hour PRN CIWA-Ar score 8 or greater   Patient requests all Lab and Radiology Results on their Discharge Instructions

## 2023-03-23 NOTE — PHYSICAL THERAPY INITIAL EVALUATION ADULT - THERAPY FREQUENCY, PT EVAL
Patient did not ever receive the Breo Ellipta inhaler. Please call pharmacy to discuss the reason patient did not get the inhaler.Please call Pia to discuss   2-3x/week

## 2023-05-25 NOTE — ED PROVIDER NOTE - NS_EDPROVIDERDISPOUSERTYPE_ED_A_ED
----- Message from Missy Alford sent at 5/25/2023 12:03 PM CDT -----  Type: Needs Medical Advice  Who Called:  pt  Symptoms (please be specific):  rash in area  Best Call Back Number: [phone]  Additional Information: pt stated she was advised not to come unless the pt had problems/ concerns due to age and was told she would still be an EP. I made the appt for 06/01at 8:40 am and wanted the clinic to know in case I made a mistake. Please advise, thanks!          Attending Attestation (For Attendings USE Only)...

## 2023-06-17 NOTE — ED ADULT NURSE NOTE - NEURO BEHAVIOR
From: Latasha Garcia  To: Chadwick Rodas MD  Sent: 6/16/2023 8:02 PM CDT  Subject: Medication refil    To Dr. Gavin Plummer   Please don't accept any refill requested by [facility] , I moved to [facility] , thank you very much calm

## 2023-09-26 NOTE — H&P ADULT - NSHPLABSRESULTS_GEN_ALL_CORE
13.0   10.87 )-----------( 252      ( 2020 15:45 )             39.5           137  |  102  |  20  ----------------------------<  87  3.3<L>   |  25  |  0.96    Ca    9.3      2020 15:45  Mg     2.0         TPro  6.8  /  Alb  3.0<L>  /  TBili  0.7  /  DBili  x   /  AST  34  /  ALT  22  /  AlkPhos  57      Lactate, Blood: 0.8 mmol/L ( @ 15:45)     Magnesium, Serum: 2.0 mg/dL [1.6 - 2.6] (20 @ 15:45)    LIVER FUNCTIONS - ( 2020 15:45 )  Alb: 3.0 g/dL / Pro: 6.8 g/dL / ALK PHOS: 57 U/L / ALT: 22 U/L / AST: 34 U/L / GGT: x               PT/INR - ( 2020 15:45 )   PT: 13.2 sec;   INR: 1.10 ratio         PTT - ( 2020 15:45 )  PTT:29.6 sec          Urinalysis Basic - ( 2020 16:43 )    Color: Yellow / Appearance: Clear / S.005 / pH: x  Gluc: x / Ketone: Small  / Bili: Negative / Urobili: Negative   Blood: x / Protein: Negative / Nitrite: Negative   Leuk Esterase: Small / RBC: 0-4 /HPF / WBC 3-5 /HPF   Sq Epi: x / Non Sq Epi: Neg.-Few / Bacteria: Few /HPF        CAPILLARY BLOOD GLUCOSE            EKG: LBBB at 83bpm       < from: Xray Chest 1 View AP/PA (20 @ 16:17) >    The heart is not enlarged. The trachea is midline. There is no focal infiltrate or pleural effusion. Stable bandlike density is again noted in the left upper lobe. There is osteopenia of the bony structures.    Impression: No active pulmonary disease.    < end of copied text >    < from: CT Abdomen and Pelvis w/ IV Cont (20 @ 18:50) >      IMPRESSION:   Stercoral colitis involving the rectosigmoid.    Prior right lower quadrant small bowel and rectosigmoid surgery.    Hiatal hernia and additional findings as above.      < end of copied text >
no

## 2023-11-03 NOTE — ED ADULT TRIAGE NOTE - CADM TRG TX PRIOR TO ARRIVAL
Daily Note    Today's date: 11/3/2023  Patient name: Korey Vang  : 1964  MRN: [id number]  Referring provider: Shelli Jay  Dx:   Encounter Diagnosis     ICD-10-CM    1. Aftercare following surgery of the musculoskeletal system  Z47.89       2. Neck pain  M54.2       3. Right shoulder pain, unspecified chronicity  M25.511                 Start Time: 0830  Stop Time: 09  Total time in clinic (min): 50 minutes    Subjective: Patient notes her (B) shoulders are painful and tight today. Reports low back pain as well. Objective: See treatment diary below        Assessment: Tolerated treatment well. Patient would benefit from continued PT. Significantly increased muscle tension noted in her R cervical and anterior shoulder musculature with some relief noted following STM. R shoulder PROM limited due to pain and anterior shoulder muscle tightness. Plan: Progress treatment as tolerated. Precautions: s/p C4-C5, C5-C6 ACDF on 23 with history of cervical myelopathy with myelomalacia. Precautions no lifting more than gallon of milk.  Prefers to lie SEMIRECUMBENT    Manuals 10/12 10/16 10/18 10/20 10/30 11/3   Cervical mx work [address] and upper cervical gentle mobs R [address] [place]   R shoulder PRom     [address] [place]   R shoulder mx work      [address] [place]   scap mobs [address] w/ post dep PNF  [address] [place]     Semi recumbent cerv dist/retraction         2nd rib mobs Seated mk [address] [place]      Thoracic mobs [address], [address] [place] S/l Mk S/l [address], [address] [place] S/l mk     Neuro Re-Ed         Chin tucks 2x10 3" holds w/ retraction w/ symptoms on and off 2x15 3" holds w/ retraction 2x10 3" holds w/ retraction  2x10 3" holds w/ retraction  2x10 3" holds w/ retraction  2x10 3" holds w/ retraction    scap 4  2x8  blue rows  2x10 blue rows 2x10 blue rows  2x10 blue rows   S/l mid/low trap PNF                                             Ther Ex         UBE or treadmill 5 min  5 min 5 min TM 5 min TM 5 min TM 5 min TM   Pulley     10"x10    scap retraction         Wall slides 15x  15x 15x  15x table 15x table 15x table   Self first rib mob         Shoulder isometrics 5x 5" holds Er/IR 5x 5" holds ER/IR 6x 5" holds Er/ir 6x 5" holds Er/ir     No moneys         Standing shoulder ER/IR         Seated thoracic extension 15x 3' holds 15x 3" holds  15x 5" holds 15x 5" holds     Doorway pec stretch 3x20" holds  3x20" holds  3x20" holds 3x20" holds 3x20" holds 3x20" holds   UT ed 15x 8 lbs  2x10 8 lbs 2x10 9 lbs 2x10 9 lbs 2x10 9 lbs 2x10 9 lbs   Repeated R sidebend reviewed        Supine L cerv SB w/ scap dep/retr   5x 10" holds      sidelying ER 10x, 8x 2x10 2x10 2x10  2x10   Ther Activity                           Gait Training                           Modalities none

## 2023-11-06 NOTE — ED PROVIDER NOTE - HIV OFFER
Today you were seen for an ear infection and upper respiratory infection.  Start antibiotics as directed and take until gone.  You may take children's ibuprofen or Tylenol per package instructions for discomfort. A prescription for Bromfed was sent to your pharmacy as well- do not take with any other OTC cold or cough medication.  Follow-up with pediatrician in 1 week with any persisting symptoms, or sooner with any additional concerns.  If developing any chest pain, trouble breathing, bluish color around the lips, lethargy or poor oral intake, proceed to emergency department for further evaluation.    Opt out

## 2024-02-29 NOTE — ED ADULT NURSE NOTE - IN THE PAST 12 MONTHS HAVE YOU USED DRUGS OTHER THAN THOSE REQUIRED FOR MEDICAL REASON?
Vascular Access Team    Ultrasound-Guided PIV Insertion    A 20g X 1.75 inch peripheral IV was inserted into the left forearm in one needle pass using ultrasound guidance. Insertion performed using aseptic non-touch technique. Brisk blood return noted and IV flushed easily with normal saline. Dressing dated and intact. The bedside RN, Angelica Bland was notified.
No

## 2024-03-26 NOTE — ED PROVIDER NOTE - DISCHARGE REVIEW MATERIAL PRESENTED
patient states " I am having pain in my left hip since last Wednesday and I was told I have pinched nerve and I am taking prednisone and Flexeril since yesterday with no relief "
.

## 2024-06-28 NOTE — ED ADULT NURSE NOTE - CAS DISCH BELONGINGS RETURNED
"Nutrition Services    Patient Name:  Bibiana Inman  YOB: 1978  MRN: [id number]  Admit Date:  6/21/2024Assessment Date:  06/28/24    46 y.o. female with history of recently diagnosed left sided lacunar CVA, T2DM, HTN, CAD, HLD, anemia, CKD3, GERD. Admitted with worsening right sided weakness. Noted weight loss per EMR 13# in 4 months (-8.5%, significant). Pt is eating well % most meals. Performed NFPE and no signs of significant fat loss or muscle wasting. Discussed consistent carb diet for blood sugar control.             Education topic: Carbohydrate counting, Diabetes     Resources given:  Discussion only     Advised regarding: Appropriate portions, Eating pattern, Food choices     Learner:  Patient     Readiness to learn: Accepting     RD contact info provided: Yes     Outpatient referral:         NUTRITION SCREENING      Reason for Encounter LOS   Diagnosis/Problem Acute right sided weakness       PO Diet Diet: Diabetic; Consistent Carbohydrate; Fluid Consistency: Thin (IDDSI 0)  NPO Diet NPO Type: Sips with Meds   Supplements    PO Intake % %       Medications MAR reviewed by RD   Labs  Listed below, reviewed   Physical Findings Facial droop, teeth missing, alert, oriented   GI Function LBM 6/26, nausea   Skin Status intact       Height  Weight  BMI  Weight Trend     Height: 162.6 cm (64\")  Weight: 64.9 kg (143 lb 1.3 oz) (06/26/24 0532)  Body mass index is 24.56 kg/m².  Loss       Nutrition Problem (PES) Altered nutrition related labs related to DM2 as evidence by HgA1c 13.10 on 6/26       Intervention/Plan Encourage oral intakes and consistent carb diet for blood sugar control     RD to follow up per protocol.     Results from last 7 days   Lab Units 06/28/24  1407 06/28/24  0310 06/27/24  0321 06/26/24  0526 06/21/24  2114 06/21/24  2106   SODIUM mmol/L  --  137 137 137   < > 137   POTASSIUM mmol/L 5.0 3.5 3.9 4.3   < > 3.8   CHLORIDE mmol/L  --  103 106 108*   < > 104   CO2 mmol/L "  --  24.0 24.3 23.2   < > 22.0   BUN mg/dL  --  16 16 13   < > 11   CREATININE mg/dL  --  1.00 1.11* 1.02*   < > 1.40*   CALCIUM mg/dL  --  9.2 8.8 9.1   < > 8.5*   BILIRUBIN mg/dL  --   --   --  <0.2  --  <0.2   ALK PHOS U/L  --   --   --  88  --  90   ALT (SGPT) U/L  --   --   --  16  --  9   AST (SGOT) U/L  --   --   --  17  --  12   GLUCOSE mg/dL  --  139* 155* 169*   < > 197*    < > = values in this interval not displayed.     Results from last 7 days   Lab Units 06/28/24  0310 06/27/24  0321 06/26/24  0526 06/23/24  0500 06/22/24  0323   MAGNESIUM mg/dL  --   --  1.8  --   --    HEMOGLOBIN g/dL 11.2*   < > 11.2*   < > 11.5*   HEMATOCRIT % 34.2   < > 34.5   < > 35.7   TRIGLYCERIDES mg/dL  --   --   --   --  610*    < > = values in this interval not displayed.     Lab Results   Component Value Date    HGBA1C 13.10 (H) 06/26/2024         Electronically signed by:  Lola Miller RD  06/28/24 14:57 EDT   n/a/Not applicable
